# Patient Record
Sex: MALE | Race: BLACK OR AFRICAN AMERICAN | NOT HISPANIC OR LATINO | Employment: OTHER | URBAN - METROPOLITAN AREA
[De-identification: names, ages, dates, MRNs, and addresses within clinical notes are randomized per-mention and may not be internally consistent; named-entity substitution may affect disease eponyms.]

---

## 2017-05-15 ENCOUNTER — NURSE TRIAGE (OUTPATIENT)
Dept: ADMINISTRATIVE | Facility: CLINIC | Age: 77
End: 2017-05-15

## 2023-12-03 ENCOUNTER — HOSPITAL ENCOUNTER (INPATIENT)
Facility: HOSPITAL | Age: 83
LOS: 11 days | Discharge: NURSING, REHAB OR LONG-TERM CARE | DRG: 064 | End: 2023-12-15
Attending: EMERGENCY MEDICINE | Admitting: PSYCHIATRY & NEUROLOGY
Payer: MEDICARE

## 2023-12-03 DIAGNOSIS — I61.4 LEFT-SIDED NONTRAUMATIC INTRACEREBRAL HEMORRHAGE OF CEREBELLUM: ICD-10-CM

## 2023-12-03 DIAGNOSIS — M79.603 ARM PAIN: ICD-10-CM

## 2023-12-03 DIAGNOSIS — R41.0 DELIRIUM: Primary | ICD-10-CM

## 2023-12-03 DIAGNOSIS — I61.4 CEREBELLAR HEMORRHAGE, ACUTE: ICD-10-CM

## 2023-12-03 DIAGNOSIS — M25.539 WRIST PAIN: ICD-10-CM

## 2023-12-03 DIAGNOSIS — I61.9 ICH (INTRACEREBRAL HEMORRHAGE): ICD-10-CM

## 2023-12-03 DIAGNOSIS — F03.911 AGITATION DUE TO DEMENTIA: ICD-10-CM

## 2023-12-03 LAB
ALBUMIN SERPL BCP-MCNC: 3.4 G/DL (ref 3.5–5.2)
ALP SERPL-CCNC: 53 U/L (ref 55–135)
ALT SERPL W/O P-5'-P-CCNC: 13 U/L (ref 10–44)
AMMONIA PLAS-SCNC: 28 UMOL/L (ref 10–50)
ANION GAP SERPL CALC-SCNC: 14 MMOL/L (ref 8–16)
AST SERPL-CCNC: 39 U/L (ref 10–40)
BASOPHILS # BLD AUTO: 0.02 K/UL (ref 0–0.2)
BASOPHILS NFR BLD: 0.3 % (ref 0–1.9)
BILIRUB SERPL-MCNC: 0.9 MG/DL (ref 0.1–1)
BUN SERPL-MCNC: 16 MG/DL (ref 8–23)
CALCIUM SERPL-MCNC: 9.4 MG/DL (ref 8.7–10.5)
CHLORIDE SERPL-SCNC: 100 MMOL/L (ref 95–110)
CO2 SERPL-SCNC: 30 MMOL/L (ref 23–29)
CREAT SERPL-MCNC: 0.9 MG/DL (ref 0.5–1.4)
DIFFERENTIAL METHOD: ABNORMAL
EOSINOPHIL # BLD AUTO: 0 K/UL (ref 0–0.5)
EOSINOPHIL NFR BLD: 0.1 % (ref 0–8)
ERYTHROCYTE [DISTWIDTH] IN BLOOD BY AUTOMATED COUNT: 15.5 % (ref 11.5–14.5)
EST. GFR  (NO RACE VARIABLE): >60 ML/MIN/1.73 M^2
GLUCOSE SERPL-MCNC: 89 MG/DL (ref 70–110)
HCT VFR BLD AUTO: 35.8 % (ref 40–54)
HGB BLD-MCNC: 12 G/DL (ref 14–18)
IMM GRANULOCYTES # BLD AUTO: 0.02 K/UL (ref 0–0.04)
IMM GRANULOCYTES NFR BLD AUTO: 0.3 % (ref 0–0.5)
INFLUENZA A, MOLECULAR: NOT DETECTED
INFLUENZA B, MOLECULAR: NOT DETECTED
LACTATE SERPL-SCNC: 2 MMOL/L (ref 0.5–2.2)
LYMPHOCYTES # BLD AUTO: 1.2 K/UL (ref 1–4.8)
LYMPHOCYTES NFR BLD: 15.8 % (ref 18–48)
MCH RBC QN AUTO: 30.6 PG (ref 27–31)
MCHC RBC AUTO-ENTMCNC: 33.5 G/DL (ref 32–36)
MCV RBC AUTO: 91 FL (ref 82–98)
MONOCYTES # BLD AUTO: 0.4 K/UL (ref 0.3–1)
MONOCYTES NFR BLD: 5.9 % (ref 4–15)
NEUTROPHILS # BLD AUTO: 5.7 K/UL (ref 1.8–7.7)
NEUTROPHILS NFR BLD: 77.6 % (ref 38–73)
NRBC BLD-RTO: 0 /100 WBC
PLATELET # BLD AUTO: 199 K/UL (ref 150–450)
PMV BLD AUTO: 9.2 FL (ref 9.2–12.9)
POTASSIUM SERPL-SCNC: 3.1 MMOL/L (ref 3.5–5.1)
PROT SERPL-MCNC: 8.3 G/DL (ref 6–8.4)
RBC # BLD AUTO: 3.92 M/UL (ref 4.6–6.2)
RSV AG BY MOLECULAR METHOD: NOT DETECTED
SARS-COV-2 RNA RESP QL NAA+PROBE: NOT DETECTED
SODIUM SERPL-SCNC: 144 MMOL/L (ref 136–145)
WBC # BLD AUTO: 7.34 K/UL (ref 3.9–12.7)

## 2023-12-03 PROCEDURE — 96372 THER/PROPH/DIAG INJ SC/IM: CPT | Performed by: EMERGENCY MEDICINE

## 2023-12-03 PROCEDURE — 93010 ELECTROCARDIOGRAM REPORT: CPT | Mod: ,,, | Performed by: INTERNAL MEDICINE

## 2023-12-03 PROCEDURE — 63600175 PHARM REV CODE 636 W HCPCS: Performed by: EMERGENCY MEDICINE

## 2023-12-03 PROCEDURE — 87040 BLOOD CULTURE FOR BACTERIA: CPT | Performed by: EMERGENCY MEDICINE

## 2023-12-03 PROCEDURE — 83605 ASSAY OF LACTIC ACID: CPT | Performed by: EMERGENCY MEDICINE

## 2023-12-03 PROCEDURE — 87186 SC STD MICRODIL/AGAR DIL: CPT | Performed by: EMERGENCY MEDICINE

## 2023-12-03 PROCEDURE — 93005 ELECTROCARDIOGRAM TRACING: CPT

## 2023-12-03 PROCEDURE — 87077 CULTURE AEROBIC IDENTIFY: CPT | Performed by: EMERGENCY MEDICINE

## 2023-12-03 PROCEDURE — 87150 DNA/RNA AMPLIFIED PROBE: CPT | Performed by: EMERGENCY MEDICINE

## 2023-12-03 PROCEDURE — 99285 EMERGENCY DEPT VISIT HI MDM: CPT | Mod: 25

## 2023-12-03 PROCEDURE — 85025 COMPLETE CBC W/AUTO DIFF WBC: CPT | Performed by: EMERGENCY MEDICINE

## 2023-12-03 PROCEDURE — 82140 ASSAY OF AMMONIA: CPT | Performed by: EMERGENCY MEDICINE

## 2023-12-03 PROCEDURE — 80053 COMPREHEN METABOLIC PANEL: CPT | Performed by: EMERGENCY MEDICINE

## 2023-12-03 PROCEDURE — 0241U SARS-COV2 (COVID) WITH FLU/RSV BY PCR: CPT | Performed by: EMERGENCY MEDICINE

## 2023-12-03 PROCEDURE — 82962 GLUCOSE BLOOD TEST: CPT

## 2023-12-03 PROCEDURE — 93010 EKG 12-LEAD: ICD-10-PCS | Mod: ,,, | Performed by: INTERNAL MEDICINE

## 2023-12-03 RX ORDER — HALOPERIDOL 5 MG/ML
2.5 INJECTION INTRAMUSCULAR
Status: COMPLETED | OUTPATIENT
Start: 2023-12-03 | End: 2023-12-03

## 2023-12-03 RX ADMIN — HALOPERIDOL LACTATE 2.5 MG: 5 INJECTION, SOLUTION INTRAMUSCULAR at 09:12

## 2023-12-04 PROBLEM — B18.2 CHRONIC HEPATITIS C WITHOUT HEPATIC COMA: Status: ACTIVE | Noted: 2023-08-01

## 2023-12-04 PROBLEM — I10 ESSENTIAL (PRIMARY) HYPERTENSION: Status: ACTIVE | Noted: 2023-07-06

## 2023-12-04 PROBLEM — G93.5 BRAIN COMPRESSION: Status: ACTIVE | Noted: 2023-12-04

## 2023-12-04 PROBLEM — I61.4 LEFT-SIDED NONTRAUMATIC INTRACEREBRAL HEMORRHAGE OF CEREBELLUM: Status: ACTIVE | Noted: 2023-12-04

## 2023-12-04 PROBLEM — Z87.891 PERSONAL HISTORY OF NICOTINE DEPENDENCE: Status: ACTIVE | Noted: 2023-08-01

## 2023-12-04 PROBLEM — I61.5 IVH (INTRAVENTRICULAR HEMORRHAGE): Status: ACTIVE | Noted: 2023-12-04

## 2023-12-04 PROBLEM — F03.90 UNSPECIFIED DEMENTIA, UNSPECIFIED SEVERITY, WITHOUT BEHAVIORAL DISTURBANCE, PSYCHOTIC DISTURBANCE, MOOD DISTURBANCE, AND ANXIETY: Status: ACTIVE | Noted: 2023-07-06

## 2023-12-04 LAB
ABO + RH BLD: NORMAL
ABO + RH BLD: NORMAL
ALBUMIN SERPL BCP-MCNC: 3.2 G/DL (ref 3.5–5.2)
ALLENS TEST: ABNORMAL
ALP SERPL-CCNC: 49 U/L (ref 55–135)
ALT SERPL W/O P-5'-P-CCNC: 10 U/L (ref 10–44)
ANION GAP SERPL CALC-SCNC: 12 MMOL/L (ref 8–16)
APTT PPP: 25 SEC (ref 21–32)
APTT PPP: 25.1 SEC (ref 21–32)
AST SERPL-CCNC: 36 U/L (ref 10–40)
BACTERIA #/AREA URNS AUTO: NORMAL /HPF
BASOPHILS # BLD AUTO: 0.02 K/UL (ref 0–0.2)
BASOPHILS NFR BLD: 0.4 % (ref 0–1.9)
BILIRUB SERPL-MCNC: 0.8 MG/DL (ref 0.1–1)
BILIRUB UR QL STRIP: NEGATIVE
BLD GP AB SCN CELLS X3 SERPL QL: NORMAL
BLD GP AB SCN CELLS X3 SERPL QL: NORMAL
BUN SERPL-MCNC: 17 MG/DL (ref 8–23)
CALCIUM SERPL-MCNC: 9 MG/DL (ref 8.7–10.5)
CHLORIDE SERPL-SCNC: 101 MMOL/L (ref 95–110)
CHOLEST SERPL-MCNC: 175 MG/DL (ref 120–199)
CHOLEST/HDLC SERPL: 3 {RATIO} (ref 2–5)
CLARITY UR REFRACT.AUTO: CLEAR
CO2 SERPL-SCNC: 32 MMOL/L (ref 23–29)
COLOR UR AUTO: YELLOW
CREAT SERPL-MCNC: 0.8 MG/DL (ref 0.5–1.4)
DELSYS: ABNORMAL
DIFFERENTIAL METHOD: ABNORMAL
EOSINOPHIL # BLD AUTO: 0 K/UL (ref 0–0.5)
EOSINOPHIL NFR BLD: 0.4 % (ref 0–8)
ERYTHROCYTE [DISTWIDTH] IN BLOOD BY AUTOMATED COUNT: 15.9 % (ref 11.5–14.5)
EST. GFR  (NO RACE VARIABLE): >60 ML/MIN/1.73 M^2
ESTIMATED AVG GLUCOSE: 91 MG/DL (ref 68–131)
GLUCOSE SERPL-MCNC: 66 MG/DL (ref 70–110)
GLUCOSE SERPL-MCNC: 67 MG/DL (ref 70–110)
GLUCOSE UR QL STRIP: NEGATIVE
HBA1C MFR BLD: 4.8 % (ref 4–5.6)
HCO3 UR-SCNC: 34.2 MMOL/L (ref 24–28)
HCT VFR BLD AUTO: 31.4 % (ref 40–54)
HDLC SERPL-MCNC: 58 MG/DL (ref 40–75)
HDLC SERPL: 33.1 % (ref 20–50)
HGB BLD-MCNC: 10.9 G/DL (ref 14–18)
HGB UR QL STRIP: NEGATIVE
HYALINE CASTS UR QL AUTO: 0 /LPF
IMM GRANULOCYTES # BLD AUTO: 0.02 K/UL (ref 0–0.04)
IMM GRANULOCYTES NFR BLD AUTO: 0.4 % (ref 0–0.5)
INR PPP: 1.1 (ref 0.8–1.2)
INR PPP: 1.1 (ref 0.8–1.2)
KETONES UR QL STRIP: ABNORMAL
LDLC SERPL CALC-MCNC: 105.8 MG/DL (ref 63–159)
LEUKOCYTE ESTERASE UR QL STRIP: NEGATIVE
LYMPHOCYTES # BLD AUTO: 0.9 K/UL (ref 1–4.8)
LYMPHOCYTES NFR BLD: 16.5 % (ref 18–48)
MCH RBC QN AUTO: 30.4 PG (ref 27–31)
MCHC RBC AUTO-ENTMCNC: 34.7 G/DL (ref 32–36)
MCV RBC AUTO: 88 FL (ref 82–98)
MICROSCOPIC COMMENT: NORMAL
MODE: ABNORMAL
MONOCYTES # BLD AUTO: 0.3 K/UL (ref 0.3–1)
MONOCYTES NFR BLD: 5.1 % (ref 4–15)
NEUTROPHILS # BLD AUTO: 4.4 K/UL (ref 1.8–7.7)
NEUTROPHILS NFR BLD: 77.2 % (ref 38–73)
NITRITE UR QL STRIP: NEGATIVE
NONHDLC SERPL-MCNC: 117 MG/DL
NRBC BLD-RTO: 0 /100 WBC
PCO2 BLDA: 50 MMHG (ref 35–45)
PH SMN: 7.44 [PH] (ref 7.35–7.45)
PH UR STRIP: 7 [PH] (ref 5–8)
PLATELET # BLD AUTO: 149 K/UL (ref 150–450)
PMV BLD AUTO: 8.9 FL (ref 9.2–12.9)
PO2 BLDA: 72 MMHG (ref 80–100)
POC BE: 10 MMOL/L
POC SATURATED O2: 95 % (ref 95–100)
POC TCO2: 36 MMOL/L (ref 23–27)
POCT GLUCOSE: 113 MG/DL (ref 70–110)
POCT GLUCOSE: 66 MG/DL (ref 70–110)
POCT GLUCOSE: 89 MG/DL (ref 70–110)
POTASSIUM SERPL-SCNC: 2.4 MMOL/L (ref 3.5–5.1)
POTASSIUM SERPL-SCNC: 3.1 MMOL/L (ref 3.5–5.1)
PROT SERPL-MCNC: 7.4 G/DL (ref 6–8.4)
PROT UR QL STRIP: ABNORMAL
PROTHROMBIN TIME: 11.9 SEC (ref 9–12.5)
PROTHROMBIN TIME: 11.9 SEC (ref 9–12.5)
RBC # BLD AUTO: 3.58 M/UL (ref 4.6–6.2)
RBC #/AREA URNS AUTO: 1 /HPF (ref 0–4)
SAMPLE: ABNORMAL
SITE: ABNORMAL
SODIUM SERPL-SCNC: 145 MMOL/L (ref 136–145)
SP GR UR STRIP: 1.02 (ref 1–1.03)
SPECIMEN OUTDATE: NORMAL
SPECIMEN OUTDATE: NORMAL
SQUAMOUS #/AREA URNS AUTO: 0 /HPF
T4 FREE SERPL-MCNC: 0.95 NG/DL (ref 0.71–1.51)
TRIGL SERPL-MCNC: 56 MG/DL (ref 30–150)
TSH SERPL DL<=0.005 MIU/L-ACNC: 5.46 UIU/ML (ref 0.4–4)
URN SPEC COLLECT METH UR: ABNORMAL
WBC # BLD AUTO: 5.69 K/UL (ref 3.9–12.7)
WBC #/AREA URNS AUTO: 0 /HPF (ref 0–5)

## 2023-12-04 PROCEDURE — 97530 THERAPEUTIC ACTIVITIES: CPT

## 2023-12-04 PROCEDURE — 80061 LIPID PANEL: CPT | Performed by: NURSE PRACTITIONER

## 2023-12-04 PROCEDURE — 85730 THROMBOPLASTIN TIME PARTIAL: CPT | Mod: 91 | Performed by: NURSE PRACTITIONER

## 2023-12-04 PROCEDURE — 99499 NO LOS: ICD-10-PCS | Mod: ,,, | Performed by: NURSE PRACTITIONER

## 2023-12-04 PROCEDURE — 36600 WITHDRAWAL OF ARTERIAL BLOOD: CPT

## 2023-12-04 PROCEDURE — 99291 CRITICAL CARE FIRST HOUR: CPT | Mod: ,,, | Performed by: NURSE PRACTITIONER

## 2023-12-04 PROCEDURE — 99499 UNLISTED E&M SERVICE: CPT | Mod: GC,,, | Performed by: PSYCHIATRY & NEUROLOGY

## 2023-12-04 PROCEDURE — 99233 SBSQ HOSP IP/OBS HIGH 50: CPT | Mod: FS,,, | Performed by: PSYCHIATRY & NEUROLOGY

## 2023-12-04 PROCEDURE — 85730 THROMBOPLASTIN TIME PARTIAL: CPT | Performed by: EMERGENCY MEDICINE

## 2023-12-04 PROCEDURE — 25000003 PHARM REV CODE 250: Performed by: EMERGENCY MEDICINE

## 2023-12-04 PROCEDURE — 80053 COMPREHEN METABOLIC PANEL: CPT | Performed by: NURSE PRACTITIONER

## 2023-12-04 PROCEDURE — 63600175 PHARM REV CODE 636 W HCPCS: Performed by: NURSE PRACTITIONER

## 2023-12-04 PROCEDURE — 86850 RBC ANTIBODY SCREEN: CPT | Performed by: EMERGENCY MEDICINE

## 2023-12-04 PROCEDURE — 97112 NEUROMUSCULAR REEDUCATION: CPT

## 2023-12-04 PROCEDURE — 84439 ASSAY OF FREE THYROXINE: CPT | Performed by: NURSE PRACTITIONER

## 2023-12-04 PROCEDURE — 63600175 PHARM REV CODE 636 W HCPCS: Performed by: EMERGENCY MEDICINE

## 2023-12-04 PROCEDURE — 84443 ASSAY THYROID STIM HORMONE: CPT | Performed by: NURSE PRACTITIONER

## 2023-12-04 PROCEDURE — 99900035 HC TECH TIME PER 15 MIN (STAT)

## 2023-12-04 PROCEDURE — 99233 PR SUBSEQUENT HOSPITAL CARE,LEVL III: ICD-10-PCS | Mod: ,,, | Performed by: PSYCHIATRY & NEUROLOGY

## 2023-12-04 PROCEDURE — 85610 PROTHROMBIN TIME: CPT | Mod: 91 | Performed by: NURSE PRACTITIONER

## 2023-12-04 PROCEDURE — 63600175 PHARM REV CODE 636 W HCPCS: Performed by: PHYSICIAN ASSISTANT

## 2023-12-04 PROCEDURE — 20000000 HC ICU ROOM

## 2023-12-04 PROCEDURE — 86900 BLOOD TYPING SEROLOGIC ABO: CPT | Mod: 91 | Performed by: NURSE PRACTITIONER

## 2023-12-04 PROCEDURE — 99499 NO LOS: ICD-10-PCS | Mod: GC,,, | Performed by: PSYCHIATRY & NEUROLOGY

## 2023-12-04 PROCEDURE — 99233 SBSQ HOSP IP/OBS HIGH 50: CPT | Mod: ,,, | Performed by: PSYCHIATRY & NEUROLOGY

## 2023-12-04 PROCEDURE — 99291 PR CRITICAL CARE, E/M 30-74 MINUTES: ICD-10-PCS | Mod: ,,, | Performed by: NURSE PRACTITIONER

## 2023-12-04 PROCEDURE — 94761 N-INVAS EAR/PLS OXIMETRY MLT: CPT | Mod: XB

## 2023-12-04 PROCEDURE — 25000003 PHARM REV CODE 250: Performed by: NURSE PRACTITIONER

## 2023-12-04 PROCEDURE — 97162 PT EVAL MOD COMPLEX 30 MIN: CPT

## 2023-12-04 PROCEDURE — 99233 PR SUBSEQUENT HOSPITAL CARE,LEVL III: ICD-10-PCS | Mod: FS,,, | Performed by: PSYCHIATRY & NEUROLOGY

## 2023-12-04 PROCEDURE — 85610 PROTHROMBIN TIME: CPT | Performed by: EMERGENCY MEDICINE

## 2023-12-04 PROCEDURE — 85025 COMPLETE CBC W/AUTO DIFF WBC: CPT | Performed by: NURSE PRACTITIONER

## 2023-12-04 PROCEDURE — 82803 BLOOD GASES ANY COMBINATION: CPT

## 2023-12-04 PROCEDURE — 25000003 PHARM REV CODE 250: Performed by: PSYCHIATRY & NEUROLOGY

## 2023-12-04 PROCEDURE — 97166 OT EVAL MOD COMPLEX 45 MIN: CPT

## 2023-12-04 PROCEDURE — 36415 COLL VENOUS BLD VENIPUNCTURE: CPT | Performed by: PSYCHIATRY & NEUROLOGY

## 2023-12-04 PROCEDURE — 81001 URINALYSIS AUTO W/SCOPE: CPT | Performed by: EMERGENCY MEDICINE

## 2023-12-04 PROCEDURE — 84132 ASSAY OF SERUM POTASSIUM: CPT | Performed by: NURSE PRACTITIONER

## 2023-12-04 PROCEDURE — 25000003 PHARM REV CODE 250: Performed by: PHYSICIAN ASSISTANT

## 2023-12-04 PROCEDURE — 83036 HEMOGLOBIN GLYCOSYLATED A1C: CPT | Performed by: NURSE PRACTITIONER

## 2023-12-04 PROCEDURE — 99499 UNLISTED E&M SERVICE: CPT | Mod: ,,, | Performed by: NURSE PRACTITIONER

## 2023-12-04 RX ORDER — LORAZEPAM 2 MG/ML
1 INJECTION INTRAMUSCULAR
Status: COMPLETED | OUTPATIENT
Start: 2023-12-04 | End: 2023-12-04

## 2023-12-04 RX ORDER — POTASSIUM CHLORIDE 7.45 MG/ML
80 INJECTION INTRAVENOUS
Status: DISCONTINUED | OUTPATIENT
Start: 2023-12-04 | End: 2023-12-06

## 2023-12-04 RX ORDER — ONDANSETRON 2 MG/ML
4 INJECTION INTRAMUSCULAR; INTRAVENOUS EVERY 8 HOURS PRN
Status: DISCONTINUED | OUTPATIENT
Start: 2023-12-04 | End: 2023-12-15 | Stop reason: HOSPADM

## 2023-12-04 RX ORDER — MAGNESIUM SULFATE HEPTAHYDRATE 40 MG/ML
2 INJECTION, SOLUTION INTRAVENOUS
Status: DISCONTINUED | OUTPATIENT
Start: 2023-12-04 | End: 2023-12-06

## 2023-12-04 RX ORDER — SUCCINYLCHOLINE CHLORIDE 20 MG/ML
100 INJECTION INTRAMUSCULAR; INTRAVENOUS
Status: DISCONTINUED | OUTPATIENT
Start: 2023-12-04 | End: 2023-12-04

## 2023-12-04 RX ORDER — MUPIROCIN 20 MG/G
OINTMENT TOPICAL 2 TIMES DAILY
Status: COMPLETED | OUTPATIENT
Start: 2023-12-04 | End: 2023-12-08

## 2023-12-04 RX ORDER — SODIUM CHLORIDE 0.9 % (FLUSH) 0.9 %
10 SYRINGE (ML) INJECTION
Status: DISCONTINUED | OUTPATIENT
Start: 2023-12-04 | End: 2023-12-05

## 2023-12-04 RX ORDER — ETOMIDATE 2 MG/ML
20 INJECTION INTRAVENOUS
Status: DISCONTINUED | OUTPATIENT
Start: 2023-12-04 | End: 2023-12-04

## 2023-12-04 RX ORDER — POTASSIUM CHLORIDE 7.45 MG/ML
40 INJECTION INTRAVENOUS
Status: DISCONTINUED | OUTPATIENT
Start: 2023-12-04 | End: 2023-12-06

## 2023-12-04 RX ORDER — POTASSIUM CHLORIDE 7.45 MG/ML
10 INJECTION INTRAVENOUS
Status: COMPLETED | OUTPATIENT
Start: 2023-12-04 | End: 2023-12-04

## 2023-12-04 RX ORDER — MAGNESIUM SULFATE HEPTAHYDRATE 40 MG/ML
4 INJECTION, SOLUTION INTRAVENOUS
Status: DISCONTINUED | OUTPATIENT
Start: 2023-12-04 | End: 2023-12-06

## 2023-12-04 RX ORDER — HYDRALAZINE HYDROCHLORIDE 25 MG/1
25 TABLET, FILM COATED ORAL EVERY 8 HOURS
Status: DISCONTINUED | OUTPATIENT
Start: 2023-12-04 | End: 2023-12-07

## 2023-12-04 RX ORDER — LABETALOL HCL 20 MG/4 ML
10 SYRINGE (ML) INTRAVENOUS EVERY 4 HOURS PRN
Status: DISCONTINUED | OUTPATIENT
Start: 2023-12-04 | End: 2023-12-06

## 2023-12-04 RX ORDER — MEMANTINE HYDROCHLORIDE 5 MG/1
5 TABLET ORAL 2 TIMES DAILY
Status: DISCONTINUED | OUTPATIENT
Start: 2023-12-04 | End: 2023-12-15 | Stop reason: HOSPADM

## 2023-12-04 RX ORDER — NICARDIPINE HYDROCHLORIDE 0.2 MG/ML
0-15 INJECTION INTRAVENOUS CONTINUOUS
Status: DISCONTINUED | OUTPATIENT
Start: 2023-12-04 | End: 2023-12-06

## 2023-12-04 RX ORDER — POTASSIUM CHLORIDE 7.45 MG/ML
60 INJECTION INTRAVENOUS
Status: DISCONTINUED | OUTPATIENT
Start: 2023-12-04 | End: 2023-12-06

## 2023-12-04 RX ADMIN — HYDRALAZINE HYDROCHLORIDE 25 MG: 25 TABLET, FILM COATED ORAL at 09:12

## 2023-12-04 RX ADMIN — NICARDIPINE HYDROCHLORIDE 15 MG/HR: 0.2 INJECTION, SOLUTION INTRAVENOUS at 03:12

## 2023-12-04 RX ADMIN — DEXTROSE MONOHYDRATE 150 ML: 100 INJECTION, SOLUTION INTRAVENOUS at 04:12

## 2023-12-04 RX ADMIN — NICARDIPINE HYDROCHLORIDE 2.5 MG/HR: 0.2 INJECTION, SOLUTION INTRAVENOUS at 09:12

## 2023-12-04 RX ADMIN — LABETALOL HYDROCHLORIDE 10 MG: 5 INJECTION, SOLUTION INTRAVENOUS at 08:12

## 2023-12-04 RX ADMIN — POTASSIUM CHLORIDE 10 MEQ: 7.46 INJECTION, SOLUTION INTRAVENOUS at 09:12

## 2023-12-04 RX ADMIN — POTASSIUM CHLORIDE 10 MEQ: 7.46 INJECTION, SOLUTION INTRAVENOUS at 07:12

## 2023-12-04 RX ADMIN — POTASSIUM CHLORIDE 10 MEQ: 7.46 INJECTION, SOLUTION INTRAVENOUS at 05:12

## 2023-12-04 RX ADMIN — MUPIROCIN: 20 OINTMENT TOPICAL at 09:12

## 2023-12-04 RX ADMIN — SODIUM CHLORIDE 500 ML: 9 INJECTION, SOLUTION INTRAVENOUS at 12:12

## 2023-12-04 RX ADMIN — POTASSIUM CHLORIDE 10 MEQ: 7.46 INJECTION, SOLUTION INTRAVENOUS at 10:12

## 2023-12-04 RX ADMIN — MEMANTINE HYDROCHLORIDE 5 MG: 5 TABLET ORAL at 12:12

## 2023-12-04 RX ADMIN — POTASSIUM CHLORIDE 60 MEQ: 7.46 INJECTION, SOLUTION INTRAVENOUS at 03:12

## 2023-12-04 RX ADMIN — MEMANTINE HYDROCHLORIDE 5 MG: 5 TABLET ORAL at 09:12

## 2023-12-04 RX ADMIN — LORAZEPAM 1 MG: 2 INJECTION INTRAMUSCULAR; INTRAVENOUS at 01:12

## 2023-12-04 NOTE — PLAN OF CARE
[facility] - Neuro Critical Care  Initial Discharge Assessment       Primary Care Provider: Rupal Garcia MD    Admission Diagnosis: Arm pain [M79.603]  Delirium [R41.0]  Wrist pain [M25.539]  Cerebellar hemorrhage, acute [I61.4]  Agitation due to dementia [F03.911]    Admission Date: 12/3/2023  Expected Discharge Date:     Transition of Care Barriers: None    Payor: HU[employer] MANAGED MEDICARE / Plan: HU[employer]NA MEDICARE HMO / Product Type: Capitation /     Extended Emergency Contact Information  Primary Emergency Contact: Param Patricio   [place]  Mobile Phone: [phone]  Relation: Son    Discharge Plan A: Skilled Nursing Facility         [facility] - [place] - [address]  [address] [place] [address]  Phone: [phone] Fax: [phone]    [facility] [address]  [address] [facility] [address]  Phone: [phone] Fax: [phone]    [facility] [place], [place] - [address] [facility]  [address] [facility] [address]  Phone: [phone] Fax: [phone]      Initial Assessment (most recent)       Adult Discharge Assessment - 12/04/23 1549          Discharge Assessment    Assessment Type Discharge Planning Assessment     Confirmed/corrected address, phone number and insurance Yes     Confirmed Demographics Correct on Facesheet     Source of Information family     Reason For Admission confused     People in Home child(carey), adult   sons' girlfriend    Facility Arrived From: Home     Do you expect to return to your current living situation? Yes     Do you have help at home or someone to help you manage your care at home? Yes     Prior to hospitilization cognitive status: Alert/Oriented     Current cognitive status: Unable to Assess     Do you have any problems with: Needs other help     Specify other help Sons     Home Accessibility wheelchair accessible     Home Layout Able to  live on 1st floor     Equipment Currently Used at Home rollator     Readmission within 30 days? No     Patient currently being followed by outpatient case management? No     Do you currently have service(s) that help you manage your care at home? No     Do you take prescription medications? No     Do you have prescription coverage? Yes     Do you have any problems affording any of your prescribed medications? No     Is the patient taking medications as prescribed? no     Are you on dialysis? No     Do you take coumadin? No     DME Needed Upon Discharge  other (see comments)   TBD    Discharge Plan discussed with: Adult children     Transition of Care Barriers None     Discharge Plan A Skilled Nursing Facility        Physical Activity    On average, how many days per week do you engage in moderate to strenuous exercise (like a brisk walk)? 0 days     On average, how many minutes do you engage in exercise at this level? 0 min        Financial Resource Strain    How hard is it for you to pay for the very basics like food, housing, medical care, and heating? Not hard at all        Housing Stability    In the last 12 months, was there a time when you were not able to pay the mortgage or rent on time? No     In the last 12 months, how many places have you lived? 1     In the last 12 months, was there a time when you did not have a steady place to sleep or slept in a shelter (including now)? No        Transportation Needs    In the past 12 months, has lack of transportation kept you from medical appointments or from getting medications? No     In the past 12 months, has lack of transportation kept you from meetings, work, or from getting things needed for daily living? No        Food Insecurity    Within the past 12 months, you worried that your food would run out before you got the money to buy more. Never true     Within the past 12 months, the food you bought just didn't last and you didn't have money to get more. Never  true        Social Connections    In a typical week, how many times do you talk on the phone with family, friends, or neighbors? Never     How often do you get together with friends or relatives? Never     How often do you attend Presybeterian or Mandaen services? Never     Do you belong to any clubs or organizations such as Presyb[employer]ian groups, unions, fraternal or athletic groups, or school groups? No     How often do you attend meetings of the clubs or organizations you belong to? Never     Are you , , , , never , or living with a partner?         Alcohol Use    Q1: How often do you have a drink containing alcohol? Never     Q2: How many drinks containing alcohol do you have on a typical day when you are drinking? Patient does not drink     Q3: How often do you have six or more drinks on one occasion? Never        OTHER    Name(s) of People in Home (Brother) Venkatesh Patricio, (son's girlfriend) Donya Reyes                      Discharge Plan A and Plan B have been determined by review of patient's clinical status, future medical and therapeutic needs, and coverage/benefits for post-acute care in coordination with multidisciplinary team members.

## 2023-12-04 NOTE — ASSESSMENT & PLAN NOTE
- NSGY and Vn following   - SBP <160  - CTH: Left cerebellar intraparenchymal hemorrhage with intraventricular extension into the 4th ventricle   - repeat in 6 hrs   - Anticoagulation status: none  - Q 1 vitals   - Q 1 neuro checks   - TSH, A1c, lipid, echo and EKG  -  Pt/OT/SLP eval and treat  - NPO

## 2023-12-04 NOTE — ED NOTES
Pt still pulling at restraints, agitated and unable to be brought to CT at this time. Provider aware.

## 2023-12-04 NOTE — H&P
[facility] - Emergency Dept  Neurocritical Care  History & Physical    Admit Date: 12/3/2023  Service Date: 12/04/2023  Length of Stay: 0    Subjective:     Chief Complaint: 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    History of Present Illness: Mr. Virgen is an 83-year-old male with history of dementia who presents to [facility] for Cerebellar ICH with IVH. He presented to ED with family for increased confusion and decreased mental status. Per family  He has become quite confused and out of it over the last 2 days.  They reported he fell out of his chair yesterday and  He has been confused.   C Th in ED revealed Cerebellar ich with ivh into 4th ventricle. He is being admitted to [facility] for a higher level of care. History from chart due to LOC       Past Medical History:   Diagnosis Date    AR (allergic rhinitis)     Hypertension     hereditary    Memory loss     Prostate cancer      History reviewed. No pertinent surgical history.   No current facility-administered medications on file prior to encounter.     Current Outpatient Medications on File Prior to Encounter   Medication Sig Dispense Refill    donepezil (ARICEPT) 5 MG tablet Take 1 tablet (5 mg total) by mouth once daily. 30 tablet 11    memantine (NAMENDA) 10 MG Tab Take 5 mg by mouth 2 (two) times daily.      sildenafil (VIAGRA) 100 MG tablet Take 1 tablet (100 mg total) by mouth daily as needed for Erectile Dysfunction. 4 tablet 6    tadalafil (CIALIS) 20 MG Tab Take 1 tablet (20 mg total) by mouth daily as needed. Take 1 hour before intercourse 10 tablet 11    vardenafil (LEVITRA) 20 MG tablet Take 1 tablet (20 mg total) by mouth daily as needed for Erectile Dysfunction. Take 1 hour before intercourse. 10 tablet 11      Allergies: Strawberries [strawberry]    Social History     Tobacco Use    Smoking status: Every Day     Current packs/day: 0.25     Types: Cigarettes    Smokeless tobacco: Never   Substance Use Topics    Alcohol use: No    Drug  use: No     Review of Systems    Unable to perform due to LOC     Objective:     Vitals:    Temp: 96.4 °F (35.8 °C)  Pulse: 90  BP: (!) 159/102  MAP (mmHg): 126  Resp: 12  SpO2: 100 %    Temp  Min: 96.4 °F (35.8 °C)  Max: 97.4 °F (36.3 °C)  Pulse  Min: 80  Max: 90  BP  Min: 159/102  Max: 244/163  MAP (mmHg)  Min: 119  Max: 194  Resp  Min: 12  Max: 16  SpO2  Min: 99 %  Max: 100 %    No intake/output data recorded.            Physical Exam      Physical Exam:  GA: Lethargic , no acute distress.   HEENT: No scleral icterus or JVD.   Pulmonary: Clear to auscultation A//L.   Cardiac: RRR S1 & S2 w/o rubs/murmurs/gallops.   Abdominal: Bowel sounds present x 4.   Skin: No jaundice, rashes, or visible lesions.  Neuro:  --GCS: E2 V3 M5  --Mental Status:  lethargic does not follow or attend   --CN II-XII grossly unable to assess fully due to LOC   --Pupils 3mm, PERRL.   --Corneal reflex, gag, cough intact.  -- Localizes upper withdrawal lowers     Unable to test orientation, language, memory, judgment, insight, fund of knowledge, gait due to level of consciousness.       Today I personally reviewed pertinent medications, lines/drains/airways, imaging, laboratory results, notably: CTH    Assessment/Plan:     Neuro  * 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  - NSGY and Vn following   - SBP <160  - CTH: Left cerebellar intraparenchymal hemorrhage with intraventricular extension into the 4th ventricle   - repeat in 6 hrs   - Anticoagulation status: none  - Q 1 vitals   - Q 1 neuro checks   - TSH, A1c, lipid, echo and EKG  -  Pt/OT/SLP eval and treat  - NPO        Brain compression  - see ich     IVH (intraventricular hemorrhage)  - see ich       Cardiac/Vascular  Hypertension  - SBP <160   - Echo and EKG   - NIcardipine gtt  - Labetalol and hydralazine prn           The patient is being Prophylaxed for:  Venous Thromboembolism with: Mechanical  Stress Ulcer with: Not Applicable   Ventilator Pneumonia with:  not applicable    Activity Orders            Turn patient starting at 12/04 0400    Elevate HOB starting at 12/04 0311    Diet NPO: NPO starting at 12/04 0311          Full Code      Critical condition in that Patient has a condition that poses threat to life and bodily function: Cerebellar ICH, brain compression,  IVH, HTN,      54minutes of Critical care time was spent personally by me on the following activities: development of treatment plan with patient or surrogate and bedside caregivers, discussions with consultants, evaluation of patient's response to treatment, examination of patient, ordering and performing treatments and interventions, ordering and review of laboratory studies, ordering and review of radiographic studies, pulse oximetry, antibiotic titration if applicable, vasopressor titration if applicable, re-evaluation of patient's condition. This critical care time did not overlap with that of any other provider or involve time for any procedures. There is high probability for acute neurological change leading to clinical and possibly life-threatening deterioration requiring highest level of physician preparedness for urgent intervention.      Job Odonnell NP  Neurocritical Care  [facility]

## 2023-12-04 NOTE — PLAN OF CARE
"[facility] Care Plan    POC reviewed with Sheng Virgen and family at 0300. Pt verbalized understanding. Questions and concerns addressed. No acute events overnight. Pt progressing toward goals. Will continue to monitor. See below and flowsheets for full assessment and VS info.     - transfer from [facility] ED at 0435  - Potassium replacement initiated for potassium level iof 2.4  - Cardene titrated to off.        Is this a stroke patient? yes- Stroke booklet reviewed with patient and family, risk factors identified for patient and stroke booklet remains at bedside for ongoing education.     Neuro:  Be[place]umont Coma Scale  Best Eye Response: 4-->(E4) spontaneous  Best Motor Response: 6-->(M6) obeys commands  Best Verbal Response: 4-->(V4) confused  Hiral Coma Scale Score: 14  Assessment Qualifiers: patient not sedated/intubated, no eye obstruction present  Pupil PERRLA: yes     24hr Temp:  [96.4 °F (35.8 °C)-97.4 °F (36.3 °C)]     CV:   Rhythm: normal sinus rhythm  BP goals:   SBP < 160  MAP > 65    Resp:           Plan: N/A    GI/:     Diet/Nutrition Received: NPO          Intake/Output Summary (Last 24 hours) at 12/4/2023 0634  Last data filed at 12/4/2023 0601  Gross per 24 hour   Intake 365.12 ml   Output --   Net 365.12 ml          Labs/Accuchecks:  Recent Labs   Lab 12/04/23  0359   WBC 5.69   RBC 3.58*   HGB 10.9*   HCT 31.4*   *      Recent Labs   Lab 12/04/23  0359      K 2.4*   CO2 32*      BUN 17   CREATININE 0.8   ALKPHOS 49*   ALT 10   AST 36   BILITOT 0.8      Recent Labs   Lab 12/04/23  0347 12/04/23  0359   INR 1.1 1.1   APTT 25.0  --     No results for input(s): "CPK", "CPKMB", "TROPONINI", "MB" in the last 168 hours.    Electrolytes: Electrolytes replaced  Accuchecks: none    Gtts:   nicardipine Stopped (12/04/23 0531)       LDA/Wounds:  Lines/Drains/Airways       Peripheral Intravenous Line  Duration                  Peripheral IV - Single Lumen 12/03/23 2206 20 G Right Antecubital <1 day     "     Peripheral IV - Single Lumen 12/04/23 0322 18 G Anterior;Left Upper Arm <1 day                  Wounds: No  Wound care consulted: No

## 2023-12-04 NOTE — ASSESSMENT & PLAN NOTE
-Due to stroke and noted on imaging.  4th ventricle compression specifically noted.    -The patient is admitted to [facility] for close monitoring, hourly neuro checks.  -Neurosurgery consulted by primary team  -Likely etiology is hypertension

## 2023-12-04 NOTE — NURSING
Pt has order for hydralazine 25 mg q8. Pt blood pressure 104/69 at time med is due; spoke to JAMEY Torres PA-C, and was instructed to hold medication. WAN.

## 2023-12-04 NOTE — PT/OT/SLP EVAL
Occupational Therapy   Evaluation    Name: Sheng Virgen  MRN: [id number]  Admitting Diagnosis: 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  Recent Surgery: * No surgery found *      Recommendations:     Discharge Recommendations: Moderate Intensity Therapy  Discharge Equipment Recommendations:  hospital bed, bedside commode, lift device, wheelchair  Barriers to discharge:  Decreased caregiver support, Other (Comment) (Increased A needed)    Assessment:     Sheng Virgen is a 83 y.o. 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  He presents with decrease functional status secondary to medical diagnosis. Performance deficits affecting function: weakness, impaired endurance, impaired self care skills, impaired functional mobility, gait instability, impaired balance, impaired cognition, visual deficits, impaired coordination, decreased upper extremity function, decreased lower extremity function, decreased safety awareness. Patient with poor tolerance to therapy this date unable to complete OOB mobility due to current physical status.  Patient noted with a decline in cognitive status, visual perception, and ability to follow commands. Patient physical status unable to be formally assessed due to poor command following. Patient is therefore appropriate for acute OT services to increase patient self care performance and functional mobility. Following DC from [facility] patient should continue with a moderate intensity therapy to ensure patient safety and promote return to independence.       Rehab Prognosis: Fair; patient would benefit from acute skilled OT services to address these deficits and reach maximum level of function.       Plan:     Patient to be seen 3 x/week to address the above listed problems via self-care/home management, therapeutic activities, therapeutic exercises, neuromuscular re-education  Plan of Care Expires: 01/04/24  Plan of Care Reviewed  with: patient    Subjective     Chief Complaint: Unable to verbalize   Patient/Family Comments/goals: DC     Occupational Profile:  Living Environment: Patient lives with son in [facility] with 0 COURTNEY. Patient has access to walk in shower and tub shower combo.   Previous level of function: Patient requiring increased assist with all ADLs. Patient ambulated with RW.   Roles and Routines: Unknown   Equipment Used at Home:  RW   Assistance upon Discharge: Patient son stating he is unable to continue to care for patient    Pain/Comfort:  Pain Rating 1: 0/10    Patients cultural, spiritual, Mosque conflicts given the current situation: no    Objective:     Communicated with: RN prior to session.  Patient found supine with bed alarm, blood pressure cuff, pulse ox (continuous), Condom Catheter, SCD upon OT entry to room.    General Precautions: Standard, fall  Orthopedic Precautions: N/A  Braces: N/A  Respiratory Status: Room air    Occupational Performance:    Bed Mobility:    Patient completed Rolling/Turning to Left with  total assistance and 2 persons  Patient completed Scooting/Bridging with total assistance and 2 persons  Patient completed Supine to Sit with total assistance and 2 persons  Patient completed Sit to Supine with total assistance and 2 persons  Static/dynamic sitting EOB with min-max assist due to posterior lean    Functional Mobility/Transfers:  Deferred due to patient current physical status     Activities of Daily Living:  Grooming: dependence    Upper Body Dressing: dependence    Lower Body Dressing: dependence      Cognitive/Visual Perceptual:  Cognitive/Psychosocial Skills: Patient unable to communicate via verbalizations or gestures this date. Patient following 25% of commands at this time.   Visual/Perceptual: Patient with absent tracking and low stimuli response at this time. Patient able to identify 1 color during testing and zero numbers.     Physical Exam:  Movement noted in all extremities;  unable to be formally assessed due to cognitive deficits at this time. Based on observation patient with likely functional UE ROM and generalized weakness.     AMPAC 6 Click ADL:  AMPAC Total Score: 6    Treatment & Education:  Co-evaluation completed due to patient medical instability and to ensure patient safety. Provided education regarding role of OT, POC, & discharge recommendations.  Pt with no further questions/concerns at this time. OT provided education on home recommendations and fall prevention in preparation for D/C.     Patient left supine with all lines intact, call button in reach, and bed alarm on    GOALS:   Multidisciplinary Problems       Occupational Therapy Goals          Problem: Occupational Therapy    Goal Priority Disciplines Outcome Interventions   Occupational Therapy Goal     OT, PT/OT Ongoing, Progressing    Description: Goals to be met by: 1/4/23     Patient will increase functional independence with ADLs by performing:    UE Dressing with Moderate Assistance.  LE Dressing with Moderate Assistance.  Grooming while seated with Moderate Assistance.  Toileting from bedside commode with Moderate Assistance for hygiene and clothing management.   Stand pivot transfers with Moderate Assistance.  Toilet transfer to bedside commode with Moderate Assistance.                         History:     Past Medical History:   Diagnosis Date    AR (allergic rhinitis)     Chronic hepatitis C without hepatic coma 8/1/2023    Hypertension     hereditary    Memory loss     Prostate cancer        History reviewed. No pertinent surgical history.    Time Tracking:     OT Date of Treatment: 12/04/23  OT Start Time: 1143  OT Stop Time: 1204  OT Total Time (min): 21 min    Billable Minutes:Evaluation 10 minutes   Therapeutic Activity 11 minutes     12/4/2023

## 2023-12-04 NOTE — NURSING
Patient arrived to [facility] from ED Bed 2 by stretcher    Type of stroke/diagnosis:  Cerebellar ICH with IVH    Current symptoms: Pt arouses to voice, Pt oriented to self otherwise incomprehensible speech intermittently moaning and groaning, spontaneous movement x4 not following commands    Skin Assessment done: Y  Wounds noted: L forearm skin tear   *If wounds noted, was Wound Care consulted? Y   *If wounds noted, LDA placed? Y  Skin Assessment Verified by:  Erika VALADEZ & Antoni Escoto Completed? Pending     Patient Belongings on Admit: (be specific) Black pants, Tan collared shirt, blue shirt, black socks, brown belt    NCC notified: MARIELY Odonnell

## 2023-12-04 NOTE — ED PROVIDER NOTES
Encounter Date: 12/3/2023       History     Chief Complaint   Patient presents with    Fatigue     Generalized weakness getting progressively worse, strong urine odor, incontinent of urine     Mr. Virgen is an 83-year-old male with history of dementia.  He has become quite confused and out of it over the last 2 days.  He fell out of his chair yesterday.  He has been confused.  There has been no fever or vomiting.  Family has never seen him like this.      Review of patient's allergies indicates:   Allergen Reactions    Strawberries [strawberry] Hives, Itching and Rash     Strawberries make patient itch and brake  out in hives with a rash and makes lips swell up.      Past Medical History:   Diagnosis Date    AR (allergic rhinitis)     Hypertension     hereditary    Memory loss     Prostate cancer      History reviewed. No pertinent surgical history.  Family History   Problem Relation Age of Onset    Hypertension Mother     Colon cancer Father     Colon cancer Sister      Social History     Tobacco Use    Smoking status: Every Day     Current packs/day: 0.25     Types: Cigarettes    Smokeless tobacco: Never   Substance Use Topics    Alcohol use: No    Drug use: No     Review of Systems    Physical Exam     Initial Vitals [12/03/23 1844]   BP Pulse Resp Temp SpO2   (!) 167/95 87 16 97.4 °F (36.3 °C) 99 %      MAP       --         Physical Exam    Constitutional:   Patient appears agitated and delirious   HENT:   Head: Normocephalic.   Eyes: Pupils are equal, round, and reactive to light.   Neck: Neck supple. No JVD present.   Normal range of motion.  Cardiovascular:  Normal rate, regular rhythm and normal heart sounds.           Pulmonary/Chest: Breath sounds normal. No respiratory distress. He has no wheezes. He has no rales.   Abdominal: Abdomen is soft. Bowel sounds are normal. He exhibits no distension. There is no abdominal tenderness. There is no rebound.   Musculoskeletal:         General: No edema.       Cervical back: Normal range of motion and neck supple.      Comments: Skin tear to his left forearm.  Moves all extremities with no pain on range of motion.  Pointing to his left wrist.  No obvious deformities     Neurological: He is alert. He has normal strength.   Patient is agitated and delirious         ED Course   Procedures  Labs Reviewed   CBC W/ AUTO DIFFERENTIAL - Abnormal; Notable for the following components:       Result Value    RBC 3.92 (*)     Hemoglobin 12.0 (*)     Hematocrit 35.8 (*)     RDW 15.5 (*)     Gran % 77.6 (*)     Lymph % 15.8 (*)     All other components within normal limits   COMPREHENSIVE METABOLIC PANEL - Abnormal; Notable for the following components:    Potassium 3.1 (*)     CO2 30 (*)     Albumin 3.4 (*)     Alkaline Phosphatase 53 (*)     All other components within normal limits   CULTURE, BLOOD   CULTURE, BLOOD   LACTIC ACID, PLASMA   AMMONIA   URINALYSIS, REFLEX TO URINE CULTURE   SARS-COV2 (COVID) WITH FLU/RSV BY PCR   POCT GLUCOSE MONITORING CONTINUOUS     EKG Readings: (Independently Interpreted)   Sinus tachycardia at 106 without ischemic changes       Imaging Results              X-Ray Chest AP Portable (Final result)  Result time 12/03/23 22:12:46      Final result by John Valenzuela DO (12/03/23 22:12:46)                   Impression:      No acute abnormality.      Electronically signed by: John Valenzuela  Date:    12/03/2023  Time:    22:12               Narrative:    EXAMINATION:  XR CHEST AP PORTABLE    CLINICAL HISTORY:  altered;    TECHNIQUE:  Single frontal view of the chest was performed.    COMPARISON:  11/06/2003.    FINDINGS:  The lungs are well expanded and clear. No focal opacities are seen. The pleural spaces are clear. The cardiac silhouette is unremarkable.  There are calcifications of the aortic arch.  The visualized osseous structures are unremarkable.                                       X-Ray Wrist Complete Left (Final result)  Result time 12/03/23  22:18:49      Final result by Sim Feliz MD (12/03/23 22:18:49)                   Impression:      No acute radiographic abnormality.      Electronically signed by: Sim Feliz  Date:    12/03/2023  Time:    22:18               Narrative:    EXAMINATION:  XR WRIST COMPLETE 3 VIEWS LEFT    CLINICAL HISTORY:  Pain in unspecified wrist    TECHNIQUE:  PA, lateral, and oblique views of the left wrist were performed.    COMPARISON:  None    FINDINGS:  Alignment is satisfactory.  No acute fracture, subluxation or dislocation.  Mild joint space narrowing of the radiocarpal joint.                                       X-Ray Forearm Left (Final result)  Result time 12/03/23 22:28:22      Final result by Sim Feliz MD (12/03/23 22:28:22)                   Impression:      No acute radiographic abnormality.      Electronically signed by: Sim Feliz  Date:    12/03/2023  Time:    22:28               Narrative:    EXAMINATION:  XR FOREARM LEFT    CLINICAL HISTORY:  Pain in arm, unspecified    TECHNIQUE:  AP and lateral views of the left forearm were performed.  Three images.    COMPARISON:  None    FINDINGS:  No acute fracture, subluxation or dislocation.  No mass or foreign body.  No localizing symptoms.                                       Medications   haloperidol lactate injection 2.5 mg (2.5 mg Intramuscular Given 12/3/23 2102)   haloperidol lactate injection 2.5 mg (2.5 mg Intramuscular Given 12/3/23 2146)     Medical Decision Making  Patient with dementia presents with delirium.  We attempted to do evaluation but required sedation.  Initially gave him Haldol 2.5 mg IM.  Became very violent and thrashing about.  He was spitting at nurses and trying to punch people.  He required soft wrist restraints.  We tried alternative measures of calming him down and medications without success.  We did this for his safety.  Re-dosed his Haldol and he seemed to be more calm but still somewhat agitated.    Patient is  delirious.  Will consider many etiologies including subdural hematoma or other neurologic emergencies, electrolyte abnormalities such as hyponatremia, urinary tract infection.   Will obtain head CT, chest x-ray and infectious labs.  Will also x-ray his left forearm and wrist since that seems to be a site of trauma.  Will also do a CT of his cervical spine.    I evaluated many times.  Case signed out to Dr. Mg at 10:30 p.m. with evaluation pending.  Anticipate admission to Hospital Medicine unless testing finds different etiology.    Amount and/or Complexity of Data Reviewed  Labs: ordered.  Radiology: ordered.    Risk  Prescription drug management.              Attending Attestation:         Attending Critical Care:   Critical Care Times:   Direct Patient Care (initial evaluation, reassessments, and time considering the case)................................................................24 minutes.   Additional History from reviewing old medical records or taking additional history from the family, EMS, PCP, etc.......................4 minutes.   Ordering, Reviewing, and Interpreting Diagnostic Studies...............................................................................................................4 minutes.   Documentation..................................................................................................................................................................................4 minutes.   Consultation with other Physicians. .................................................................................................................................................4 minutes.   ==============================================================  Total Critical Care Time - exclusive of procedural time: 40 minutes.  ==============================================================                                 Clinical Impression:  Final diagnoses:  [F03.911] Agitation due to  dementia  [M25.539] Wrist pain  [M79.603] Arm pain  [R41.0] Delirium (Primary)                 Malcom Francisco MD  12/03/23 2231       Malcom Francisco MD  12/03/23 0784

## 2023-12-04 NOTE — ASSESSMENT & PLAN NOTE
83M h/o dementia presents with increased confusion and decreased mental status. Per family  He has become quite confused and out of it over the last 2 days.  They reported he fell out of his chair yesterday and  He has been confused.   C Th in ED revealed Cerebellar ich with ivh into 4th ventricle. He is being admitted to [facility] for a higher level of care. No AC/AP.    ICHS 2    --Patient admitted to ICU on telemetry      -q1h neurochecks in ICU, q2h neurochecks in stepdown, q4h neurochecks on floor  --All labs and diagnostics reviewed  --Follow-up CTH and 6h scan for stability      -CTA tomorrow AM  --Hold anti-plt/coag medications  --SBP <140 (cardene ggt; hydralazine & labetalol PRN; transition to home meds when appropriate)  --Na >135  --HOB >30  --Follow-up full pre-op labs (CBC/CMP/PT-INR/PTT/T&S)  --NPO at this time for possible operative intervention  --Continue to monitor clinically, notify NSGY immediately with any changes in neuro status    Dispo: ICU    Plan d/w Dr. Olson.

## 2023-12-04 NOTE — ED NOTES
Received report from RAUL Bradley. Pt currently in soft wrist restraints due to pt pulling at lines. Family/caregiver at bedside. Bed locked in lowest position. SR up x 2. Call light within reach.

## 2023-12-04 NOTE — PLAN OF CARE
Recommendations    1. ADAT as tolerated, texture per SLP.   2. Recommend ONS Boost Plus/VHS BID, if PO intake inadequate.   3. Consider adding MVI + B-complex w/ vitamin C.   4. RD to monitor to follow-up.    Goals: Meet % EEN/EPN by follow-up date.  Nutrition Goal Status: new  Communication of RD Recs: other (comment) (POC)

## 2023-12-04 NOTE — SUBJECTIVE & OBJECTIVE
Medications Prior to Admission   Medication Sig Dispense Refill Last Dose    donepezil (ARICEPT) 5 MG tablet Take 1 tablet (5 mg total) by mouth once daily. 30 tablet 11     memantine (NAMENDA) 10 MG Tab Take 5 mg by mouth 2 (two) times daily.       sildenafil (VIAGRA) 100 MG tablet Take 1 tablet (100 mg total) by mouth daily as needed for Erectile Dysfunction. 4 tablet 6     tadalafil (CIALIS) 20 MG Tab Take 1 tablet (20 mg total) by mouth daily as needed. Take 1 hour before intercourse 10 tablet 11     vardenafil (LEVITRA) 20 MG tablet Take 1 tablet (20 mg total) by mouth daily as needed for Erectile Dysfunction. Take 1 hour before intercourse. 10 tablet 11        Review of patient's allergies indicates:   Allergen Reactions    Strawberries [strawberry] Hives, Itching and Rash     Strawberries make patient itch and brake  out in hives with a rash and makes lips swell up.        Past Medical History:   Diagnosis Date    AR (allergic rhinitis)     Chronic hepatitis C without hepatic coma 8/1/2023    Hypertension     hereditary    Memory loss     Prostate cancer      History reviewed. No pertinent surgical history.  Family History       Problem Relation (Age of Onset)    Colon cancer Father, Sister    Hypertension Mother          Tobacco Use    Smoking status: Every Day     Current packs/day: 0.25     Types: Cigarettes    Smokeless tobacco: Never   Substance and Sexual Activity    Alcohol use: No    Drug use: No    Sexual activity: Yes     Partners: Female     Birth control/protection: None     Review of Systems   Unable to perform ROS: Dementia     Objective:     Weight: 85.3 kg (188 lb 0.8 oz)  Body mass index is 25.5 kg/m².  Vital Signs (Most Recent):  Temp: 97.3 °F (36.3 °C) (12/04/23 0435)  Pulse: 97 (12/04/23 0633)  Resp: (!) 24 (12/04/23 0601)  BP: (!) 146/104 (12/04/23 0601)  SpO2: 95 % (12/04/23 0601) Vital Signs (24h Range):  Temp:  [96.4 °F (35.8 °C)-97.4 °F (36.3 °C)] 97.3 °F (36.3 °C)  Pulse:  []  97  Resp:  [11-24] 24  SpO2:  [95 %-100 %] 95 %  BP: (106-244)/() 146/104                                 Physical Exam         Neurosurgery Physical Exam    [id number]  AO x 1  Bsi  Fc x 4 ag  SILT    Significant Labs:  Recent Labs   Lab 12/03/23 2141 12/04/23 0359   GLU 89 67*    145   K 3.1* 2.4*    101   CO2 30* 32*   BUN 16 17   CREATININE 0.9 0.8   CALCIUM 9.4 9.0     Recent Labs   Lab 12/03/23 2141 12/04/23 0359   WBC 7.34 5.69   HGB 12.0* 10.9*   HCT 35.8* 31.4*    149*     Recent Labs   Lab 12/04/23 0347 12/04/23 0359   INR 1.1 1.1   APTT 25.0  --      Microbiology Results (last 7 days)       Procedure Component Value Units Date/Time    Blood Culture #1 **CANNOT BE ORDERED STAT** [[id number]] Collected: 12/03/23 2140    Order Status: Completed Specimen: Blood from Peripheral, Forearm, Right Updated: 12/04/23 0515     Blood Culture, Routine No Growth to date    Blood Culture #2 **CANNOT BE ORDERED STAT** [[id number]] Collected: 12/03/23 2140    Order Status: Completed Specimen: Blood from Peripheral, Hand, Right Updated: 12/04/23 0515     Blood Culture, Routine No Growth to date          All pertinent labs from the last 24 hours have been reviewed.    Significant Diagnostics:  I have reviewed all pertinent imaging results/findings within the past 24 hours.  CT Head Without Contrast    Result Date: 12/4/2023  Redemonstration of left cerebellar hemorrhage, unchanged in size and configuration from prior exam with continued interventricular extension.  No significant interval detrimental change compared to prior head CT.  Clinical correlation and short-term repeat follow-up advised. Electronically signed by resident: Kayleigh Ritchie Date:    12/04/2023 Time:    04:49 Electronically signed by: Estefany Schreiber MD Date:    12/04/2023 Time:    05:54    CT Cervical Spine Without Contrast    Result Date: 12/4/2023  See above. Electronically signed by resident: Kayleigh Ritchie Date:    12/04/2023  Time:    03:33 Electronically signed by: Rafael Thomas MD Date:    12/04/2023 Time:    03:39    CT Head Without Contrast    Result Date: 12/4/2023  1. Left cerebellar intraparenchymal hemorrhage with intraventricular extension into the 4th ventricle. 2. No acute fracture or traumatic dislocation of the cervical spine. 3. Paranasal sinus disease. 4. Degenerative changes of the cervical spine most pronounced at C5-C6 with moderate spinal canal stenosis and severe bilateral neural foraminal narrowing. 5. Additional findings, as above. This report was flagged in Epic as abnormal. This critical information above was relayed by Kayleigh Ritchie MD  by telephone to Philomena Osuna MD on 12/4/2023 at 02:48. Electronically signed by resident: Kayleigh Ritchie Date:    12/04/2023 Time:    02:38 Electronically signed by: Rafael Thomas MD Date:    12/04/2023 Time:    03:16    X-Ray Forearm Left    Result Date: 12/3/2023  No acute radiographic abnormality. Electronically signed by: Sim Wilde Date:    12/03/2023 Time:    22:28    X-Ray Wrist Complete Left    Result Date: 12/3/2023  No acute radiographic abnormality. Electronically signed by: Sim Wilde Date:    12/03/2023 Time:    22:18    X-Ray Chest AP Portable    Result Date: 12/3/2023  No acute abnormality. Electronically signed by: John Valenzuela Date:    12/03/2023 Time:    22:12

## 2023-12-04 NOTE — PROVIDER PROGRESS NOTES - EMERGENCY DEPT.
Encounter Date: 12/3/2023    ED Physician Progress Notes        ED Physician Hand-off Note:    ED Course: I assumed care of patient from off-going ED physician team. Briefly, Patient is an 84 yo M with PMH of dementia who presents with acute mental status change    At the time of signout plan was pending - labs, UA, CT head.    Medications given in the ED:    Medications   ondansetron injection 4 mg (has no administration in time range)   mupirocin 2 % ointment ( Nasal Given 12/6/23 2011)   hydrALAZINE tablet 25 mg (25 mg Oral Given 12/6/23 2100)   memantine tablet 5 mg (5 mg Oral Given 12/6/23 2011)   senna-docusate 8.6-50 mg per tablet 1 tablet (1 tablet Oral Given 12/6/23 2011)   heparin (porcine) injection 5,000 Units (5,000 Units Subcutaneous Given 12/6/23 2100)   amLODIPine tablet 5 mg (5 mg Oral Given 12/6/23 2011)   labetalol 20 mg/4 mL (5 mg/mL) IV syring (10 mg Intravenous Given 12/6/23 2159)   haloperidol lactate injection 2.5 mg (2.5 mg Intramuscular Given 12/3/23 2102)   haloperidol lactate injection 2.5 mg (2.5 mg Intramuscular Given 12/3/23 2146)   LORazepam injection 1 mg (1 mg Intravenous Given 12/4/23 0159)   dextrose 10% bolus 150 mL 150 mL (150 mLs Intravenous New Bag 12/4/23 0431)   potassium chloride 10 mEq in 100 mL IVPB (0 mEq Intravenous Stopped 12/4/23 1139)   sodium chloride 0.9% bolus 500 mL 500 mL (0 mLs Intravenous Stopped 12/4/23 1324)   iohexoL (OMNIPAQUE 350) injection 75 mL (75 mLs Intravenous Given 12/5/23 8854)     Pt was given haldol x 2 prior to my arrival but will require more sedating medication to get to CT. I ordered ativan IV  Will also need straight cath urine  Labs so far unremarkable    Radiologist called to tell me patient has a L cerebellar hemorrhage with ventricular extension  I reviewed patient's chart. He does not appear to be on anticoagulation of antiplatelet    3:07 AM  Discussed with neurosurgery, vascular neurology, and neuro ICU    Pt given ativan and more  somnolent than before  Considered intubation, pt moved to room 2, work up more is not as somnolent  Neuro icu presented agrees we can hold on intubation     3:12 AM  I attempted to call pt's son but no answer, I did leave voicemail    Discussed with son- intubate if necessary    Pt going to neuro ICU    Critical Care  Date: 12/06/2023  Performed by: Philomena Osuna MD    Authorized by: Philomena Osuna MD   Total critical care time (exclusive of procedural time) : 35 minutes  Critical care was necessary to treat or prevent imminent or life-threatening deterioration of the following conditions:  ICH      Disposition: admit      Impression: Final diagnoses:  [F03.911] Agitation due to dementia  [M25.539] Wrist pain  [M79.603] Arm pain  [R41.0] Delirium (Primary)  [I61.4] Cerebellar hemorrhage, acute

## 2023-12-04 NOTE — PT/OT/SLP EVAL
Physical Therapy Co-Evaluation and Co-Treatment    Patient Name:  Sheng Virgen   MRN:  [id number]    Co-evaluation and co-treatment performed for this visit due to suspected patient need for two skilled therapists to ensure patient and staff safety and to accommodate for patient activity tolerance/pain management   Recommendations:     Discharge Recommendations: Moderate Intensity Therapy   Discharge Equipment Recommendations: hospital bed, bedside commode, lift device, wheelchair   Barriers to discharge: Increased level of assist    Assessment:     Sheng Virgen is a 83 y.o. 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 He presents with the following impairments/functional limitations: weakness, impaired endurance, impaired self care skills, impaired functional mobility, gait instability, impaired balance, impaired cognition, visual deficits, impaired coordination, decreased upper extremity function, decreased lower extremity function, decreased safety awareness. Pt with poor tolerance to therapy evaluation on this date. Pt with impaired cognitive status throughout with roughly 25% command following noted. Pt with limited visual attending and absent visual tracking while seated at EOB. Pt requiring increased assistance for functional mobility 2/2 generalized weakness, poor activity tolerance, AMS, poor trunk control, decreased responses to motor commands, and limited insight to current functional deficits. Pt's son stating that pt is operating below functional and neurological baseline at this time. Pt would require 24/7 assistance and supervision with current functional status. Recommend moderate intensity therapy following discharge once medically stable in order to reduce fall risk, reduce caregiver burden, improve quality of life, and maximize functional independence. Pt would continue to benefit from skilled acute PT in order to address current deficits and  "progress functional mobility.     Rehab Prognosis: Fair; patient would benefit from acute skilled PT services 3 x/week to address these deficits and reach maximum level of function.  Recent Surgery: * No surgery found *      Plan:     During this hospitalization, patient to be seen 3 x/week to address the identified rehab impairments via gait training, therapeutic activities, therapeutic exercises, neuromuscular re-education and progress toward the following goals:    Plan of Care Expires:  01/04/24    Subjective     Chief Complaint: None verbalized  Patient/Family Comments/Goals: "Is this a physical?"  Pain/Comfort:  Pain Rating 1: 0/10    Patients cultural, spiritual, Confucianist conflicts given the current situation: no    Living Environment:  Living Environment and PLOF gathered via phone from pt's son as pt is poor historian.   Pt stating, "I walk all the time."    Pt lives in [facility] with son with 0 COURTNEY. Pt's son reports having WIS and TSC. Pt requires increased assist with all ADLs PTA and was ambulating using RW. Pt's son stating, "I can't take care of him like I was anymore. Can we see about getting someone to come help with that?"  Objective:     Communicated with nursing prior to session. Patient found HOB elevated with bed alarm, blood pressure cuff, telemetry, pulse ox (continuous), Condom Catheter, SCD upon PT entry to room.    General Precautions: Standard, fall  Orthopedic Precautions:N/A    Braces: N/A    Exams:  Cognitive Exam:  Patient is oriented to Person, Not oriented to place, Not oriented to time, Not oriented to situation, follows commands 25% of the time  RLE ROM: WFL  LLE ROM: WFL  Strength: Unable to formally assess 2/2 pt with limited command following; AROM noted in BLE  Sensation: Unable to assess 2/2 pt with limited command following    Functional Mobility:  Bed Mobility: Verbal cues provided for technique and sequencing  Supine to Sit: total assistance of 2 persons  Sit to Supine: total " assistance of 2 persons  Transfers:  Deferred 2/2 poor static sitting balance and limited command following  Balance:   Static Sitting: Poor, able to maintain for 8 minute(s) with min-max (2/2 posterior lean)  Verbal cues for upright posture, reduction of posterior lean, improved use of BUE for support, increased anterior pelvic tilt, command following, visual attending, and visual tracking  Dynamic Sitting: Fair: Patient accepts minimal challenge, min-max assist  Pt completed facial grooming while seated at EOB with OT    Therapeutic Activities and Exercises:  Patient educated on role of acute care PT and PT POC, safety while in hospital including calling nurse for mobility, and call light usage  Answered all questions within PT scope of practice and addressed functional mobility concerns.  Pt educated on importance of maximal participation in therapy in order to reduce negative effects of prolonged sedentary positioning.    AM-PAC 6 CLICK MOBILITY  Total Score:7     Patient left HOB elevated with all lines intact, call button in reach, RN notified, and bed alarm on.    GOALS:   Multidisciplinary Problems       Physical Therapy Goals          Problem: Physical Therapy    Goal Priority Disciplines Outcome Goal Variances Interventions   Physical Therapy Goal     PT, PT/OT Ongoing, Progressing     Description: Goals to be met by: 2024     Patient will increase functional independence with mobility by performin. Supine to sit with Moderate Assistance  2. Sit to supine with Moderate Assistance  3. Sit to stand transfer with Moderate Assistance  4. Bed to chair transfer with Moderate Assistance using LRAD  5. Gait  x 20 feet with Moderate Assistance using LRAD.   6. Sitting at edge of bed x10 minutes with Contact Guard Assistance  7. Lower extremity exercise program x15 reps per handout, with assistance as needed                         History:     Past Medical History:   Diagnosis Date    AR (allergic  rhinitis)     Chronic hepatitis C without hepatic coma 8/1/2023    Hypertension     hereditary    Memory loss     Prostate cancer        History reviewed. No pertinent surgical history.    Time Tracking:     PT Received On: 12/04/23  PT Start Time: 1143     PT Stop Time: 1205  PT Total Time (min): 22 min     Billable Minutes: Evaluation 10 Neuromuscular Re-education 12      12/04/2023

## 2023-12-04 NOTE — NURSING
Pt transported to CT by RN via hospital bed at 1315 along with monitor, ambu bag. Pt tolerated scan well, returned to room at 1345. Reoriented to room, all alarms activated and audible. Pt resting comfortably with bed in low locked position.

## 2023-12-04 NOTE — ASSESSMENT & PLAN NOTE
-Stroke risk factor.  SBP<160.  -Cardene gtt started while the patient was in the ED.  -Review of his current list of home meds do not show the patient being prescribed anti-HTN meds.  Will review Care Everywhere.  Also needs to be discussed w/patient's family.

## 2023-12-04 NOTE — HPI
83M h/o dementia presents with increased confusion and decreased mental status. Per family  He has become quite confused and out of it over the last 2 days.  They reported he fell out of his chair yesterday and  He has been confused.   C Th in ED revealed Cerebellar ich with ivh into 4th ventricle. He is being admitted to [facility] for a higher level of care. No AC/AP.

## 2023-12-04 NOTE — ASSESSMENT & PLAN NOTE
IVH (intraventricular hemorrhage)   Sheng Virgen is a 83 y.o. male with a significant medical history of dementia, HTN, prostate CA who presents to the hospital for evaluation of AMS. 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    Antithrombotics for secondary stroke prevention: Antiplatelets: None: Intracerebral Hemorrhage    Statins for secondary stroke prevention and hyperlipidemia, if present:   Statins: None: Reason: Not indicated in acute ICH, however if LDL is >70 consider starting atorvastatin 40 mg for secondary stroke prevention    Aggressive risk factor modification: HTN, Smoking     Rehab efforts: The patient has been evaluated by a stroke team provider and the therapy needs have been fully considered based off the presenting complaints and exam findings. The following therapy evaluations are needed: PT evaluate and treat, OT evaluate and treat, SLP evaluate and treat    Diagnostics ordered/pending: CT scan of head without contrast to asses brain parenchyma, HgbA1C to assess blood glucose levels, Lipid Profile to assess cholesterol levels, TTE to assess cardiac function/status , TSH to assess thyroid function    VTE prophylaxis: Mechanical prophylaxis: Place SCDs  None: Reason for No Pharmacological VTE Prophylaxis: History of systemic or intracranial bleeding    BP parameters: ICH: SBP <160

## 2023-12-04 NOTE — ED NOTES
Pt still pulling at restraints, kicking and shouting. Pt unable to be brought to CT due to agitation and inability to sit still. Pt's BP also 230/155 currently. MD notified. Awaiting further orders at this time.

## 2023-12-04 NOTE — CONSULTS
[facility] - Emergency Dept  Vascular Neurology  Comprehensive Stroke Center  Consult Note    Inpatient consult to Vascular (Stroke) Neurology  Consult performed by: Roseanne Tipton DNP, NP  Consult ordered by: Philomena Osuna MD  Reason for consult: ICH        Assessment/Plan:     Brain compression  -Due to stroke and noted on imaging.  4th ventricle compression specifically noted.    -The patient is admitted to [facility] for close monitoring, hourly neuro checks.  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 male with a significant medical history of dementia, HTN, prostate CA who presents to the hospital for evaluation of AMS. 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    Antithrombotics for secondary stroke prevention: Antiplatelets: None: Intracerebral Hemorrhage    Statins for secondary stroke prevention and hyperlipidemia, if present:   Statins: None: Reason: Not indicated in acute ICH, however if LDL is >70 consider starting atorvastatin 40 mg for secondary stroke prevention    Aggressive risk factor modification: HTN, Smoking     Rehab efforts: The patient has been evaluated by a stroke team provider and the therapy needs have been fully considered based off the presenting complaints and exam findings. The following therapy evaluations are needed: PT evaluate and treat, OT evaluate and treat, SLP evaluate and treat    Diagnostics ordered/pending: CT scan of head without contrast to asses brain parenchyma, HgbA1C to assess blood glucose levels, Lipid Profile to assess cholesterol levels, TTE to assess cardiac function/status , TSH to assess thyroid function    VTE prophylaxis: Mechanical prophylaxis: Place SCDs  None: Reason for No Pharmacological VTE Prophylaxis: History of systemic or  intracranial bleeding    BP parameters: ICH: SBP <160        Hypertension  -Stroke risk factor.  SBP<160.  -Cardene gtt started while the patient was in the ED.  -Review of his current list of home meds do not show the patient being prescribed anti-HTN meds.  Will review Care Everywhere.  Also needs to be discussed w/patient's family.        STROKE DOCUMENTATION          NIH Scale:  Interval: baseline  1a. Level of Consciousness: 2-->Not alert, requires repeated stimulation to attend, or is obtunded and requires strong or painful stimulation to make movements (not stereotyped)  1b. LOC Questions: 2-->Answers neither question correctly  1c. LOC Commands: 2-->Performs neither task correctly  2. Best Gaze: 0-->Normal  3. Visual: 0-->No visual loss  4. Facial Palsy: 0-->Normal symmetrical movements  5a. Motor Arm, Left: 2-->Some effort against gravity, limb cannot get to or maintain (if cued) 90 (or 45) degrees, drifts down to bed, but has some effort against gravity  5b. Motor Arm, Right: 2-->Some effort against gravity, limb cannot get to or maintain (if cued) 90 (or 45) degrees, drifts down to bed, but has some effort against gravity  6a. Motor Leg, Left: 2-->Some effort against gravity, leg falls to bed by 5 secs, but has some effort against gravity  6b. Motor Leg, Right: 2-->Some effort against gravity, leg falls to bed by 5 secs, but has some effort against gravity  7. Limb Ataxia: 0-->Absent  8. Sensory: 2-->Severe to total sensory loss, patient is not aware of being touched in the face, arm, and leg  9. Best Language: 3-->Mute, global aphasia, no usable speech or auditory comprehension  10. Dysarthria: 2-->Severe dysarthria, patients speech is so slurred as to be unintelligible in the absence of or out of proportion to any dysphasia, or is mute/anarthric  11. Extinction and Inattention (formerly Neglect): 2-->Profound mary-inattention/extinction more than 1 modality  Total (NIH Stroke Scale): 23    Modified  "Odem Score: 3  Jay Coma Scale:5   ABCD2 Score:    [id number] Score:   HAS -BLED Score:   ICH Score:4  Hunt & Prasad Classification:       Thrombolysis Candidate? No, CT findings (ICH, SAH, Large core infarct)     Delays to Thrombolysis?  Not Applicable    Interventional Revascularization Candidate?   Is the patient eligible for mechanical endovascular reperfusion (MEGHAN)?   No, ICH    Delays to Thrombectomy? Not Applicable    Hemorrhagic change of an Ischemic Stroke: Does this patient have an ischemic stroke with hemorrhagic changes? No     Subjective:     History of Present Illness:  Sheng Virgen is a 83 y.o. male with a significant medical history of dementia, HTN, prostate CA who presents to the hospital for evaluation of AMS.  HPI information gathered from review of the patient's medical record due to the patient currently being unresponsive, history of dementia.  Per the ED provider note:  "   Fatigue       Generalized weakness getting progressively worse, strong urine odor, incontinent of urine      Mr. Virgen is an 83-year-old male with history of dementia.  He has become quite confused and out of it over the last 2 days.  He fell out of his chair yesterday.  He has been confused.  There has been no fever or vomiting.  Family has never seen him like this."    The patient had been confused and agitated in the ED.  He was given a total of 5 mg of Haldol and 1 mg of Ativan.  He was also extremely hypertensive, BPs 240s/160s initially.          Past Medical History:   Diagnosis Date    AR (allergic rhinitis)     Hypertension     hereditary    Memory loss     Prostate cancer      History reviewed. No pertinent surgical history.    Family History   Problem Relation Age of Onset    Hypertension Mother     Colon cancer Father     Colon cancer Sister      Social History     Tobacco Use    Smoking status: Every Day     Current packs/day: 0.25     Types: Cigarettes    Smokeless tobacco: Never   Substance Use Topics    " Alcohol use: No    Drug use: No     Review of patient's allergies indicates:   Allergen Reactions    Strawberries [strawberry] Hives, Itching and Rash     Strawberries make patient itch and brake  out in hives with a rash and makes lips swell up.        Medications: I have reviewed the current medication administration record.    Current Outpatient Medications   Medication Instructions    donepeziL (ARICEPT) 5 mg, Oral, Daily    memantine (NAMENDA) 5 mg, Oral, 2 times daily    sildenafiL (VIAGRA) 100 mg, Oral, Daily PRN    tadalafiL (CIALIS) 20 mg, Oral, Daily PRN, Take 1 hour before intercourse    vardenafiL (LEVITRA) 20 mg, Oral, Daily PRN, Take 1 hour before intercourse.         Review of Systems   Unable to perform ROS: Mental status change   Gastrointestinal:  Positive for vomiting.   Neurological:  Positive for speech difficulty.   Psychiatric/Behavioral:  Positive for agitation and confusion.      Objective:     Vital Signs (Most Recent):  Temp: 96.4 °F (35.8 °C) (12/04/23 0348)  Pulse: 83 (12/04/23 0417)  Resp: 11 (12/04/23 0417)  BP: 131/83 (12/04/23 0416)  SpO2: 99 % (12/04/23 0417)    Vital Signs Range (Last 24H):  Temp:  [96.4 °F (35.8 °C)-97.4 °F (36.3 °C)]   Pulse:  [80-90]   Resp:  [11-16]   BP: (131-244)/()   SpO2:  [99 %-100 %]        Physical Exam  Vitals and nursing note reviewed.   Constitutional:       Appearance: He is underweight. He is ill-appearing.      Interventions: He is sedated and restrained.      Comments: Snoring respirations w/sternal rub   Eyes:      General: No scleral icterus.        Right eye: No discharge.         Left eye: No discharge.      Conjunctiva/sclera: Conjunctivae normal.      Pupils: Pupils are equal, round, and reactive to light.   Cardiovascular:      Rate and Rhythm: Normal rate.   Pulmonary:      Effort: Bradypnea present.   Abdominal:      General: There is no distension.   Musculoskeletal:      Right lower leg: No edema.      Left lower leg: No edema.  "  Skin:     General: Skin is warm and dry.   Neurological:      Mental Status: He is unresponsive.      GCS: GCS eye subscore is 1. GCS verbal subscore is 1. GCS motor subscore is 3.              Neurological Exam:   LOC: obtunded  Attention Span: poor  Language: Global aphasia  Pupils (CN II, III): PERRL      Laboratory:  CMP:   Recent Labs   Lab 12/03/23  2141   CALCIUM 9.4   ALBUMIN 3.4*   PROT 8.3      K 3.1*   CO2 30*      BUN 16   CREATININE 0.9   ALKPHOS 53*   ALT 13   AST 39   BILITOT 0.9     CBC:   Recent Labs   Lab 12/04/23  0359   WBC 5.69   RBC 3.58*   HGB 10.9*   HCT 31.4*   *   MCV 88   MCH 30.4   MCHC 34.7     Lipid Panel: No results for input(s): "CHOL", "LDLCALC", "HDL", "TRIG" in the last 168 hours.  Coagulation:   Recent Labs   Lab 12/04/23  0347 12/04/23  0359   INR 1.1 1.1   APTT 25.0  --      Hgb A1C:   Recent Labs   Lab 12/04/23  0359   HGBA1C 4.8     TSH: No results for input(s): "TSH" in the last 168 hours.    Diagnostic Results:      Brain imaging:  [facility] 12/4/2023 @0401    [facility] 12/4/2023 @0219 Impression: 1. Left cerebellar intraparenchymal hemorrhage with intraventricular extension into the 4th ventricle.    Vessel Imaging:  None    Cardiac Evaluation:   EKG 12/3/2023 Sinus tachycardia      Roseanne Tipton, MELL, NP  [facility] St[facility]  Department of Vascular Neurology   [facility] - Emergency Dept   "

## 2023-12-04 NOTE — PT/OT/SLP PROGRESS
Speech Language Pathology    Sheng Virgen  MRN: [id number]    SLP Evaluation orders received and reviewed with RN and MD team. Upon am attempt, Patient working with therapy. Upon second attempt, Patient off the floor for testing. Upon third attempt, Patient found upright in bed with RNs at the bedside upon SLP entrance to room. RN explained Patient passed [place] and tolerated medications in puree. Patient in deep sleep state and did not wake per max verbal and tactile cues from RN. PO trials deferred due to lethargy. Patient not seen today secondary to unavailable, Testing/(CT), lethargy upon SLP attempts.  Will follow-up to re-attempt 12/5/23 pending medical status and appropriateness for PO trials.    12/4/2023

## 2023-12-04 NOTE — PLAN OF CARE
Problem: Occupational Therapy  Goal: Occupational Therapy Goal  Description: Goals to be met by: 1/4/23     Patient will increase functional independence with ADLs by performing:    UE Dressing with Moderate Assistance.  LE Dressing with Moderate Assistance.  Grooming while seated with Moderate Assistance.  Toileting from bedside commode with Moderate Assistance for hygiene and clothing management.   Stand pivot transfers with Moderate Assistance.  Toilet transfer to bedside commode with Moderate Assistance.    Outcome: Ongoing, Progressing

## 2023-12-04 NOTE — ED NOTES
Pt found snoring in room. Unable to follow commands or awaken to sternal rub. Pupils constricted. MARIELY Tipton at bedside.

## 2023-12-04 NOTE — ED TRIAGE NOTES
Sehng Virgen, a 83 y.o. male presents to the ED w/ complaint of loss of appetite, fall 2x days ago. Strong smelling urine. Hx dementia. Low O2 with EMS. Arrives on 4L O2    Triage note:  Chief Complaint   Patient presents with    Fatigue     Generalized weakness getting progressively worse, strong urine odor, incontinent of urine     Review of patient's allergies indicates:   Allergen Reactions    Strawberries [strawberry] Hives, Itching and Rash     Strawberries make patient itch and brake  out in hives with a rash and makes lips swell up.      Past Medical History:   Diagnosis Date    AR (allergic rhinitis)     Hypertension     hereditary    Memory loss     Prostate cancer

## 2023-12-04 NOTE — ED NOTES
Pt uncooperative during blood collection and IV placement. MD aware. Pt placed in soft wrist restraints and mesh face covering dt trying to spit on staff. Family at bedside

## 2023-12-04 NOTE — CONSULTS
[facility]  Neurosurgery  Consult Note    Inpatient consult to Neurosurgery  Consult performed by: Sung Brock MD  Consult ordered by: Philomena Osuna MD        Subjective:     Chief Complaint/Reason for Admission: ICH    History of Present Illness: 83M h/o dementia presents with increased confusion and decreased mental status. Per family  He has become quite confused and out of it over the last 2 days.  They reported he fell out of his chair yesterday and  He has been confused.   C Th in ED revealed Cerebellar ich with ivh into 4th ventricle. He is being admitted to [facility] for a higher level of care. No AC/AP.    Medications Prior to Admission   Medication Sig Dispense Refill Last Dose    donepezil (ARICEPT) 5 MG tablet Take 1 tablet (5 mg total) by mouth once daily. 30 tablet 11     memantine (NAMENDA) 10 MG Tab Take 5 mg by mouth 2 (two) times daily.       sildenafil (VIAGRA) 100 MG tablet Take 1 tablet (100 mg total) by mouth daily as needed for Erectile Dysfunction. 4 tablet 6     tadalafil (CIALIS) 20 MG Tab Take 1 tablet (20 mg total) by mouth daily as needed. Take 1 hour before intercourse 10 tablet 11     vardenafil (LEVITRA) 20 MG tablet Take 1 tablet (20 mg total) by mouth daily as needed for Erectile Dysfunction. Take 1 hour before intercourse. 10 tablet 11        Review of patient's allergies indicates:   Allergen Reactions    Strawberries [strawberry] Hives, Itching and Rash     Strawberries make patient itch and brake  out in hives with a rash and makes lips swell up.        Past Medical History:   Diagnosis Date    AR (allergic rhinitis)     Chronic hepatitis C without hepatic coma 8/1/2023    Hypertension     hereditary    Memory loss     Prostate cancer      History reviewed. No pertinent surgical history.  Family History       Problem Relation (Age of Onset)    Colon cancer Father, Sister    Hypertension Mother          Tobacco Use    Smoking status: Every Day      Current packs/day: 0.25     Types: Cigarettes    Smokeless tobacco: Never   Substance and Sexual Activity    Alcohol use: No    Drug use: No    Sexual activity: Yes     Partners: Female     Birth control/protection: None     Review of Systems   Unable to perform ROS: Dementia     Objective:     Weight: 85.3 kg (188 lb 0.8 oz)  Body mass index is 25.5 kg/m².  Vital Signs (Most Recent):  Temp: 97.3 °F (36.3 °C) (12/04/23 0435)  Pulse: 97 (12/04/23 0633)  Resp: (!) 24 (12/04/23 0601)  BP: (!) 146/104 (12/04/23 0601)  SpO2: 95 % (12/04/23 0601) Vital Signs (24h Range):  Temp:  [96.4 °F (35.8 °C)-97.4 °F (36.3 °C)] 97.3 °F (36.3 °C)  Pulse:  [] 97  Resp:  [11-24] 24  SpO2:  [95 %-100 %] 95 %  BP: (106-244)/() 146/104                                 Physical Exam         Neurosurgery Physical Exam    [id number]  AO x 1  Bsi  Fc x 4 ag  SILT    Significant Labs:  Recent Labs   Lab 12/03/23 2141 12/04/23  0359   GLU 89 67*    145   K 3.1* 2.4*    101   CO2 30* 32*   BUN 16 17   CREATININE 0.9 0.8   CALCIUM 9.4 9.0     Recent Labs   Lab 12/03/23 2141 12/04/23  0359   WBC 7.34 5.69   HGB 12.0* 10.9*   HCT 35.8* 31.4*    149*     Recent Labs   Lab 12/04/23  0347 12/04/23  0359   INR 1.1 1.1   APTT 25.0  --      Microbiology Results (last 7 days)       Procedure Component Value Units Date/Time    Blood Culture #1 **CANNOT BE ORDERED STAT** [[id number]] Collected: 12/03/23 2140    Order Status: Completed Specimen: Blood from Peripheral, Forearm, Right Updated: 12/04/23 0515     Blood Culture, Routine No Growth to date    Blood Culture #2 **CANNOT BE ORDERED STAT** [[id number]] Collected: 12/03/23 2140    Order Status: Completed Specimen: Blood from Peripheral, Hand, Right Updated: 12/04/23 0515     Blood Culture, Routine No Growth to date          All pertinent labs from the last 24 hours have been reviewed.    Significant Diagnostics:  I have reviewed all pertinent imaging results/findings within  the past 24 hours.  CT Head Without Contrast    Result Date: 12/4/2023  Redemonstration of left cerebellar hemorrhage, unchanged in size and configuration from prior exam with continued interventricular extension.  No significant interval detrimental change compared to prior head CT.  Clinical correlation and short-term repeat follow-up advised. Electronically signed by resident: Kayleigh Ritchie Date:    12/04/2023 Time:    04:49 Electronically signed by: Estefany Schreiber MD Date:    12/04/2023 Time:    05:54    CT Cervical Spine Without Contrast    Result Date: 12/4/2023  See above. Electronically signed by resident: Kayleigh Ritchie Date:    12/04/2023 Time:    03:33 Electronically signed by: Rafael Thomas MD Date:    12/04/2023 Time:    03:39    CT Head Without Contrast    Result Date: 12/4/2023  1. Left cerebellar intraparenchymal hemorrhage with intraventricular extension into the 4th ventricle. 2. No acute fracture or traumatic dislocation of the cervical spine. 3. Paranasal sinus disease. 4. Degenerative changes of the cervical spine most pronounced at C5-C6 with moderate spinal canal stenosis and severe bilateral neural foraminal narrowing. 5. Additional findings, as above. This report was flagged in Epic as abnormal. This critical information above was relayed by Kayleigh Ritchie MD  by telephone to Philomena Osuna MD on 12/4/2023 at 02:48. Electronically signed by resident: Kayleigh Ritchie Date:    12/04/2023 Time:    02:38 Electronically signed by: Rafael Thomas MD Date:    12/04/2023 Time:    03:16    X-Ray Forearm Left    Result Date: 12/3/2023  No acute radiographic abnormality. Electronically signed by: Sim Wilde Date:    12/03/2023 Time:    22:28    X-Ray Wrist Complete Left    Result Date: 12/3/2023  No acute radiographic abnormality. Electronically signed by: Sim Wilde Date:    12/03/2023 Time:    22:18    X-Ray Chest AP Portable    Result Date: 12/3/2023  No acute abnormality. Electronically signed  by: John Valenzuela Date:    12/03/2023 Time:    22:12     Assessment/Plan:     * 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  83M h/o dementia presents with increased confusion and decreased mental status. Per family  He has become quite confused and out of it over the last 2 days.  They reported he fell out of his chair yesterday and  He has been confused.   C Th in ED revealed Cerebellar ich with ivh into 4th ventricle. He is being admitted to [facility] for a higher level of care. No AC/AP.    ICHS 2    --Patient admitted to ICU on telemetry      -q1h neurochecks in ICU, q2h neurochecks in stepdown, q4h neurochecks on floor  --All labs and diagnostics reviewed  --Follow-up CTH and 6h scan for stability      -CTA tomorrow AM  --Hold anti-plt/coag medications  --SBP <140 (cardene ggt; hydralazine & labetalol PRN; transition to home meds when appropriate)  --Na >135  --HOB >30  --Follow-up full pre-op labs (CBC/CMP/PT-INR/PTT/T&S)  --NPO at this time for possible operative intervention  --Continue to monitor clinically, notify NSGY immediately with any changes in neuro status    Dispo: ICU    Plan d/w Dr. Olson.        Thank you for your consult. I will follow-up with patient. Please contact us if you have any additional questions.    Sung Brock MD  Neurosurgery  [facility] - [facility]

## 2023-12-04 NOTE — CONSULTS
[facility]  Adult Nutrition  Consult Note    SUMMARY     Recommendations    1. ADAT as tolerated, texture per SLP.   2. Recommend ONS Boost Plus/VHS BID, if PO intake inadequate.   3. Consider adding MVI + B-complex w/ vitamin C.   4. RD to monitor to follow-up.    Goals: Meet % EEN/EPN by follow-up date.  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 Patient is confused, unsure of baseline no family at bedside patient has a h/o dementia. SLP has not evaluated as of yet. RN states patient requires consistent prompting/encouragement. Wt stable per chart.  Nutrition Discharge Planning: Pending Clinical Course    Nutrition Risk Screen         Nutrition/Diet History    Patient Reported Diet/Restrictions/Preferences: other (see comments) (Intubated/sedated)  Food Allergies: other (see comments) (Strawberries)  Factors Affecting Nutritional Intake: NPO, on mechanical ventilation    Anthropometrics    Temp: 97.5 °F (36.4 °C)  Height Method: Estimated  Height: 6' (182.9 cm)  Height (inches): 72 in  Weight Method: Bed Scale  Weight: 64.5 kg (142 lb 3.2 oz)  Weight (lb): 142.2 lb  Ideal Body Weight (IBW), Male: 178 lb  % Ideal Body Weight, Male (lb): 79.89 %  BMI (Calculated):  19.3       Lab/Procedures/Meds    Pertinent Labs Reviewed: reviewed  Pertinent Labs Comments: K 2.4, CO2 32, Glu 67, ALP 49  Pertinent Medications Reviewed: reviewed  Pertinent Medications Comments: nicardipine, hydralazine, NaCl    Estimated/Assessed Needs    Weight Used For Calorie Calculations: 85.3 kg (188 lb)  Energy Calorie Requirements (kcal): 1982 kcal/d (MSJ x 1.25)  Energy Need Method: Holloway-St Jeor  Protein Requirements: 77 g/d (0.9 g/kg)  Weight Used For Protein Calculations: 85.3 kg (188 lb)        RDA Method (mL): 1982         Nutrition Prescription Ordered    Current Diet Order: NPO    Evaluation of Received Nutrient/Fluid Intake    I/O: +365.1 mL  Comments: LBM: 12/2  % Intake of Estimated Energy Needs: 75 - 100 %  % Meal Intake: NPO    Nutrition Risk    Level of Risk/Frequency of Follow-up: low (1x/ week)       Monitor and Evaluation    Food and Nutrient Intake: energy intake, food and beverage intake  Food and Nutrient Adminstration: diet order  Physical Activity and Function: nutrition-related ADLs and IADLs, factors affecting access to physical activity  Anthropometric Measurements: weight, weight change, body mass index  Biochemical Data, Medical Tests and Procedures: lipid profile, inflammatory profile, glucose/endocrine profile, gastrointestinal profile, electrolyte and renal panel  Nutrition-Focused Physical Findings: skin, head and eyes, extremities, muscles and bones, overall appearance       Nutrition Follow-Up    RD Follow-up?: Yes    [facility] Registration Eligible, Provisional LDN

## 2023-12-04 NOTE — HPI
"Sheng Virgen is a 83 y.o. male with a significant medical history of dementia, HTN, prostate CA who presents to the hospital for evaluation of AMS.  HPI information gathered from review of the patient's medical record due to the patient currently being unresponsive, history of dementia.  Per the ED provider note:  "   Fatigue       Generalized weakness getting progressively worse, strong urine odor, incontinent of urine      Mr. Virgen is an 83-year-old male with history of dementia.  He has become quite confused and out of it over the last 2 days.  He fell out of his chair yesterday.  He has been confused.  There has been no fever or vomiting.  Family has never seen him like this."    The patient had been confused and agitated in the ED.  He was given a total of 5 mg of Haldol and 1 mg of Ativan.  He was also extremely hypertensive, BPs 240s/160s initially.  "

## 2023-12-04 NOTE — SUBJECTIVE & OBJECTIVE
Past Medical History:   Diagnosis Date    AR (allergic rhinitis)     Hypertension     hereditary    Memory loss     Prostate cancer      History reviewed. No pertinent surgical history.    Family History   Problem Relation Age of Onset    Hypertension Mother     Colon cancer Father     Colon cancer Sister      Social History     Tobacco Use    Smoking status: Every Day     Current packs/day: 0.25     Types: Cigarettes    Smokeless tobacco: Never   Substance Use Topics    Alcohol use: No    Drug use: No     Review of patient's allergies indicates:   Allergen Reactions    Strawberries [strawberry] Hives, Itching and Rash     Strawberries make patient itch and brake  out in hives with a rash and makes lips swell up.        Medications: I have reviewed the current medication administration record.    Current Outpatient Medications   Medication Instructions    donepeziL (ARICEPT) 5 mg, Oral, Daily    memantine (NAMENDA) 5 mg, Oral, 2 times daily    sildenafiL (VIAGRA) 100 mg, Oral, Daily PRN    tadalafiL (CIALIS) 20 mg, Oral, Daily PRN, Take 1 hour before intercourse    vardenafiL (LEVITRA) 20 mg, Oral, Daily PRN, Take 1 hour before intercourse.         Review of Systems   Unable to perform ROS: Mental status change   Gastrointestinal:  Positive for vomiting.   Neurological:  Positive for speech difficulty.   Psychiatric/Behavioral:  Positive for agitation and confusion.      Objective:     Vital Signs (Most Recent):  Temp: 96.4 °F (35.8 °C) (12/04/23 0348)  Pulse: 83 (12/04/23 0417)  Resp: 11 (12/04/23 0417)  BP: 131/83 (12/04/23 0416)  SpO2: 99 % (12/04/23 0417)    Vital Signs Range (Last 24H):  Temp:  [96.4 °F (35.8 °C)-97.4 °F (36.3 °C)]   Pulse:  [80-90]   Resp:  [11-16]   BP: (131-244)/()   SpO2:  [99 %-100 %]        Physical Exam  Vitals and nursing note reviewed.   Constitutional:       Appearance: He is underweight. He is ill-appearing.      Interventions: He is sedated and restrained.      Comments:  "Snoring respirations w/sternal rub   Eyes:      General: No scleral icterus.        Right eye: No discharge.         Left eye: No discharge.      Conjunctiva/sclera: Conjunctivae normal.      Pupils: Pupils are equal, round, and reactive to light.   Cardiovascular:      Rate and Rhythm: Normal rate.   Pulmonary:      Effort: Bradypnea present.   Abdominal:      General: There is no distension.   Musculoskeletal:      Right lower leg: No edema.      Left lower leg: No edema.   Skin:     General: Skin is warm and dry.   Neurological:      Mental Status: He is unresponsive.      GCS: GCS eye subscore is 1. GCS verbal subscore is 1. GCS motor subscore is 3.              Neurological Exam:   LOC: obtunded  Attention Span: poor  Language: Global aphasia  Pupils (CN II, III): PERRL      Laboratory:  CMP:   Recent Labs   Lab 12/03/23  2141   CALCIUM 9.4   ALBUMIN 3.4*   PROT 8.3      K 3.1*   CO2 30*      BUN 16   CREATININE 0.9   ALKPHOS 53*   ALT 13   AST 39   BILITOT 0.9     CBC:   Recent Labs   Lab 12/04/23  0359   WBC 5.69   RBC 3.58*   HGB 10.9*   HCT 31.4*   *   MCV 88   MCH 30.4   MCHC 34.7     Lipid Panel: No results for input(s): "CHOL", "LDLCALC", "HDL", "TRIG" in the last 168 hours.  Coagulation:   Recent Labs   Lab 12/04/23  0347 12/04/23  0359   INR 1.1 1.1   APTT 25.0  --      Hgb A1C:   Recent Labs   Lab 12/04/23  0359   HGBA1C 4.8     TSH: No results for input(s): "TSH" in the last 168 hours.    Diagnostic Results:      Brain imaging:  [facility] 12/4/2023 @0401    CT 12/4/2023 @0219 Impression: 1. Left cerebellar intraparenchymal hemorrhage with intraventricular extension into the 4th ventricle.    Vessel Imaging:  None    Cardiac Evaluation:   EKG 12/3/2023 Sinus tachycardia    "

## 2023-12-05 LAB
ALBUMIN SERPL BCP-MCNC: 3.3 G/DL (ref 3.5–5.2)
ALP SERPL-CCNC: 53 U/L (ref 55–135)
ALT SERPL W/O P-5'-P-CCNC: 13 U/L (ref 10–44)
ANION GAP SERPL CALC-SCNC: 10 MMOL/L (ref 8–16)
ASCENDING AORTA: 3.17 CM
AST SERPL-CCNC: 37 U/L (ref 10–40)
AV INDEX (PROSTH): 0.84
AV MEAN GRADIENT: 8 MMHG
AV PEAK GRADIENT: 12 MMHG
AV VALVE AREA BY VELOCITY RATIO: 2.49 CM²
AV VALVE AREA: 2.92 CM²
AV VELOCITY RATIO: 0.72
BASOPHILS # BLD AUTO: 0.03 K/UL (ref 0–0.2)
BASOPHILS NFR BLD: 0.5 % (ref 0–1.9)
BILIRUB SERPL-MCNC: 0.7 MG/DL (ref 0.1–1)
BSA FOR ECHO PROCEDURE: 1.81 M2
BUN SERPL-MCNC: 14 MG/DL (ref 8–23)
CALCIUM SERPL-MCNC: 9.2 MG/DL (ref 8.7–10.5)
CHLORIDE SERPL-SCNC: 102 MMOL/L (ref 95–110)
CO2 SERPL-SCNC: 30 MMOL/L (ref 23–29)
CREAT SERPL-MCNC: 0.9 MG/DL (ref 0.5–1.4)
CV ECHO LV RWT: 0.77 CM
DIFFERENTIAL METHOD: ABNORMAL
DOP CALC AO PEAK VEL: 1.71 M/S
DOP CALC AO VTI: 26.66 CM
DOP CALC LVOT AREA: 3.5 CM2
DOP CALC LVOT DIAMETER: 2.1 CM
DOP CALC LVOT PEAK VEL: 1.23 M/S
DOP CALC LVOT STROKE VOLUME: 77.89 CM3
DOP CALCLVOT PEAK VEL VTI: 22.5 CM
E WAVE DECELERATION TIME: 149.68 MSEC
E/A RATIO: 0.57
E/E' RATIO: 15.25 M/S
ECHO LV POSTERIOR WALL: 1.13 CM (ref 0.6–1.1)
EOSINOPHIL # BLD AUTO: 0.1 K/UL (ref 0–0.5)
EOSINOPHIL NFR BLD: 1.1 % (ref 0–8)
ERYTHROCYTE [DISTWIDTH] IN BLOOD BY AUTOMATED COUNT: 16.2 % (ref 11.5–14.5)
EST. GFR  (NO RACE VARIABLE): >60 ML/MIN/1.73 M^2
FRACTIONAL SHORTENING: 41 % (ref 28–44)
GLUCOSE SERPL-MCNC: 68 MG/DL (ref 70–110)
HCT VFR BLD AUTO: 34.2 % (ref 40–54)
HGB BLD-MCNC: 11.4 G/DL (ref 14–18)
HR MV ECHO: 96 BPM
IMM GRANULOCYTES # BLD AUTO: 0.02 K/UL (ref 0–0.04)
IMM GRANULOCYTES NFR BLD AUTO: 0.4 % (ref 0–0.5)
INTERVENTRICULAR SEPTUM: 1.22 CM (ref 0.6–1.1)
IVRT: 95.15 MSEC
LA MAJOR: 5.13 CM
LA MINOR: 5.91 CM
LA WIDTH: 3.53 CM
LEFT ATRIUM SIZE: 3.37 CM
LEFT ATRIUM VOLUME INDEX MOD: 34.2 ML/M2
LEFT ATRIUM VOLUME INDEX: 30.2 ML/M2
LEFT ATRIUM VOLUME MOD: 62.84 CM3
LEFT ATRIUM VOLUME: 55.54 CM3
LEFT INTERNAL DIMENSION IN SYSTOLE: 1.73 CM (ref 2.1–4)
LEFT VENTRICLE DIASTOLIC VOLUME INDEX: 17.83 ML/M2
LEFT VENTRICLE DIASTOLIC VOLUME: 32.81 ML
LEFT VENTRICLE MASS INDEX: 55 G/M2
LEFT VENTRICLE SYSTOLIC VOLUME INDEX: 4.7 ML/M2
LEFT VENTRICLE SYSTOLIC VOLUME: 8.73 ML
LEFT VENTRICULAR INTERNAL DIMENSION IN DIASTOLE: 2.92 CM (ref 3.5–6)
LEFT VENTRICULAR MASS: 101.66 G
LV LATERAL E/E' RATIO: 20.33 M/S
LV SEPTAL E/E' RATIO: 12.2 M/S
LYMPHOCYTES # BLD AUTO: 1.5 K/UL (ref 1–4.8)
LYMPHOCYTES NFR BLD: 27.3 % (ref 18–48)
MAGNESIUM SERPL-MCNC: 2.1 MG/DL (ref 1.6–2.6)
MCH RBC QN AUTO: 30.6 PG (ref 27–31)
MCHC RBC AUTO-ENTMCNC: 33.3 G/DL (ref 32–36)
MCV RBC AUTO: 92 FL (ref 82–98)
MONOCYTES # BLD AUTO: 0.3 K/UL (ref 0.3–1)
MONOCYTES NFR BLD: 5.6 % (ref 4–15)
MRSA ID BY PCR: NEGATIVE
MV MEAN GRADIENT: 8 MMHG
MV PEAK A VEL: 2.14 M/S
MV PEAK E VEL: 1.22 M/S
MV PEAK GRADIENT: 17 MMHG
MV STENOSIS PRESSURE HALF TIME: 43.41 MS
MV VALVE AREA P 1/2 METHOD: 5.07 CM2
NEUTROPHILS # BLD AUTO: 3.6 K/UL (ref 1.8–7.7)
NEUTROPHILS NFR BLD: 65.1 % (ref 38–73)
NRBC BLD-RTO: 0 /100 WBC
PHOSPHATE SERPL-MCNC: 2.8 MG/DL (ref 2.7–4.5)
PISA TR MAX VEL: 2.88 M/S
PLATELET # BLD AUTO: 190 K/UL (ref 150–450)
PMV BLD AUTO: 9 FL (ref 9.2–12.9)
POTASSIUM SERPL-SCNC: 3.7 MMOL/L (ref 3.5–5.1)
PROT SERPL-MCNC: 7.8 G/DL (ref 6–8.4)
RA MAJOR: 4.73 CM
RA PRESSURE ESTIMATED: 3 MMHG
RA WIDTH: 3.51 CM
RBC # BLD AUTO: 3.72 M/UL (ref 4.6–6.2)
RIGHT VENTRICULAR END-DIASTOLIC DIMENSION: 3 CM
RV TB RVSP: 6 MMHG
SINUS: 3.8 CM
SODIUM SERPL-SCNC: 142 MMOL/L (ref 136–145)
STAPH AUREUS ID BY PCR: NEGATIVE
STJ: 3.28 CM
TDI LATERAL: 0.06 M/S
TDI SEPTAL: 0.1 M/S
TDI: 0.08 M/S
TR MAX PG: 33 MMHG
TRICUSPID ANNULAR PLANE SYSTOLIC EXCURSION: 2.06 CM
TV REST PULMONARY ARTERY PRESSURE: 36 MMHG
WBC # BLD AUTO: 5.53 K/UL (ref 3.9–12.7)
Z-SCORE OF LEFT VENTRICULAR DIMENSION IN END DIASTOLE: -5.59
Z-SCORE OF LEFT VENTRICULAR DIMENSION IN END SYSTOLE: -4.74

## 2023-12-05 PROCEDURE — 80053 COMPREHEN METABOLIC PANEL: CPT | Performed by: NURSE PRACTITIONER

## 2023-12-05 PROCEDURE — 84100 ASSAY OF PHOSPHORUS: CPT | Performed by: NURSE PRACTITIONER

## 2023-12-05 PROCEDURE — 25000003 PHARM REV CODE 250: Performed by: PHYSICIAN ASSISTANT

## 2023-12-05 PROCEDURE — 25000003 PHARM REV CODE 250: Performed by: EMERGENCY MEDICINE

## 2023-12-05 PROCEDURE — 99232 PR SUBSEQUENT HOSPITAL CARE,LEVL II: ICD-10-PCS | Mod: ,,, | Performed by: STUDENT IN AN ORGANIZED HEALTH CARE EDUCATION/TRAINING PROGRAM

## 2023-12-05 PROCEDURE — 25000003 PHARM REV CODE 250: Performed by: REGISTERED NURSE

## 2023-12-05 PROCEDURE — 63600175 PHARM REV CODE 636 W HCPCS: Performed by: NURSE PRACTITIONER

## 2023-12-05 PROCEDURE — 83735 ASSAY OF MAGNESIUM: CPT | Performed by: NURSE PRACTITIONER

## 2023-12-05 PROCEDURE — 25500020 PHARM REV CODE 255: Performed by: PSYCHIATRY & NEUROLOGY

## 2023-12-05 PROCEDURE — 85025 COMPLETE CBC W/AUTO DIFF WBC: CPT | Performed by: NURSE PRACTITIONER

## 2023-12-05 PROCEDURE — 99233 SBSQ HOSP IP/OBS HIGH 50: CPT | Mod: GC,,, | Performed by: PSYCHIATRY & NEUROLOGY

## 2023-12-05 PROCEDURE — 20000000 HC ICU ROOM

## 2023-12-05 PROCEDURE — 99233 SBSQ HOSP IP/OBS HIGH 50: CPT | Mod: ,,, | Performed by: PSYCHIATRY & NEUROLOGY

## 2023-12-05 PROCEDURE — 99900035 HC TECH TIME PER 15 MIN (STAT)

## 2023-12-05 PROCEDURE — 63600175 PHARM REV CODE 636 W HCPCS: Performed by: REGISTERED NURSE

## 2023-12-05 PROCEDURE — 99232 SBSQ HOSP IP/OBS MODERATE 35: CPT | Mod: ,,, | Performed by: STUDENT IN AN ORGANIZED HEALTH CARE EDUCATION/TRAINING PROGRAM

## 2023-12-05 PROCEDURE — 92610 EVALUATE SWALLOWING FUNCTION: CPT

## 2023-12-05 PROCEDURE — 99233 PR SUBSEQUENT HOSPITAL CARE,LEVL III: ICD-10-PCS | Mod: ,,, | Performed by: PSYCHIATRY & NEUROLOGY

## 2023-12-05 PROCEDURE — 99233 PR SUBSEQUENT HOSPITAL CARE,LEVL III: ICD-10-PCS | Mod: GC,,, | Performed by: PSYCHIATRY & NEUROLOGY

## 2023-12-05 PROCEDURE — 94761 N-INVAS EAR/PLS OXIMETRY MLT: CPT

## 2023-12-05 RX ORDER — HEPARIN SODIUM 5000 [USP'U]/ML
5000 INJECTION, SOLUTION INTRAVENOUS; SUBCUTANEOUS EVERY 8 HOURS
Status: DISCONTINUED | OUTPATIENT
Start: 2023-12-05 | End: 2023-12-05

## 2023-12-05 RX ORDER — AMOXICILLIN 250 MG
1 CAPSULE ORAL 2 TIMES DAILY
Status: DISCONTINUED | OUTPATIENT
Start: 2023-12-05 | End: 2023-12-15 | Stop reason: HOSPADM

## 2023-12-05 RX ORDER — AMOXICILLIN 250 MG
1 CAPSULE ORAL DAILY
Status: DISCONTINUED | OUTPATIENT
Start: 2023-12-05 | End: 2023-12-05

## 2023-12-05 RX ORDER — HEPARIN SODIUM 5000 [USP'U]/ML
5000 INJECTION, SOLUTION INTRAVENOUS; SUBCUTANEOUS EVERY 8 HOURS
Status: DISCONTINUED | OUTPATIENT
Start: 2023-12-05 | End: 2023-12-15 | Stop reason: HOSPADM

## 2023-12-05 RX ADMIN — MEMANTINE HYDROCHLORIDE 5 MG: 5 TABLET ORAL at 08:12

## 2023-12-05 RX ADMIN — SENNOSIDES AND DOCUSATE SODIUM 1 TABLET: 8.6; 5 TABLET ORAL at 08:12

## 2023-12-05 RX ADMIN — HYDRALAZINE HYDROCHLORIDE 25 MG: 25 TABLET, FILM COATED ORAL at 10:12

## 2023-12-05 RX ADMIN — LABETALOL HYDROCHLORIDE 10 MG: 5 INJECTION, SOLUTION INTRAVENOUS at 02:12

## 2023-12-05 RX ADMIN — HYDRALAZINE HYDROCHLORIDE 25 MG: 25 TABLET, FILM COATED ORAL at 06:12

## 2023-12-05 RX ADMIN — MEMANTINE HYDROCHLORIDE 5 MG: 5 TABLET ORAL at 09:12

## 2023-12-05 RX ADMIN — NICARDIPINE HYDROCHLORIDE 2.5 MG/HR: 0.2 INJECTION, SOLUTION INTRAVENOUS at 02:12

## 2023-12-05 RX ADMIN — IOHEXOL 75 ML: 350 INJECTION, SOLUTION INTRAVENOUS at 04:12

## 2023-12-05 RX ADMIN — SENNOSIDES AND DOCUSATE SODIUM 1 TABLET: 8.6; 5 TABLET ORAL at 11:12

## 2023-12-05 RX ADMIN — HEPARIN SODIUM 5000 UNITS: 5000 INJECTION INTRAVENOUS; SUBCUTANEOUS at 09:12

## 2023-12-05 RX ADMIN — MUPIROCIN: 20 OINTMENT TOPICAL at 09:12

## 2023-12-05 RX ADMIN — HYDRALAZINE HYDROCHLORIDE 25 MG: 25 TABLET, FILM COATED ORAL at 02:12

## 2023-12-05 RX ADMIN — MUPIROCIN: 20 OINTMENT TOPICAL at 08:12
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
## 2023-12-05 NOTE — PLAN OF CARE
"[facility] Care Plan    POC reviewed with Sheng Virgen and family at 0300. Pt verbalized understanding. Reinforcement required. Questions and concerns addressed. No acute events overnight. Pt progressing toward goals. Will continue to monitor. See below and flowsheets for full assessment and VS info.     -neuro exam unchanged   -CT completed  -Cardene intermittently on and off overnight. Labetalol effective decreases BP. PRNs given overnight. See MAR for details   -20 G PIV placed in Rt UA  -Bath given and linen changed      Is this a stroke patient? yes- Stroke booklet reviewed with patient, risk factors identified for patient and stroke booklet remains at bedside for ongoing education.     Neuro:  [place] Coma Scale  Best Eye Response: 4-->(E4) spontaneous  Best Motor Response: 6-->(M6) obeys commands  Best Verbal Response: 4-->(V4) confused  Hiral Coma Scale Score: 14  Assessment Qualifiers: patient not sedated/intubated, no eye obstruction present  Pupil PERRLA: yes     24hr Temp:  [97.4 °F (36.3 °C)-97.9 °F (36.6 °C)]     CV:   Rhythm: normal sinus rhythm  BP goals:   SBP < 160  MAP > 65    Resp:           Plan: N/A    GI/:     Diet/Nutrition Received: NPO  Last Bowel Movement: 12/02/23       Intake/Output Summary (Last 24 hours) at 12/5/2023 0629  Last data filed at 12/5/2023 0605  Gross per 24 hour   Intake 637.97 ml   Output 910 ml   Net -272.03 ml     Unmeasured Output  Stool Occurrence: 0    Labs/Accuchecks:  Recent Labs   Lab 12/05/23  0538   WBC 5.53   RBC 3.72*   HGB 11.4*   HCT 34.2*         Recent Labs   Lab 12/05/23  0538      K 3.7   CO2 30*      BUN 14   CREATININE 0.9   ALKPHOS 53*   ALT 13   AST 37   BILITOT 0.7      Recent Labs   Lab 12/04/23  0359 12/04/23  0654   INR 1.1  --    APTT  --  25.1    No results for input(s): "CPK", "CPKMB", "TROPONINI", "MB" in the last 168 hours.    Electrolytes: N/A - electrolytes WDL  Accuchecks: none    Gtts:   nicardipine Stopped (12/05/23 " 0530)       LDA/Wounds:  Lines/Drains/Airways       Drain  Duration             Male External Urinary Catheter 12/04/23 0700 Small <1 day              Peripheral Intravenous Line  Duration                  Peripheral IV - Single Lumen 12/03/23 2206 20 G Right Antecubital 1 day         Peripheral IV - Single Lumen 12/04/23 1611 20 G Anterior;Distal;Left Forearm <1 day                  Wounds: Yes  Wound care consulted: Yes

## 2023-12-05 NOTE — PROGRESS NOTES
Subjective:  Adm early this am for L cerebellar ich/ivh  Repeat imaging and exam unchanged    Objective:  Vital signs, lab studies, and imaging reviewed by me  Alert, oriented x2-3, cranial II-XII intact, sensation full, moves all extremities ag, no obvious dysmetria (unable to cooperate)  Warm and well perfused, regular rate and rhythm, no murmurs  No dependent edema  Breathing comfortably, breath sounds clear  Belly soft, nontender, no hepatosplenomegaly    Assessment/Plan:  84 yo adm w L cerebellar ich w small volume ivh  Unchanged exam and imaging on repeat  Continued hypertension on cardene    -sbp<160, adj po meds and wean nicardipine  -delirium precautions  -pt/ot, diet, oob    Critical condition in that Patient has a condition that poses threat to life and bodily function: as above     31 additional minutes of Critical care time was spent personally by me on the following activities: development of treatment plan with patient or surrogate and bedside caregivers, discussions with consultants, evaluation of patient's response to treatment, examination of patient, ordering and performing treatments and interventions, ordering and review of laboratory studies, ordering and review of radiographic studies, pulse oximetry, antibiotic titration if applicable, vasopressor titration if applicable, re-evaluation of patient's condition. This critical care time did not overlap with that of any other provider or involve time for any procedures. There is high probability for acute neurological change leading to clinical and possibly life-threatening deterioration requiring highest level of physician preparedness for urgent intervention.

## 2023-12-05 NOTE — PLAN OF CARE
"[facility] Care Plan    POC reviewed with Sheng Virgen and family at 1400. Pt has dementia at baseline and did not demonstrate understanding. Pt's family verbalized understanding. Questions and concerns addressed. No acute events today. Pt progressing toward goals. Will continue to monitor. See below and flowsheets for full assessment and VS info.     - CT scan completed   - Pt passed [place]  - Seen by PT/OT  - SLP attempted eval, pt drowsy  - Bilateral mitts in place  - Cardene gtt titrated to maintain SBP <160    Is this a stroke patient? yes- Stroke booklet reviewed with patient and family, risk factors identified for patient and stroke booklet remains at bedside for ongoing education.     Neuro:  [place] Coma Scale  Best Eye Response: 3-->(E3) to speech  Best Motor Response: 6-->(M6) obeys commands  Best Verbal Response: 4-->(V4) confused  [place] Coma Scale Score: 13  Assessment Qualifiers: patient not sedated/intubated, no eye obstruction present  Pupil PERRLA: yes     24 hr Temp:  [96.4 °F (35.8 °C)-97.7 °F (36.5 °C)]     CV:   Rhythm: normal sinus rhythm  BP goals:   SBP < 160  MAP > 65    Resp:           Plan: N/A    GI/:     Diet/Nutrition Received: NPO  Last Bowel Movement: 12/02/23       Intake/Output Summary (Last 24 hours) at 12/4/2023 1943  Last data filed at 12/4/2023 1701  Gross per 24 hour   Intake 934.67 ml   Output 610 ml   Net 324.67 ml          Labs/Accuchecks:  Recent Labs   Lab 12/04/23 0359   WBC 5.69   RBC 3.58*   HGB 10.9*   HCT 31.4*   *      Recent Labs   Lab 12/04/23  0359 12/04/23  1220     --    K 2.4* 3.1*   CO2 32*  --      --    BUN 17  --    CREATININE 0.8  --    ALKPHOS 49*  --    ALT 10  --    AST 36  --    BILITOT 0.8  --       Recent Labs   Lab 12/04/23  0359 12/04/23  0654   INR 1.1  --    APTT  --  25.1    No results for input(s): "CPK", "CPKMB", "TROPONINI", "MB" in the last 168 hours.    Electrolytes: Electrolytes replaced  Accuchecks: none    Gtts:   " nicardipine 2.5 mg/hr (12/04/23 1701)       LDA/Wounds:  Lines/Drains/Airways       Drain  Duration             Male External Urinary Catheter 12/04/23 0700 Small <1 day              Peripheral Intravenous Line  Duration                  Peripheral IV - Single Lumen 12/03/23 2206 20 G Right Antecubital <1 day         Peripheral IV - Single Lumen 12/04/23 1611 20 G Anterior;Distal;Left Forearm <1 day                  Wounds: Yes  Wound care consulted: No

## 2023-12-05 NOTE — SUBJECTIVE & OBJECTIVE
Neurologic Chief Complaint: ICH    Subjective:     Interval History: Patient is seen for follow-up neurological assessment and treatment recommendations: exam much improved today; patient able to move all four extremities, and occasionally communicate clearly, though is still confused; not very cooperative with exam; off Cardene as of 5:30AM, received prn labetalol around 2AM this morning; on oral hydralazine; CTA done this morning and is without evidence of underlying vascular lesion; echo with normal size of left and right atrium, EF 60-65%; follow-up MRI is ordered.    HPI, Past Medical, Family, and Social History remains the same as documented in the initial encounter.     Review of Systems   Unable to perform ROS: Dementia     Scheduled Meds:   heparin (porcine)  5,000 Units Subcutaneous Q8H    hydrALAZINE  25 mg Oral Q8H    memantine  5 mg Oral BID    mupirocin   Nasal BID    senna-docusate 8.6-50 mg  1 tablet Oral BID     Continuous Infusions:   nicardipine Stopped (12/05/23 0530)     PRN Meds:calcium gluconate IVPB, calcium gluconate IVPB, calcium gluconate IVPB, labetalol, magnesium sulfate IVPB, magnesium sulfate IVPB, ondansetron, potassium chloride **AND** potassium chloride **AND** potassium chloride, sodium chloride 0.9%    Objective:     Vital Signs (Most Recent):  Temp: 97.8 °F (36.6 °C) (12/05/23 1101)  Pulse: 74 (12/05/23 1301)  Resp: 10 (12/05/23 1301)  BP: 134/85 (12/05/23 1418)  SpO2: 100 % (12/05/23 1301)  BP Location: Right arm    Vital Signs Range (Last 24H):  Temp:  [97.4 °F (36.3 °C)-97.9 °F (36.6 °C)]   Pulse:  []   Resp:  [8-56]   BP: (124-224)/()   SpO2:  [92 %-100 %]   BP Location: Right arm       Physical Exam  Vitals and nursing note reviewed.   Constitutional:       Appearance: He is ill-appearing.   HENT:      Head: Normocephalic and atraumatic.      Mouth/Throat:      Mouth: Mucous membranes are dry.   Cardiovascular:      Rate and Rhythm: Normal rate and regular  rhythm.   Pulmonary:      Effort: Pulmonary effort is normal.   Abdominal:      Palpations: Abdomen is soft.   Musculoskeletal:      Right lower leg: No edema.      Left lower leg: No edema.   Skin:     General: Skin is dry.   Neurological:      Mental Status: He is alert.              Neurological Exam:   LOC: alert  Attention Span: poor  Language: Naming impaired  Articulation: No dysarthria  Orientation: Not oriented to place, Not oriented to time  Visual Fields: limited by patient being uncooperative with exam; does not appear to blink to threat when tested  EOM (CN III, IV, VI): Full/intact  Pupils (CN II, III): PERRL  Facial Sensation (CN V): Normal  Facial Movement (CN VII): Symmetric facial expression    Motor: Arm left  Paresis: 4/5  Leg left  Paresis: 4/5  Arm right  Paresis: 4/5  Leg right Paresis: 4/5  Cerebellum: difficult to test due to patient's lack of cooperativeness with exam  Sensation: Intact to light touch, temperature and vibration  Withdraws to pain throughout all four extremities    Laboratory:  BMP:   Recent Labs   Lab 12/05/23  0538      K 3.7      CO2 30*   BUN 14   CREATININE 0.9   CALCIUM 9.2     CBC:   Recent Labs   Lab 12/05/23  0538   WBC 5.53   RBC 3.72*   HGB 11.4*   HCT 34.2*      MCV 92   MCH 30.6   MCHC 33.3     Lipid Panel:   Recent Labs   Lab 12/04/23  0359   CHOL 175   LDLCALC 105.8   HDL 58   TRIG 56     Hgb A1C:   Recent Labs   Lab 12/04/23  0359   HGBA1C 4.8     TSH:   Recent Labs   Lab 12/04/23  0359   TSH 5.463*       Diagnostic Results     Brain Imaging   CT Head 12/3/23  1. Left cerebellar intraparenchymal hemorrhage with intraventricular extension into the 4th ventricle.  2. No acute fracture or traumatic dislocation of the cervical spine.  3. Paranasal sinus disease.  4. Degenerative changes of the cervical spine most pronounced at C5-C6 with moderate spinal canal stenosis and severe bilateral neural foraminal narrowing.    CT Head 12/4/23:  4:01  Redemonstration of left cerebellar hemorrhage, unchanged in size and configuration from prior exam with continued interventricular extension.  No significant interval detrimental change compared to prior head CT.     CT Head 12/4/23: 13:47  Similar left cerebellar intraparenchymal hemorrhage with extension into the 4th ventricle.       Vessel Imaging   CTA Head 12/5/23  Stable left cerebellar hemorrhage, with mild extension into the 4th ventricle. Scattered mild intracranial atherosclerosis with no evidence of aneurysm, significant stenosis, or occlusion. Atherosclerosis about the visualized extracranial vasculature, noting potential focal ulceration about the distal left cervical internal carotid artery.    Cardiac Imaging   Echo 12/5/23    Left Ventricle: The left ventricle is normal in size. Normal wall thickness. There is concentric remodeling. Normal wall motion. There is normal systolic function with a visually estimated ejection fraction of 60 - 65%. Diastolic function cannot be reliably determined in the presence of mitral valve disease.    Right Ventricle: Normal right ventricular cavity size. Wall thickness is normal. Right ventricle wall motion  is normal. Systolic function is normal.    Aortic Valve: The aortic valve is a trileaflet valve. There is mild aortic valve sclerosis. There is annular calcification present. There is mild aortic regurgitation.    Mitral Valve: There is moderate mitral annular calcification present. There is normal leaflet mobility. There is systolic anterior motion of the mitral valve. There is moderate stenosis. The mean pressure gradient across the mitral valve is 8 mmHg at a heart rate of 96 bpm. There is no significant regurgitation.    Tricuspid Valve: There is mild regurgitation.    Pulmonary Artery: The estimated pulmonary artery systolic pressure is 36 mmHg.    IVC/SVC: Normal venous pressure at 3 mmHg.    Pericardium: Left pleural effusion.
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
## 2023-12-05 NOTE — ASSESSMENT & PLAN NOTE
Carries history of dementia. Oriented to self only at baseline per daughter. Prescribed Aricept and Namenda previously, though is unclear what medications, if any, he was taking at home. Per daughters, is close to cognitive baseline now.

## 2023-12-05 NOTE — ASSESSMENT & PLAN NOTE
-Stroke risk factor.  SBP<160.  -Off Cardene  -On oral hydralazine w/PRN labetalol  -Per discussion with family, was not taking any antihypertensives at home

## 2023-12-05 NOTE — ASSESSMENT & PLAN NOTE
-Due to stroke and noted on imaging.  4th ventricle compression specifically noted.    -The patient is admitted to [facility] for close monitoring, hourly neuro checks.  -Neurosurgery consulted by primary team  -Likely etiology is hypertension  -Repeat CTH stable

## 2023-12-05 NOTE — PLAN OF CARE
Moist and Minced diet (level 5) recommended with thin liquids  Problem: SLP  Goal: SLP Goal  Description: Goals due 12/12  1.  Tolerate [facility] soft diet - level 5 with no s/s of aspiration  2.  Participate in speech langauge cognitive eval  Outcome: Ongoing, Progressing

## 2023-12-05 NOTE — NURSING
Pt arrived back to room following CT        Pt was escorted by RN on cardiac monitoring, ambu bag, and IV pole.        Patient placed back on bedside monitor with alarms audible, bed in low position with bed alarm on, call light within reach. Monitoring continued.

## 2023-12-05 NOTE — PLAN OF CARE
LOCET was completed.        Herman Adan [facility]  Case Management  [phone]

## 2023-12-05 NOTE — HOSPITAL COURSE
12/05/2023 No acute events overnight, examination stable on delirium precautions, CTH stable this AM and CTA without vascular anomalies. MRI pending for CAA evaluation, weaning nicardipine.  12/6/2023: Slightly more alert today, unable to obtain MRI overnight no other major changes, dispo planning ongoing see case management notes.

## 2023-12-05 NOTE — PROGRESS NOTES
[facility] - Neuro Critical Care  Neurosurgery  Progress Note    Subjective:     History of Present Illness: 83M h/o dementia presents with increased confusion and decreased mental status. Per family  He has become quite confused and out of it over the last 2 days.  They reported he fell out of his chair yesterday and  He has been confused.   C Th in ED revealed Cerebellar ich with ivh into 4th ventricle. He is being admitted to [facility] for a higher level of care. No AC/AP.    Post-Op Info:  * No surgery found *       Interval History: 12/05/2023 NAEON. Afebrile. Neuro exam stable.     Medications:  Continuous Infusions:   nicardipine Stopped (12/05/23 0530)     Scheduled Meds:   hydrALAZINE  25 mg Oral Q8H    memantine  5 mg Oral BID    mupirocin   Nasal BID    senna-docusate 8.6-50 mg  1 tablet Oral BID     PRN Meds:calcium gluconate IVPB, calcium gluconate IVPB, calcium gluconate IVPB, labetalol, magnesium sulfate IVPB, magnesium sulfate IVPB, ondansetron, potassium chloride **AND** potassium chloride **AND** potassium chloride, sodium chloride 0.9%     Review of Systems  Objective:     Weight: 64.4 kg (142 lb)  Body mass index is 19.26 kg/m².  Vital Signs (Most Recent):  Temp: 97.8 °F (36.6 °C) (12/05/23 1101)  Pulse: 74 (12/05/23 1301)  Resp: 10 (12/05/23 1301)  BP: 124/72 (12/05/23 1301)  SpO2: 100 % (12/05/23 1301) Vital Signs (24h Range):  Temp:  [97.4 °F (36.3 °C)-97.9 °F (36.6 °C)] 97.8 °F (36.6 °C)  Pulse:  [] 74  Resp:  [8-68] 10  SpO2:  [92 %-100 %] 100 %  BP: (104-224)/() 124/72                         Male External Urinary Catheter 12/04/23 0700 Small (Active)   Collection Container Urimeter 12/05/23 1101   Securement Method secured to top of thigh w/ adhesive device 12/05/23 1101   Skin no redness;no breakdown 12/05/23 1101   Tolerance no signs/symptoms of discomfort 12/05/23 1101   Output (mL) 100 mL 12/05/23 0305   Catheter Change Date 12/05/23 12/05/23 1101   Catheter Change Time 0500  12/05/23 0505          Physical Exam   [id number]  AO x 1  Bsi  Fc x 4 ag  SILT     Neurosurgery Physical Exam    Significant Labs:  Recent Labs   Lab 12/03/23 2141 12/04/23  0359 12/04/23  1220 12/05/23  0538   GLU 89 67*  --  68*    145  --  142   K 3.1* 2.4* 3.1* 3.7    101  --  102   CO2 30* 32*  --  30*   BUN 16 17  --  14   CREATININE 0.9 0.8  --  0.9   CALCIUM 9.4 9.0  --  9.2   MG  --   --   --  2.1     Recent Labs   Lab 12/03/23 2141 12/04/23  0359 12/05/23  0538   WBC 7.34 5.69 5.53   HGB 12.0* 10.9* 11.4*   HCT 35.8* 31.4* 34.2*    149* 190     Recent Labs   Lab 12/04/23 0347 12/04/23  0359 12/04/23  0654   INR 1.1 1.1  --    APTT 25.0  --  25.1     Microbiology Results (last 7 days)       Procedure Component Value Units Date/Time    MRSA/SA Rapid ID by PCR from Blood culture [[id number]] Collected: 12/03/23 2140    Order Status: Completed Updated: 12/05/23 1039     Staph aureus ID by PCR Negative     Methicillin Resistant ID by PCR Negative    Blood Culture #2 **CANNOT BE ORDERED STAT** [[id number]] Collected: 12/03/23 2140    Order Status: Completed Specimen: Blood from Peripheral, Hand, Right Updated: 12/05/23 0846     Blood Culture, Routine Gram stain aer bottle: Gram positive cocci in clusters resembling Staph      Results called to and read back by: Sunshine Saeed RN 12/05/2023      08:44    Blood Culture #1 **CANNOT BE ORDERED STAT** [[id number]] Collected: 12/03/23 2140    Order Status: Completed Specimen: Blood from Peripheral, Forearm, Right Updated: 12/04/23 2312     Blood Culture, Routine No Growth to date      No Growth to date          Recent Lab Results         12/05/23  0538   12/04/23  1815        Albumin 3.3         ALP 53         ALT 13         Anion Gap 10         Ascending aorta   3.17       Ao peak alysa   1.71       Ao VTI   26.66       AST 37         AV valve area   2.92       KELSEY by Velocity Ratio   2.49       AV mean gradient   8       AV index (prosthetic)    0.84       AV peak gradient   12       AV Velocity Ratio   0.72       Baso # 0.03         Basophil % 0.5         BILIRUBIN TOTAL 0.7  Comment: For infants and newborns, interpretation of results should be based  on gestational age, weight and in agreement with clinical  observations.    Premature Infant recommended reference ranges:  Up to 24 hours.............<8.0 mg/dL  Up to 48 hours............<12.0 mg/dL  3-5 days..................<15.0 mg/dL  6-29 days.................<15.0 mg/dL           BSA   1.81       BUN 14         Calcium 9.2         Chloride 102         CO2 30         Creatinine 0.9         Left Ventricle Relative Wall Thickness   0.77       Differential Method Automated         E/A ratio   0.57       E/E' ratio   15.25       eGFR >60.0         Eos # 0.1         Eosinophil % 1.1         E wave deceleration time   149.68       FS   41       Glucose 68         Gran # (ANC) 3.6         Gran % 65.1         Hematocrit 34.2         Hemoglobin 11.4         Mitral Valve Heart Rate   96       Immature Grans (Abs) 0.02  Comment: Mild elevation in immature granulocytes is non specific and   can be seen in a variety of conditions including stress response,   acute inflammation, trauma and pregnancy. Correlation with other   laboratory and clinical findings is essential.           Immature Granulocytes 0.4         IVRT   95.15       IVSd   1.22       LA WIDTH   3.53       Left Atrium Major Axis   5.13       Left Atrium Minor Axis   5.91       LA size   3.37       LA volume   55.54          62.84       LA vol index   30.2       LA Volume Index (Mod)   34.2       LVOT area   3.5       LV LATERAL E/E' RATIO   20.33       LV SEPTAL E/E' RATIO   12.20       LV EDV BP   32.81       LV Diastolic Volume Index   17.83       LVIDd   2.92       LVIDs   1.73       LV mass   101.66       LV Mass Index   55       LVOT diameter   2.10       LVOT peak alysa   1.23       LVOT stroke volume   77.89       LVOT peak VTI   22.50       LV  ESV BP   8.73       LV Systolic Volume Index   4.7       Lymph # 1.5         Lymph % 27.3         Magnesium  2.1         MCH 30.6         MCHC 33.3         MCV 92         Mean e'   0.08       Mono # 0.3         Mono % 5.6         MPV 9.0         MV valve area p 1/2 method   5.07       MV mean gradient   8       MV peak gradient   17       MV Peak A Scott   2.14       MV Peak E Scott   1.22       MV stenosis pressure 1/2 time   43.41       nRBC 0         Phosphorus Level 2.8         Platelet Count 190         Potassium 3.7         PROTEIN TOTAL 7.8         Posterior Wall   1.13       RA Major Axis   4.73       Est. RA pres   3       RA Width   3.51       RBC 3.72         RDW 16.2         RV TB RVSP   6       RVDD   3.00       Sinus   3.8       Sodium 142         STJ   3.28       TAPSE   2.06       TDI SEPTAL   0.10       TDI LATERAL   0.06       Triscuspid Valve Regurgitation Peak Gradient   33       TR Max Scott   2.88       TV resting pulmonary artery pressure   36       WBC 5.53         ZLVIDD   -5.59       ZLVIDS   -4.74               Significant Diagnostics:  I have reviewed and interpreted all pertinent imaging results/findings within the past 24 hours.  Assessment/Plan:     * 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  83M h/o dementia presents with increased confusion and decreased mental status. Per family  He has become quite confused and out of it over the last 2 days.  They reported he fell out of his chair yesterday and  He has been confused.   C Th in ED revealed Cerebellar ich with ivh into 4th ventricle. He is being admitted to [facility] for a higher level of care. No AC/AP.    ICHS 2    --Patient admitted to ICU on telemetry      -q1h neurochecks in ICU, q2h neurochecks in stepdown, q4h neurochecks on floor  --All labs and diagnostics reviewed  --Follow-up CTH and 6h scan for stability      -repeat imaging stable  --Hold anti-plt/coag medications  --SBP <140 (cardene ggt; hydralazine &  labetalol PRN; transition to home meds when appropriate)  --Na >135  --HOB >30  --No acute neurosurgical intervention warranted. Neurosurgery will sign off. Please contact neurosurgery on call with any questions.    Dispo: per primary    Plan d/w Dr. Olson.        Hussain Simms MD  Neurosurgery  [facility] - Neuro Critical Care

## 2023-12-05 NOTE — ASSESSMENT & PLAN NOTE
83M h/o dementia presents with increased confusion and decreased mental status. Per family  He has become quite confused and out of it over the last 2 days.  They reported he fell out of his chair yesterday and  He has been confused.   C Th in ED revealed Cerebellar ich with ivh into 4th ventricle. He is being admitted to [facility] for a higher level of care. No AC/AP.    ICHS 2    --Patient admitted to ICU on telemetry      -q1h neurochecks in ICU, q2h neurochecks in stepdown, q4h neurochecks on floor  --All labs and diagnostics reviewed  --Follow-up CTH and 6h scan for stability      -repeat imaging stable  --Hold anti-plt/coag medications  --SBP <140 (cardene ggt; hydralazine & labetalol PRN; transition to home meds when appropriate)  --Na >135  --HOB >30  --No acute neurosurgical intervention warranted. Neurosurgery will sign off. Please contact neurosurgery on call with any questions.    Dispo: per primary    Plan d/w Dr. Olson.

## 2023-12-05 NOTE — PLAN OF CARE
12/05/23 1513   Post-Acute Status   Post-Acute Authorization Placement   Post-Acute Placement Status Referrals Sent   Patient choice form signed by patient/caregiver List from [facility]  ([employer] List)   Discharge Plan   Discharge Plan A Skilled Nursing Facility   Discharge Plan B Skilled Nursing Facility     CM informed by Dr. Alonso that the patient's daughters are at beside and want to discuss patient's care.      Per daughters, daughters feel that the patient's son is not caring for the patient properly and stated that they have filed several EPS reports.  Per daughters, they do not want the patient's son involved in his care and daughters stated that Donya Reyes [phone] , listed as a daughter in law is just a girlfriend of patient's son.  Per daughters, there is one more daughter that is coming to [facility] from out of town.  Daughters stated that no one has POA for the patient at this time.  Daughters stated that they want the best care for their father.     PASSR completed, Locet Conpleted.  Awaiting 142    Met with daughters, Gerard Virgen [phone] and Alisson Virgen [phone] at bedside to review discharge recommendation of SNF and is agreeable to plan    Patient/family provided list of facilities in-network with patient's payor plan. Providers that are owned, operated, or affiliated with [facility] are included on the list.     Notified that referral sent to below listed facilities from in-network list based on proximity to home/family support:   [facility]  2.    [facility]  3.  4.  5. (can send more than 5)    Patient/family instructed to identify preference.    Preferred Facility: (if more than 1, listed in order of descending preference)  [facility]     If an additional preferred facility not listed above is identified, additional referral to be sent. If above facilities unable to accept, will send additional referrals to in-network providers. .    Discharge Plan A and  Plan B have been determined by review of patient's clinical status, future medical and therapeutic needs, and coverage/benefits for post-acute care in coordination with multidisciplinary team members.    Lou Reveles RN, CCRN-K, [facility]  Neuro-Critical Care   [phone]

## 2023-12-05 NOTE — PT/OT/SLP EVAL
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 male with an SLP diagnosis of Dysphagia.    History:     Past Medical History:   Diagnosis Date    AR (allergic rhinitis)     Chronic hepatitis C without hepatic coma 8/1/2023    Hypertension     hereditary    Memory loss     Prostate cancer        History reviewed. No pertinent surgical history.    Social History: Patient lives with [facility].    Prior Intubation HX:  na    Modified Barium Swallow: na        Prior diet: malia    Occupation/hobbies/homemaking: [employer].    Subjective     No family present  Patient goals: did not state     Pain/Comfort:  Pain Rating 1: 0/10  Pain Rating Post-Intervention 1: 0/10    Respiratory Status: Room air    Objective:     Oral Musculature Evaluation  Oral Musculature: WFL  Dentition: scattered dentition  Secretion Management: adequate  Mucosal Quality: good  Mandibular Strength and Mobility: WFL  Oral Labial Strength and Mobility: WFL  Lingual Strength and Mobility: WFL  Velar Elevation: WFL  Buccal Strength and Mobility: WFL  Volitional Cough: not elicited  Volitional Swallow: not elicited  Voice Prior to PO Intake: wfl    Bedside Swallow Eval:   Consistencies Assessed:  Thin liquids 2 teaspoons then self fed 4 ounces of water via cup with a straw  Puree 2 teaspoons  Solids 1/3 cracker      Oral Phase:   WFL    Pharyngeal Phase:    no overt clinical signs/symptoms of aspiration  no overt clinical signs/symptoms of pharyngeal dysphagia    Compensatory Strategies  None    Treatment: Vicki presented with decreased level of alertness.  He followed simple commands and verbalized in response to questions however eyes were closed for most of session.      Goals:   Multidisciplinary Problems       SLP Goals          Problem: SLP    Goal Priority Disciplines Outcome   SLP Goal     SLP Ongoing, Progressing   Description: Goals due 12/12  1.  Tolerate [facility] soft diet - level 5 with no s/s of aspiration  2.  Participate in speech langauge cognitive eval                       Plan:     Patient to be seen:  5 x/week   Plan of Care expires:  01/02/24  Plan of Care reviewed with:  patient   SLP Follow-Up:  Yes       Discharge recommendations:  Moderate Intensity Therapy   Barriers to Discharge:  Decreased Care Giver Support    Time Tracking:     SLP Treatment Date:   12/05/23  Speech Start Time:  0850  Speech Stop Time:  0900     Speech Total Time (min):  10 min    Billable Minutes: Eval Swallow and Oral Function 10    12/05/2023

## 2023-12-05 NOTE — SUBJECTIVE & OBJECTIVE
Interval History: 12/05/2023 NAEON. Afebrile. Neuro exam stable.     Medications:  Continuous Infusions:   nicardipine Stopped (12/05/23 0530)     Scheduled Meds:   hydrALAZINE  25 mg Oral Q8H    memantine  5 mg Oral BID    mupirocin   Nasal BID    senna-docusate 8.6-50 mg  1 tablet Oral BID     PRN Meds:calcium gluconate IVPB, calcium gluconate IVPB, calcium gluconate IVPB, labetalol, magnesium sulfate IVPB, magnesium sulfate IVPB, ondansetron, potassium chloride **AND** potassium chloride **AND** potassium chloride, sodium chloride 0.9%     Review of Systems  Objective:     Weight: 64.4 kg (142 lb)  Body mass index is 19.26 kg/m².  Vital Signs (Most Recent):  Temp: 97.8 °F (36.6 °C) (12/05/23 1101)  Pulse: 74 (12/05/23 1301)  Resp: 10 (12/05/23 1301)  BP: 124/72 (12/05/23 1301)  SpO2: 100 % (12/05/23 1301) Vital Signs (24h Range):  Temp:  [97.4 °F (36.3 °C)-97.9 °F (36.6 °C)] 97.8 °F (36.6 °C)  Pulse:  [] 74  Resp:  [8-68] 10  SpO2:  [92 %-100 %] 100 %  BP: (104-224)/() 124/72                         Male External Urinary Catheter 12/04/23 0700 Small (Active)   Collection Container Urimeter 12/05/23 1101   Securement Method secured to top of thigh w/ adhesive device 12/05/23 1101   Skin no redness;no breakdown 12/05/23 1101   Tolerance no signs/symptoms of discomfort 12/05/23 1101   Output (mL) 100 mL 12/05/23 0305   Catheter Change Date 12/05/23 12/05/23 1101   Catheter Change Time 0500 12/05/23 0505          Physical Exam   [id number]  AO x 1  Bsi  Fc x 4 ag  SILT     Neurosurgery Physical Exam    Significant Labs:  Recent Labs   Lab 12/03/23  2141 12/04/23  0359 12/04/23  1220 12/05/23  0538   GLU 89 67*  --  68*    145  --  142   K 3.1* 2.4* 3.1* 3.7    101  --  102   CO2 30* 32*  --  30*   BUN 16 17  --  14   CREATININE 0.9 0.8  --  0.9   CALCIUM 9.4 9.0  --  9.2   MG  --   --   --  2.1     Recent Labs   Lab 12/03/23  2141 12/04/23  0359 12/05/23  0538   WBC 7.34 5.69 5.53   HGB 12.0*  10.9* 11.4*   HCT 35.8* 31.4* 34.2*    149* 190     Recent Labs   Lab 12/04/23  0347 12/04/23  0359 12/04/23  0654   INR 1.1 1.1  --    APTT 25.0  --  25.1     Microbiology Results (last 7 days)       Procedure Component Value Units Date/Time    MRSA/SA Rapid ID by PCR from Blood culture [[id number]] Collected: 12/03/23 2140    Order Status: Completed Updated: 12/05/23 1039     Staph aureus ID by PCR Negative     Methicillin Resistant ID by PCR Negative    Blood Culture #2 **CANNOT BE ORDERED STAT** [[id number]] Collected: 12/03/23 2140    Order Status: Completed Specimen: Blood from Peripheral, Hand, Right Updated: 12/05/23 0846     Blood Culture, Routine Gram stain aer bottle: Gram positive cocci in clusters resembling Staph      Results called to and read back by: Sunshine Saeed RN 12/05/2023      08:44    Blood Culture #1 **CANNOT BE ORDERED STAT** [[id number]] Collected: 12/03/23 2140    Order Status: Completed Specimen: Blood from Peripheral, Forearm, Right Updated: 12/04/23 2312     Blood Culture, Routine No Growth to date      No Growth to date          Recent Lab Results         12/05/23  0538   12/04/23  1815        Albumin 3.3         ALP 53         ALT 13         Anion Gap 10         Ascending aorta   3.17       Ao peak alysa   1.71       Ao VTI   26.66       AST 37         AV valve area   2.92       KELSEY by Velocity Ratio   2.49       AV mean gradient   8       AV index (prosthetic)   0.84       AV peak gradient   12       AV Velocity Ratio   0.72       Baso # 0.03         Basophil % 0.5         BILIRUBIN TOTAL 0.7  Comment: For infants and newborns, interpretation of results should be based  on gestational age, weight and in agreement with clinical  observations.    Premature Infant recommended reference ranges:  Up to 24 hours.............<8.0 mg/dL  Up to 48 hours............<12.0 mg/dL  3-5 days..................<15.0 mg/dL  6-29 days.................<15.0 mg/dL           BSA   1.81       BUN  14         Calcium 9.2         Chloride 102         CO2 30         Creatinine 0.9         Left Ventricle Relative Wall Thickness   0.77       Differential Method Automated         E/A ratio   0.57       E/E' ratio   15.25       eGFR >60.0         Eos # 0.1         Eosinophil % 1.1         E wave deceleration time   149.68       FS   41       Glucose 68         Gran # (ANC) 3.6         Gran % 65.1         Hematocrit 34.2         Hemoglobin 11.4         Mitral Valve Heart Rate   96       Immature Grans (Abs) 0.02  Comment: Mild elevation in immature granulocytes is non specific and   can be seen in a variety of conditions including stress response,   acute inflammation, trauma and pregnancy. Correlation with other   laboratory and clinical findings is essential.           Immature Granulocytes 0.4         IVRT   95.15       IVSd   1.22       LA WIDTH   3.53       Left Atrium Major Axis   5.13       Left Atrium Minor Axis   5.91       LA size   3.37       LA volume   55.54          62.84       LA vol index   30.2       LA Volume Index (Mod)   34.2       LVOT area   3.5       LV LATERAL E/E' RATIO   20.33       LV SEPTAL E/E' RATIO   12.20       LV EDV BP   32.81       LV Diastolic Volume Index   17.83       LVIDd   2.92       LVIDs   1.73       LV mass   101.66       LV Mass Index   55       LVOT diameter   2.10       LVOT peak scott   1.23       LVOT stroke volume   77.89       LVOT peak VTI   22.50       LV ESV BP   8.73       LV Systolic Volume Index   4.7       Lymph # 1.5         Lymph % 27.3         Magnesium  2.1         MCH 30.6         MCHC 33.3         MCV 92         Mean e'   0.08       Mono # 0.3         Mono % 5.6         MPV 9.0         MV valve area p 1/2 method   5.07       MV mean gradient   8       MV peak gradient   17       MV Peak A Scott   2.14       MV Peak E Scott   1.22       MV stenosis pressure 1/2 time   43.41       nRBC 0         Phosphorus Level 2.8         Platelet Count 190         Potassium  3.7         PROTEIN TOTAL 7.8         Posterior Wall   1.13       RA Major Axis   4.73       Est. RA pres   3       RA Width   3.51       RBC 3.72         RDW 16.2         RV TB RVSP   6       RVDD   3.00       Sinus   3.8       Sodium 142         STJ   3.28       TAPSE   2.06       TDI SEPTAL   0.10       TDI LATERAL   0.06       Triscuspid Valve Regurgitation Peak Gradient   33       TR Max Scott   2.88       TV resting pulmonary artery pressure   36       WBC 5.53         ZLVIDD   -5.59       ZLVIDS   -4.74               Significant Diagnostics:  I have reviewed and interpreted all pertinent imaging results/findings within the past 24 hours.

## 2023-12-06 PROBLEM — E87.6 HYPOKALEMIA: Status: ACTIVE | Noted: 2023-12-06

## 2023-12-06 LAB
ALBUMIN SERPL BCP-MCNC: 2.8 G/DL (ref 3.5–5.2)
ALP SERPL-CCNC: 85 U/L (ref 55–135)
ALT SERPL W/O P-5'-P-CCNC: 13 U/L (ref 10–44)
ANION GAP SERPL CALC-SCNC: 12 MMOL/L (ref 8–16)
AST SERPL-CCNC: 33 U/L (ref 10–40)
BASOPHILS # BLD AUTO: 0.01 K/UL (ref 0–0.2)
BASOPHILS NFR BLD: 0.2 % (ref 0–1.9)
BILIRUB SERPL-MCNC: 0.3 MG/DL (ref 0.1–1)
BUN SERPL-MCNC: 27 MG/DL (ref 8–23)
CALCIUM SERPL-MCNC: 8.9 MG/DL (ref 8.7–10.5)
CHLORIDE SERPL-SCNC: 100 MMOL/L (ref 95–110)
CO2 SERPL-SCNC: 30 MMOL/L (ref 23–29)
CREAT SERPL-MCNC: 1.2 MG/DL (ref 0.5–1.4)
DIFFERENTIAL METHOD: ABNORMAL
EOSINOPHIL # BLD AUTO: 0 K/UL (ref 0–0.5)
EOSINOPHIL NFR BLD: 0.4 % (ref 0–8)
ERYTHROCYTE [DISTWIDTH] IN BLOOD BY AUTOMATED COUNT: 16 % (ref 11.5–14.5)
EST. GFR  (NO RACE VARIABLE): >60 ML/MIN/1.73 M^2
GLUCOSE SERPL-MCNC: 91 MG/DL (ref 70–110)
HCT VFR BLD AUTO: 30.9 % (ref 40–54)
HGB BLD-MCNC: 10.2 G/DL (ref 14–18)
IMM GRANULOCYTES # BLD AUTO: 0.01 K/UL (ref 0–0.04)
IMM GRANULOCYTES NFR BLD AUTO: 0.2 % (ref 0–0.5)
LYMPHOCYTES # BLD AUTO: 1.7 K/UL (ref 1–4.8)
LYMPHOCYTES NFR BLD: 29.9 % (ref 18–48)
MAGNESIUM SERPL-MCNC: 2 MG/DL (ref 1.6–2.6)
MCH RBC QN AUTO: 30.5 PG (ref 27–31)
MCHC RBC AUTO-ENTMCNC: 33 G/DL (ref 32–36)
MCV RBC AUTO: 93 FL (ref 82–98)
MONOCYTES # BLD AUTO: 0.4 K/UL (ref 0.3–1)
MONOCYTES NFR BLD: 7.4 % (ref 4–15)
NEUTROPHILS # BLD AUTO: 3.4 K/UL (ref 1.8–7.7)
NEUTROPHILS NFR BLD: 61.9 % (ref 38–73)
NRBC BLD-RTO: 0 /100 WBC
PHOSPHATE SERPL-MCNC: 2.8 MG/DL (ref 2.7–4.5)
PLATELET # BLD AUTO: 165 K/UL (ref 150–450)
PMV BLD AUTO: 9.9 FL (ref 9.2–12.9)
POTASSIUM SERPL-SCNC: 3.4 MMOL/L (ref 3.5–5.1)
PROT SERPL-MCNC: 6.9 G/DL (ref 6–8.4)
RBC # BLD AUTO: 3.34 M/UL (ref 4.6–6.2)
SODIUM SERPL-SCNC: 142 MMOL/L (ref 136–145)
WBC # BLD AUTO: 5.52 K/UL (ref 3.9–12.7)

## 2023-12-06 PROCEDURE — 83735 ASSAY OF MAGNESIUM: CPT | Performed by: NURSE PRACTITIONER

## 2023-12-06 PROCEDURE — 99233 SBSQ HOSP IP/OBS HIGH 50: CPT | Mod: GC,,, | Performed by: PSYCHIATRY & NEUROLOGY

## 2023-12-06 PROCEDURE — 11000001 HC ACUTE MED/SURG PRIVATE ROOM

## 2023-12-06 PROCEDURE — 99233 PR SUBSEQUENT HOSPITAL CARE,LEVL III: ICD-10-PCS | Mod: GC,,, | Performed by: PSYCHIATRY & NEUROLOGY

## 2023-12-06 PROCEDURE — 25000003 PHARM REV CODE 250

## 2023-12-06 PROCEDURE — 25000003 PHARM REV CODE 250: Performed by: PHYSICIAN ASSISTANT

## 2023-12-06 PROCEDURE — 92523 SPEECH SOUND LANG COMPREHEN: CPT

## 2023-12-06 PROCEDURE — 97530 THERAPEUTIC ACTIVITIES: CPT

## 2023-12-06 PROCEDURE — 84100 ASSAY OF PHOSPHORUS: CPT | Performed by: NURSE PRACTITIONER

## 2023-12-06 PROCEDURE — 85025 COMPLETE CBC W/AUTO DIFF WBC: CPT | Performed by: NURSE PRACTITIONER

## 2023-12-06 PROCEDURE — 25000003 PHARM REV CODE 250: Performed by: REGISTERED NURSE

## 2023-12-06 PROCEDURE — 63600175 PHARM REV CODE 636 W HCPCS: Performed by: REGISTERED NURSE

## 2023-12-06 PROCEDURE — 80053 COMPREHEN METABOLIC PANEL: CPT | Performed by: NURSE PRACTITIONER

## 2023-12-06 PROCEDURE — 97535 SELF CARE MNGMENT TRAINING: CPT | Mod: CO

## 2023-12-06 PROCEDURE — 97112 NEUROMUSCULAR REEDUCATION: CPT

## 2023-12-06 PROCEDURE — 63600175 PHARM REV CODE 636 W HCPCS

## 2023-12-06 PROCEDURE — 97112 NEUROMUSCULAR REEDUCATION: CPT | Mod: CO

## 2023-12-06 PROCEDURE — 94761 N-INVAS EAR/PLS OXIMETRY MLT: CPT

## 2023-12-06 RX ORDER — LABETALOL HCL 20 MG/4 ML
10 SYRINGE (ML) INTRAVENOUS EVERY 4 HOURS PRN
Status: DISCONTINUED | OUTPATIENT
Start: 2023-12-06 | End: 2023-12-15 | Stop reason: HOSPADM

## 2023-12-06 RX ORDER — AMLODIPINE BESYLATE 5 MG/1
5 TABLET ORAL DAILY
Status: DISCONTINUED | OUTPATIENT
Start: 2023-12-06 | End: 2023-12-07

## 2023-12-06 RX ADMIN — HYDRALAZINE HYDROCHLORIDE 25 MG: 25 TABLET, FILM COATED ORAL at 01:12

## 2023-12-06 RX ADMIN — MUPIROCIN: 20 OINTMENT TOPICAL at 08:12

## 2023-12-06 RX ADMIN — AMLODIPINE BESYLATE 5 MG: 5 TABLET ORAL at 08:12

## 2023-12-06 RX ADMIN — HEPARIN SODIUM 5000 UNITS: 5000 INJECTION INTRAVENOUS; SUBCUTANEOUS at 01:12

## 2023-12-06 RX ADMIN — SENNOSIDES AND DOCUSATE SODIUM 1 TABLET: 8.6; 5 TABLET ORAL at 08:12

## 2023-12-06 RX ADMIN — LABETALOL HYDROCHLORIDE 10 MG: 5 INJECTION, SOLUTION INTRAVENOUS at 06:12

## 2023-12-06 RX ADMIN — LABETALOL HYDROCHLORIDE 10 MG: 5 INJECTION, SOLUTION INTRAVENOUS at 09:12

## 2023-12-06 RX ADMIN — HEPARIN SODIUM 5000 UNITS: 5000 INJECTION INTRAVENOUS; SUBCUTANEOUS at 09:12

## 2023-12-06 RX ADMIN — MEMANTINE HYDROCHLORIDE 5 MG: 5 TABLET ORAL at 08:12

## 2023-12-06 RX ADMIN — HEPARIN SODIUM 5000 UNITS: 5000 INJECTION INTRAVENOUS; SUBCUTANEOUS at 05:12

## 2023-12-06 RX ADMIN — HYDRALAZINE HYDROCHLORIDE 25 MG: 25 TABLET, FILM COATED ORAL at 05:12

## 2023-12-06 RX ADMIN — HYDRALAZINE HYDROCHLORIDE 25 MG: 25 TABLET, FILM COATED ORAL at 09:12

## 2023-12-06 NOTE — ASSESSMENT & PLAN NOTE
Carries history of dementia. Oriented to self only at baseline per daughter. Prescribed Aricept and Namenda previously, though is unclear what medications, if any, he was taking at home. Per daughters, is close to cognitive baseline now. On memantine while admitted.

## 2023-12-06 NOTE — NURSING TRANSFER
Nursing Transfer Note      12/6/2023   5:00 PM    Nurse giving handoff::Sunshine  Nurse receiving handoff:Ludivina     Reason patient is being transferred: stable for NPU    Transfer To: 943    Transfer via bed    Transfer with cardiac monitoring    Transported by RN    Transfer Vital Signs:  Blood Pressure:153/98  Heart Rate:78  O2:98  Temperature:98  Respirations:18    Telemetry: 0733  Order for Tele Monitor? Yes    Additional Lines: n/a    4eyes on Skin: yes    Medicines sent: n/a      Any special needs or follow-up needed: n/a    Patient belongings transferred with patient: Yes    Chart send with patient: Yes    Notified: daughter    Patient reassessed at: 12/6/23    Upon arrival to floor: cardiac monitor applied, patient oriented to room, call bell in reach, and bed in lowest position

## 2023-12-06 NOTE — PT/OT/SLP PROGRESS
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  He presents with the following performance deficits affecting function are weakness, impaired endurance, impaired self care skills, impaired functional mobility, decreased coordination, impaired cognition, impaired balance, gait instability, decreased upper extremity function, decreased lower extremity function, decreased ROM, visual deficits, decreased safety awareness, pain, impaired fine motor, abnormal tone. Patient primarily limited by difficulty maintaining alertness during therapy. He was able to open eyes very briefly once EOB. Patient would benefit from continued OT services to address deficits and progress towards goals. Continue OT POC.    Rehab Prognosis:  Good; patient would benefit from acute skilled OT services to address these deficits and reach maximum level of function.       Plan:     Patient to be seen 3 x/week to address the above listed problems via self-care/home management, therapeutic activities, therapeutic exercises, neuromuscular re-education  Plan of Care Expires: 01/04/24  Plan of Care Reviewed with: patient    Subjective     Chief Complaint: patient somnolent today  Patient/Family Comments/goals: no patient family present  Pain/Comfort:  Pain Rating 1: 0/10  Pain Rating Post-Intervention 1: 0/10    Objective:     Communicated with: nurse and OTR prior to session.  Patient found HOB elevated  with blood pressure cuff, Condom Catheter, restraints, telemetry, pulse ox (continuous) upon OT entry to room.  A client care conference was completed by the OTR and the ESTEVEZ prior to treatment by the ESTEVEZ to discuss the patient's POC and current status.    General Precautions: Standard, fall, aspiration    Orthopedic Precautions:N/A  Braces: N/A  Respiratory Status: Room air     Occupational Performance:     Bed Mobility:    Patient completed Rolling/Turning to Left with  total assistance  Patient completed Rolling/Turning to Right with total assistance  Patient completed Scooting/Bridging with total assistance  Patient completed Supine to Sit with total assistance and 2 persons  Patient completed Sit to Supine with total assistance and 2 persons     Functional Mobility/Transfers:  Patient completed Sit <> Stand Transfer with dependence and of 2 persons  with  no assistive device   1st trial: complete, B knees blocked  2nd trial: incomplete- cleared hips  Functional Mobility: Static sitting balance: Total(A), significant extensor tone, max cues for weight shifting, hand placement, and attention to task     Activities of Daily Living:  Grooming: total assistance facial hygiene seated EOB  Toileting: total assistance perirectal hygiene standing (patient received incontinent of BM)      [facility] 6 Click ADL: 6    Treatment & Education:  Patient educated on OT goals and current progress with poor retention ability. Patient unable to follow one step commands today. Addressed all patient needs within ESTEVEZ scope of practice. Co-treatment performed with PT due to patient's complexity and benefit of 2 skilled therapists to facilitate functional and safe occupational performance, accommodate patient's activity tolerance, and maximize patient's participation in therapy.     Patient left HOB elevated with all lines intact, call button in reach, bed alarm on, and nurse present    GOALS:   Multidisciplinary Problems        Occupational Therapy Goals          Problem: Occupational Therapy    Goal Priority Disciplines Outcome Interventions   Occupational Therapy Goal     OT, PT/OT Ongoing, Progressing    Description: Goals to be met by: 1/4/23     Patient will increase functional independence with ADLs by performing:    UE Dressing with Moderate Assistance.  LE Dressing with Moderate Assistance.  Grooming while seated with Moderate Assistance.  Toileting from bedside commode with Moderate Assistance for hygiene and clothing management.   Stand pivot transfers with Moderate Assistance.  Toilet transfer to bedside commode with Moderate Assistance.                         Time Tracking:     OT Date of Treatment: 12/06/23  OT Start Time: 1034  OT Stop Time: 1057  OT Total Time (min): 23 min    Billable Minutes:Self Care/Home Management 9  Neuromuscular Re-education 14    OT/IDA: IDA     Number of IDA visits since last OT visit: 1 12/6/2023
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
## 2023-12-06 NOTE — SUBJECTIVE & OBJECTIVE
Objective:     Vitals:  Temp: 97.8 °F (36.6 °C)  Pulse: 87  Rhythm: normal sinus rhythm  BP: 125/80  MAP (mmHg): 98  Resp: (!) 24  SpO2: 98 %    Temp  Min: 97.8 °F (36.6 °C)  Max: 98 °F (36.7 °C)  Pulse  Min: 66  Max: 99  BP  Min: 105/71  Max: 224/128  MAP (mmHg)  Min: 83  Max: 165  Resp  Min: 8  Max: 39  SpO2  Min: 92 %  Max: 100 %    12/04 0701 - 12/05 0700  In: 638 [I.V.:171.1]  Out: 910 [Urine:910]   Unmeasured Output  Urine Occurrence: 1  Stool Occurrence: 0        Physical Exam  GA: Lethargic , no acute distress.   HEENT: No scleral icterus or JVD.   Pulmonary: Clear to auscultation A//L.   Cardiac: RRR S1 & S2 w/o rubs/murmurs/gallops.   Abdominal: Bowel sounds present x 4.   Skin: No jaundice, rashes, or visible lesions.  Neuro:  --GCS: E2 V3 M5  --Mental Status:  lethargic does not follow or attend, although moving volitionally and more interactive with daughter at bedside  --CN II-XII PERRLA, EOMi, face symmetric  -- Localizes upper withdrawal lowers and moving spontaneously     Unable to test orientation, language, memory, judgment, insight, fund of knowledge, gait due to level of consciousness.         Medications:  Continuousnicardipine, Last Rate: Stopped (12/05/23 0530)    Scheduledheparin (porcine), 5,000 Units, Q8H  hydrALAZINE, 25 mg, Q8H  memantine, 5 mg, BID  mupirocin, , BID  senna-docusate 8.6-50 mg, 1 tablet, BID    PRNcalcium gluconate IVPB, 1 g, PRN  calcium gluconate IVPB, 1 g, PRN  calcium gluconate IVPB, 1 g, PRN  labetalol, 10 mg, Q4H PRN  magnesium sulfate IVPB, 2 g, PRN  magnesium sulfate IVPB, 4 g, PRN  ondansetron, 4 mg, Q8H PRN  potassium chloride, 40 mEq, PRN   And  potassium chloride, 60 mEq, PRN   And  potassium chloride, 80 mEq, PRN      Today I personally reviewed pertinent medications, lines/drains/airways, imaging, cardiology results, laboratory results, microbiology results,     Diet  Diet Minced & Moist (IDDSI Level 5)  Diet Minced & Moist (IDDSI Level 5)

## 2023-12-06 NOTE — ASSESSMENT & PLAN NOTE
83M with HTN and dementia presenting for AMS from home found to have deep Lt cerebellar IPH with minor IVH    - NSGY and Vn following   - SBP <160  - CTH: Left cerebellar intraparenchymal hemorrhage with intraventricular extension into the 4th ventricle   - Stability & angiographic imaging stable and no vascular anomalies  - Anticoagulation status: none  - Q 1 vitals   -- q4 neuro checks for delirium  - TSH, A1c, lipid, echo and EKG performed and reviewed  -  Pt/OT/SLP eval and treat  - NPO unless passes SLP

## 2023-12-06 NOTE — PLAN OF CARE
"[facility] Care Plan    POC reviewed with Sheng Virgen and family at 1400. Will continue to monitor. See below and flowsheets for full assessment and VS info.     -Routine MRI ordered. Not able to be obtained. MD aware and okay.   -Patient to be observed over night and if stable to be stepdown to the floor tomorrow.         Is this a stroke patient? yes- Stroke booklet reviewed with patient and family, risk factors identified for patient and stroke booklet remains at bedside for ongoing education.     Neuro:  [place] Coma Scale  Best Eye Response: 4-->(E4) spontaneous  Best Motor Response: 5-->(M5) localizes pain  Best Verbal Response: 4-->(V4) confused  [place] Coma Scale Score: 13  Assessment Qualifiers: patient not sedated/intubated  Pupil PERRLA: yes     24 hr Temp:  [97.4 °F (36.3 °C)-98 °F (36.7 °C)]     CV:   Rhythm: normal sinus rhythm  BP goals:   SBP < 160  MAP > 65    Resp:           Plan: N/A    GI/:     Diet/Nutrition Received: NPO  Last Bowel Movement: 12/02/23  Voiding Characteristics: external catheter    Intake/Output Summary (Last 24 hours) at 12/5/2023 1829  Last data filed at 12/5/2023 0605  Gross per 24 hour   Intake 68.42 ml   Output 300 ml   Net -231.58 ml     Unmeasured Output  Urine Occurrence: 1  Stool Occurrence: 0    Labs/Accuchecks:  Recent Labs   Lab 12/05/23  0538   WBC 5.53   RBC 3.72*   HGB 11.4*   HCT 34.2*         Recent Labs   Lab 12/05/23  0538      K 3.7   CO2 30*      BUN 14   CREATININE 0.9   ALKPHOS 53*   ALT 13   AST 37   BILITOT 0.7      Recent Labs   Lab 12/04/23  0359 12/04/23  0654   INR 1.1  --    APTT  --  25.1    No results for input(s): "CPK", "CPKMB", "TROPONINI", "MB" in the last 168 hours.    Electrolytes: N/A - electrolytes WDL  Accuchecks: none    Gtts:   nicardipine Stopped (12/05/23 0530)       LDA/Wounds:  Lines/Drains/Airways       Drain  Duration             Female External Urinary Catheter 12/05/23 1440 <1 day              Peripheral " Intravenous Line  Duration                  Peripheral IV - Single Lumen 12/03/23 2206 20 G Right Antecubital 1 day         Peripheral IV - Single Lumen 12/04/23 1611 20 G Anterior;Distal;Left Forearm 1 day         Peripheral IV - Single Lumen 12/05/23 0632 20 G Anterior;Right Upper Arm <1 day                  Wounds: Yes  Wound care consulted: No

## 2023-12-06 NOTE — PT/OT/SLP EVAL
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 male with an SLP diagnosis of Dysphagia.  He presents with decreased alertness with h/o dementia    History:     Past Medical History:   Diagnosis Date    AR (allergic rhinitis)     Chronic hepatitis C without hepatic coma 8/1/2023    Hypertension     hereditary    Memory loss     Prostate cancer        History reviewed. No pertinent surgical history.    Social History: Patient lives with son.      Prior diet: malia.    Occupation/hobbies/homemaking: retired.      Subjective     Pt.'s daughter present during session.  She reported that she had not seen her father since 2/2023 but he appeared to have "declined"  Patient goals: malia     Pain/Comfort:  Pain Rating 1: 0/10  Pain Rating Post-Intervention 1: 0/10    Respiratory Status: Room air    Objective:     Cognitive Status:    Pt. Lethargic requiring frequent cues to maintain alertness.  No initiation of communication noted with delays in responding noted with repetition of stimuli needed to elicit responses.  Poor recall of personal biographical information noted.  He was not oriented to time or place       Receptive Language:   Comprehension:   Pt. Did not follow simple commands.  He did occasionally respond accurately to yes/no question in sp ontaneous context.     Pragmatics:  "   Flat affect  Poor eye contact  Delays in responding  Poor initiation    Expressive Language:  Verbal:    LImited verbalizations elicited, primarily single word responses in spontaneous context        Motor Speech:  SIngle words elicited were intelligible in known context    Voice:   wfl    Visual-Spatial:  deferred    Reading:   deferred     Written Expression:   deferred    Treatment: Upon entry to room, pt. Had eaten 100% of breakfast with daugher.  Daughter and nursing both reported that pt. Tolerated po intake without difficulty.  Education provided to daughter re cognitive deficits as well as slp plan of care.      Goals:   Multidisciplinary Problems       SLP Goals          Problem: SLP    Goal Priority Disciplines Outcome   SLP Goal     SLP Ongoing, Progressing   Description: Goals due 12/12  1.  Tolerate [facility] soft diet - level 5 with no s/s of aspiration  2.  Participate in speech langauge cognitive eval                       Plan:   Patient to be seen:  3 x/week   Plan of Care expires:  01/02/24  Plan of Care reviewed with:  patient, daughter   SLP Follow-Up:  Yes       Discharge recommendations:  Therapy Intensity Recommendations at Discharge:  (tbd)   Barriers to Discharge:  Decreased Care Giver Support    Time Tracking:     SLP Treatment Date:   12/06/23  Speech Start Time:  0830  Speech Stop Time:  0846     Speech Total Time (min):  16 min    Billable Minutes: Eval 8  and Self Care/Home Management Training 8    12/06/2023

## 2023-12-06 NOTE — PLAN OF CARE
[facility] Care Plan    POC reviewed with Sheng Virgen and family at 0300. Pt verbalized understanding. Questions and concerns addressed. No acute events overnight. Pt progressing toward goals. Will continue to monitor. See below and flowsheets for full assessment and VS info.     -Unable to complete MRI d/t pending approval from MRI to complete scan and limit portable cardiac monitoring on the unit   -Neuro exam stable and unchanged overnight   -No prn BP meds administered overnight. SBP remained <160.  -Improved appetite noted. Ate 100% of meal.  -1 BM overnight   -Bath given and linens changed       Is this a stroke patient? yes- Stroke booklet reviewed with patient and family, risk factors identified for patient and stroke booklet remains at bedside for ongoing education.     Neuro:  [place] Coma Scale  Best Eye Response: 4-->(E4) spontaneous  Best Motor Response: 6-->(M6) obeys commands  Best Verbal Response: 4-->(V4) confused  [place] Coma Scale Score: 14  Assessment Qualifiers: patient not sedated/intubated, no eye obstruction present  Pupil PERRLA: yes     24hr Temp:  [97.7 °F (36.5 °C)-98 °F (36.7 °C)]     CV:   Rhythm: normal sinus rhythm  BP goals:   SBP < 160  MAP > 65    Resp:           Plan: N/A    GI/:     Diet/Nutrition Received: mechanical/dental soft  Last Bowel Movement: 12/02/23  Voiding Characteristics: external catheter    Intake/Output Summary (Last 24 hours) at 12/6/2023 0503  Last data filed at 12/6/2023 0105  Gross per 24 hour   Intake 30.18 ml   Output 50 ml   Net -19.82 ml     Unmeasured Output  Urine Occurrence: 2  Stool Occurrence: 1  Emesis Occurrence: 0  Pad Count: 2    Labs/Accuchecks:  Recent Labs   Lab 12/05/23  0538   WBC 5.53   RBC 3.72*   HGB 11.4*   HCT 34.2*         Recent Labs   Lab 12/05/23  0538      K 3.7   CO2 30*      BUN 14   CREATININE 0.9   ALKPHOS 53*   ALT 13   AST 37   BILITOT 0.7      Recent Labs   Lab 12/04/23  0359 12/04/23  0654   INR 1.1  --   "  APTT  --  25.1    No results for input(s): "CPK", "CPKMB", "TROPONINI", "MB" in the last 168 hours.    Electrolytes: N/A - electrolytes WDL  Accuchecks: none    Gtts:   nicardipine Stopped (12/05/23 0530)       LDA/Wounds:  Lines/Drains/Airways       Drain  Duration             Male External Urinary Catheter 12/06/23 0105 Small <1 day              Peripheral Intravenous Line  Duration                  Peripheral IV - Single Lumen 12/03/23 2206 20 G Right Antecubital 2 days         Peripheral IV - Single Lumen 12/05/23 0632 20 G Anterior;Right Upper Arm <1 day                  Wounds: Yes  Wound care consulted: Yes   "

## 2023-12-06 NOTE — ASSESSMENT & PLAN NOTE
83M with HTN and dementia presenting for AMS from home found to have deep Lt cerebellar IPH with minor IVH    - NSGY and Vn following   - SBP <160  - CTH: Left cerebellar intraparenchymal hemorrhage with intraventricular extension into the 4th ventricle   - Stability & angiographic imaging stable and no vascular anomalies  - Anticoagulation status: none  - Q 4 vitals pending floor   -- q4 neuro checks for delirium  - TSH, A1c, lipid, echo and EKG performed and reviewed  -  Pt/OT/SLP eval and treat  - passed slp now on dysphagia

## 2023-12-06 NOTE — SUBJECTIVE & OBJECTIVE
Objective:     Vitals:  Temp: 98.7 °F (37.1 °C)  Pulse: 82  Rhythm: normal sinus rhythm  BP: (!) 152/106  MAP (mmHg): 116  Resp: (!) 22  SpO2: 95 %    Temp  Min: 97.7 °F (36.5 °C)  Max: 98.7 °F (37.1 °C)  Pulse  Min: 70  Max: 93  BP  Min: 105/71  Max: 160/100  MAP (mmHg)  Min: 83  Max: 124  Resp  Min: 12  Max: 39  SpO2  Min: 60 %  Max: 100 %    12/05 0701 - 12/06 0700  In: -   Out: 50 [Urine:50]   Unmeasured Output  Urine Occurrence: 2  Stool Occurrence: 1  Emesis Occurrence: 0  Pad Count: 2        Physical Exam  GA: Lethargic but slightly more alert today, no acute distress.   HEENT: No scleral icterus or JVD.   Pulmonary: Clear to auscultation A/L.   Cardiac: RRR S1 & S2 w/o rubs/murmurs/gallops.   Abdominal: Bowel sounds present x 4.   Skin: No jaundice, rashes, or visible lesions.  Neuro:  --GCS: E2 V3 M5  --Mental Status:  lethargic does not follow or attend, although moving volitionally and more interactive with daughter at bedside. Mumbles incoherently  --CN II-XII PERRLA, EOMi, face symmetric  -- Localizes upper withdrawal lowers and moving spontaneously       Medications:  Continuous Scheduledheparin (porcine), 5,000 Units, Q8H  hydrALAZINE, 25 mg, Q8H  memantine, 5 mg, BID  mupirocin, , BID  senna-docusate 8.6-50 mg, 1 tablet, BID    PRNcalcium gluconate IVPB, 1 g, PRN  calcium gluconate IVPB, 1 g, PRN  calcium gluconate IVPB, 1 g, PRN  labetalol, 10 mg, Q4H PRN  magnesium sulfate IVPB, 2 g, PRN  magnesium sulfate IVPB, 4 g, PRN  ondansetron, 4 mg, Q8H PRN  potassium chloride, 40 mEq, PRN   And  potassium chloride, 60 mEq, PRN   And  potassium chloride, 80 mEq, PRN      Today I personally reviewed pertinent medications, lines/drains/airways, imaging, cardiology results, laboratory results, microbiology results,     Diet  Diet Minced & Moist (IDDSI Level 5)  Diet Minced & Moist (IDDSI Level 5)

## 2023-12-06 NOTE — ASSESSMENT & PLAN NOTE
- SBP <160   - Echo and EKG obtained & reviewed  - NIcardipine gtt off x24 hours  - Labetalol and hydralazine prn

## 2023-12-06 NOTE — PROGRESS NOTES
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 Virgen is an 83-year-old male with history of dementia who presents to [facility] for Cerebellar ICH with IVH. He presented to ED with family for increased confusion and decreased mental status. Per family  He has become quite confused and out of it over the last 2 days.  They reported he fell out of his chair yesterday and  He has been confused.   C Th in ED revealed Cerebellar ich with ivh into 4th ventricle. He is being admitted to [facility] for a higher level of care. History from chart due to LOC     Hospital Course: 12/05/2023 No acute events overnight, examination stable on delirium precautions, CTH stable this AM and CTA without vascular anomalies. MRI pending for CAA evaluation, weaning nicardipine.  12/6/2023: Slightly more alert today, unable to obtain MRI overnight no other major changes, dispo planning ongoing see case management notes.       Objective:     Vitals:  Temp: 98.7 °F (37.1 °C)  Pulse: 82  Rhythm: normal sinus rhythm  BP: (!) 152/106  MAP (mmHg): 116  Resp: (!) 22  SpO2: 95 %    Temp  Min: 97.7 °F (36.5 °C)  Max: 98.7 °F (37.1 °C)  Pulse  Min: 70  Max: 93  BP  Min: 105/71  Max: 160/100  MAP (mmHg)  Min: 83  Max: 124  Resp  Min: 12  Max: 39  SpO2  Min: 60 %  Max: 100 %    12/05 0701 - 12/06 0700  In: -   Out: 50 [Urine:50]   Unmeasured Output  Urine Occurrence: 2  Stool Occurrence: 1  Emesis Occurrence: 0  Pad Count: 2        Physical Exam  GA: Lethargic but slightly more alert today, no acute distress.   HEENT: No scleral icterus or JVD.   Pulmonary: Clear to auscultation A/L.   Cardiac: RRR S1 & S2 w/o rubs/murmurs/gallops.   Abdominal: Bowel sounds present x 4.   Skin: No jaundice, rashes, or visible lesions.  Neuro:  --GCS: E2 V3  M5  --Mental Status:  lethargic does not follow or attend, although moving volitionally and more interactive with daughter at bedside. Mumbles incoherently  --CN II-XII PERRLA, EOMi, face symmetric  -- Localizes upper withdrawal lowers and moving spontaneously       Medications:  Continuous Scheduledheparin (porcine), 5,000 Units, Q8H  hydrALAZINE, 25 mg, Q8H  memantine, 5 mg, BID  mupirocin, , BID  senna-docusate 8.6-50 mg, 1 tablet, BID    PRNcalcium gluconate IVPB, 1 g, PRN  calcium gluconate IVPB, 1 g, PRN  calcium gluconate IVPB, 1 g, PRN  labetalol, 10 mg, Q4H PRN  magnesium sulfate IVPB, 2 g, PRN  magnesium sulfate IVPB, 4 g, PRN  ondansetron, 4 mg, Q8H PRN  potassium chloride, 40 mEq, PRN   And  potassium chloride, 60 mEq, PRN   And  potassium chloride, 80 mEq, PRN      Today I personally reviewed pertinent medications, lines/drains/airways, imaging, cardiology results, laboratory results, microbiology results,     Diet  Diet Minced & Moist (IDDSI Level 5)  Diet Minced & Moist (IDDSI Level 5)      Assessment/Plan:     Neuro  * 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  83M with HTN and dementia presenting for AMS from home found to have deep Lt cerebellar IPH with minor IVH    - NSGY and Vn following   - SBP <160  - CTH: Left cerebellar intraparenchymal hemorrhage with intraventricular extension into the 4th ventricle   - Stability & angiographic imaging stable and no vascular anomalies  - Anticoagulation status: none  - Q 4 vitals pending floor   -- q4 neuro checks for delirium  - TSH, A1c, lipid, echo and EKG performed and reviewed  -  Pt/OT/SLP eval and treat  - passed slp now on dysphagia      Dementia  Reported baseline, prescriptions consistent with this    Brain compression  - see ich     IVH (intraventricular hemorrhage)  - see ich       Cardiac/Vascular  Hypertension  - SBP <160   - Echo and EKG obtained & reviewed  - NIcardipine gtt off x24 hours  - Labetalol and hydralazine  prn           The patient is being Prophylaxed for:  Venous Thromboembolism with: Mechanical or Chemical  Stress Ulcer with: None  Ventilator Pneumonia with: not applicable    Activity Orders            Diet Minced & Moist (IDDSI Level 5): Minced & Moist starting at 12/05 1041    Turn patient starting at 12/04 0400    Elevate HOB starting at 12/04 0311          Full Code    Dispo: step down to vascular neurology    Billing: Level 3    Sameer Alonso, DO  Neurocritical Care  [facility] - Neuro Critical Care

## 2023-12-06 NOTE — PT/OT/SLP PROGRESS
Physical Therapy Co-Treatment    Patient Name: Sheng Virgen   MRN: [id number]    Co-treatment performed for this visit due to patient need for two skilled therapists to ensure patient and staff safety and to accommodate for patient activity tolerance/pain management   Recommendations:     Discharge Recommendations: Moderate Intensity Therapy  Discharge Equipment Recommendations: hospital bed, lift device, bedside commode, wheelchair  Barriers to discharge: Increased level of assist and Decreased caregiver support    Assessment:     Sheng Virgen is a 83 y.o. 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 He presents with the following impairments/functional limitations: weakness, impaired endurance, impaired self care skills, impaired functional mobility, gait instability, impaired balance, decreased safety awareness, decreased lower extremity function, decreased upper extremity function, impaired coordination, impaired cognition, visual deficits. Pt with poor tolerance to therapy session on this date. Pt with absent command following today and very agitated throughout treatment resulting in limited functional mobility observed. Pt continues to be limited by poor postural stability, AMS, poor activity tolerance, and poor participation in therapy session. Pt would continue to benefit from skilled acute PT in order to address current deficits and progress functional mobility.      Rehab Prognosis: Fair; patient continues to benefit from acute skilled PT services to address these deficits and reach maximum level of function.  Recent Surgery: * No surgery found *      Plan:     During this hospitalization, patient to be seen 3 x/week to address the identified rehab impairments via gait training, therapeutic activities, therapeutic exercises, neuromuscular re-education and progress toward the following goals:    Plan of Care Expires:  01/04/24    Subjective     Chief  Complaint: None verbalized  Patient/Family Comments/Goals: None  Pain/Comfort:  Pain Rating 1: 0/10    Objective:     Communicated with RN prior to session. Patient found HOB elevated with blood pressure cuff, pulse ox (continuous), telemetry, Condom Catheter ([place] mitts) upon PT entry to room.     General Precautions: Standard, aspiration, fall  Orthopedic Precautions: N/A  Braces: N/A    Functional Mobility:  Bed Mobility:  Verbal cues for sequencing and technique  Scooting: total assistance  Supine to Sit: total assistance for LE management and trunk management  Sit to Supine: total assistance for LE management and trunk management  Transfers:   Sit to Stand: x2 reps from EOB; dependence of 2 persons with no AD with cues for hand placement and foot placement  Verbal cues for upright posture, increased glute activation, and improved anterior weight shift  Pt actively resisting STS during both trials; able to achieve full upright during 1st rep and hip clearance during 2nd rep  Balance:   Static Sitting: Poor, able to maintain for 8 minute(s) with total assistance 2/2 posterior lean  Verbal cues for upright posture, reduction of posterior lean, improved use of BUE for support,and command following   Dynamic Sitting: Poor: Patient unable to accept challenge or move without loss of balance, total assistance  Pt completed facial grooming with towel at EOB with OT. Pt requiring total assist for this task and refusing to grasp towel with either UE  Static Standing: Poor, able to maintain for 30 seconds with dependence of 2 persons  Verbal cues for upright posture and increased hip extension  Dynamic Standing: not assessed this visit    AM-PAC 6 CLICK MOBILITY  Turning over in bed (including adjusting bedclothes, sheets and blankets)?: 2  Sitting down on and standing up from a chair with arms (e.g., wheelchair, bedside commode, etc.): 1  Moving from lying on back to sitting on the side of the bed?: 1  Moving to and from  a bed to a chair (including a wheelchair)?: 1  Need to walk in hospital room?: 1  Climbing 3-5 steps with a railing?: 1  Basic Mobility Total Score: 7     Therapeutic Activities and Exercises:  Patient educated on role of acute care PT and PT POC, safety while in hospital including calling nurse for mobility, and call light usage  Answered all questions within PT scope of practice and addressed functional mobility concerns.  Pt educated on importance of maximal participation in therapy in order to reduce negative effects of prolonged sedentary positioning.   Pt provided with daily orientation.  Pt soiled with incontinent BM once seated at EOB. RN assisted with pericare and chuckpad change during x2 standing trials.     Patient left HOB elevated with all lines intact, call button in reach, RN notified, and RN present.    GOALS:   Multidisciplinary Problems       Physical Therapy Goals          Problem: Physical Therapy    Goal Priority Disciplines Outcome Goal Variances Interventions   Physical Therapy Goal     PT, PT/OT Ongoing, Progressing     Description: Goals to be met by: 2024     Patient will increase functional independence with mobility by performin. Supine to sit with Moderate Assistance  2. Sit to supine with Moderate Assistance  3. Sit to stand transfer with Moderate Assistance  4. Bed to chair transfer with Moderate Assistance using LRAD  5. Gait  x 20 feet with Moderate Assistance using LRAD.   6. Sitting at edge of bed x10 minutes with Contact Guard Assistance  7. Lower extremity exercise program x15 reps per handout, with assistance as needed                         Time Tracking:     PT Received On: 23  PT Start Time: 1034     PT Stop Time: 1057  PT Total Time (min): 23 min     Billable Minutes: Therapeutic Activity 13 and Neuromuscular Re-education 10      Treatment Type: Treatment  PT/PTA: PT     Number of PTA visits since last PT visit: 0     2023

## 2023-12-06 NOTE — HOSPITAL COURSE
12/5: exam much improved today; patient able to move all four extremities, and occasionally communicate clearly, though is still confused; not very cooperative with exam; off Cardene as of 5:30AM, received prn labetalol around 2AM this morning; on oral hydralazine; CTA done this morning and is without evidence of underlying vascular lesion; echo with normal size of left and right atrium, EF 60-65%; follow-up MRI is ordered. Blood cultures 1/4 growing staph in clusters, MRSA PCR negative.   12/6: neuro exam is stable; remains off cardene, no prns needed; just on po hydral; pending MRI brain w/SWI; Scr trending up from 0.9 to 1.2; mildly hypokalemic at 3.4; hemoglobin trending down from 11/4 to 10.2, but no signs of bleeding per nusring and primary team; blood cultures with 1/4 bottles positive, but growing likely contaminant (microccocus spp); stable to step down to NPU. Primary team had extensive discussion with family, and is unclear if son has POA documentation.  12/7: stepped down from NPU; creatinine down-trending; mild hypokalemia, replaced; BP elevated, started losartan, but will run a liter of fluids slowly given slight increase in creatinine in setting of poor po intake; mentation waxes and wanes, likely reflecting delirium; starting seroquel; pending mri brain w/swi; blood cultures growing rhizobacter as well, unsure if this is contaminant, will discuss w/ID service.  12/8: neuro exam stable; up all night; increased seroquel to 50; BP elevated, increased losartan, added nifedipine, and dc'ed hydralazine; mri brain pending, radiology concerned about BBs in patient's leg seen on x-ray; waiting on micro lab regrowing blood cultures for Rhizobium, which has been verified, will re-culture.  12/9: neuro exam stable; repeat blood cultures w/o growth thus far.  12/10: neuro exam stable; repeat blood cultures w/o growth, and still no systemic signs of infection; discussed again w/ID and will start a 2 week course of  Cipro  12/11 cipro discontinued per ID as culture results from 12/3 suspected to be likely contaminant, pending SNF placement  12/12: Abx discontinued as blood culture likely contaminant. Still pending SNF placement. NAEON. AF, 108-173 SBP. Isolated hypertension episodes likely 2/2 pain / agitation. Will trial out of mittens today.  12/13: patient remained out of mittens overnight, telesitter discontinued, starting small dose of daytime seroquel, pending placement at [facility], family completing paperwork  12/14: NAEON. AF, 100-197 SBP. Nifedipine increased to 90mg qd. K+ 4.9 today from 3.4 yesterday, visible hemolysis, repeating afternoon BMP. Medically ready for discharge likely tomorrow.   12/15/2023 NAEO, neuro exam grossly stable. BPs improved with med changes yeterday. K stable. Stable for discharge to SNF today.

## 2023-12-06 NOTE — PROGRESS NOTES
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 Virgen is an 83-year-old male with history of dementia who presents to [facility] for Cerebellar ICH with IVH. He presented to ED with family for increased confusion and decreased mental status. Per family  He has become quite confused and out of it over the last 2 days.  They reported he fell out of his chair yesterday and  He has been confused.   C Th in ED revealed Cerebellar ich with ivh into 4th ventricle. He is being admitted to [facility] for a higher level of care. History from chart due to LOC     Hospital Course: 12/05/2023 No acute events overnight, examination stable on delirium precautions, CTH stable this AM and CTA without vascular anomalies. MRI pending for CAA evaluation, weaning nicardipine.      Objective:     Vitals:  Temp: 97.8 °F (36.6 °C)  Pulse: 87  Rhythm: normal sinus rhythm  BP: 125/80  MAP (mmHg): 98  Resp: (!) 24  SpO2: 98 %    Temp  Min: 97.8 °F (36.6 °C)  Max: 98 °F (36.7 °C)  Pulse  Min: 66  Max: 99  BP  Min: 105/71  Max: 224/128  MAP (mmHg)  Min: 83  Max: 165  Resp  Min: 8  Max: 39  SpO2  Min: 92 %  Max: 100 %    12/04 0701 - 12/05 0700  In: 638 [I.V.:171.1]  Out: 910 [Urine:910]   Unmeasured Output  Urine Occurrence: 1  Stool Occurrence: 0        Physical Exam  GA: Lethargic , no acute distress.   HEENT: No scleral icterus or JVD.   Pulmonary: Clear to auscultation A//L.   Cardiac: RRR S1 & S2 w/o rubs/murmurs/gallops.   Abdominal: Bowel sounds present x 4.   Skin: No jaundice, rashes, or visible lesions.  Neuro:  --GCS: E2 V3 M5  --Mental Status:  lethargic does not follow or attend, although moving volitionally and more interactive with daughter at bedside  --CN II-XII PERRLA, EOMi, face symmetric  -- Localizes upper withdrawal  lowers and moving spontaneously     Unable to test orientation, language, memory, judgment, insight, fund of knowledge, gait due to level of consciousness.         Medications:  Continuousnicardipine, Last Rate: Stopped (12/05/23 0530)    Scheduledheparin (porcine), 5,000 Units, Q8H  hydrALAZINE, 25 mg, Q8H  memantine, 5 mg, BID  mupirocin, , BID  senna-docusate 8.6-50 mg, 1 tablet, BID    PRNcalcium gluconate IVPB, 1 g, PRN  calcium gluconate IVPB, 1 g, PRN  calcium gluconate IVPB, 1 g, PRN  labetalol, 10 mg, Q4H PRN  magnesium sulfate IVPB, 2 g, PRN  magnesium sulfate IVPB, 4 g, PRN  ondansetron, 4 mg, Q8H PRN  potassium chloride, 40 mEq, PRN   And  potassium chloride, 60 mEq, PRN   And  potassium chloride, 80 mEq, PRN      Today I personally reviewed pertinent medications, lines/drains/airways, imaging, cardiology results, laboratory results, microbiology results,     Diet  Diet Minced & Moist (IDDSI Level 5)  Diet Minced & Moist (IDDSI Level 5)        Assessment/Plan:     Neuro  * 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  83M with HTN and dementia presenting for AMS from home found to have deep Lt cerebellar IPH with minor IVH    - NSGY and Vn following   - SBP <160  - CTH: Left cerebellar intraparenchymal hemorrhage with intraventricular extension into the 4th ventricle   - Stability & angiographic imaging stable and no vascular anomalies  - Anticoagulation status: none  - Q 1 vitals   -- q4 neuro checks for delirium  - TSH, A1c, lipid, echo and EKG performed and reviewed  -  Pt/OT/SLP eval and treat  - NPO unless passes SLP      Dementia  Reported baseline, prescriptions consistent with this    Brain compression  - see ich     IVH (intraventricular hemorrhage)  - see ich       Cardiac/Vascular  Hypertension  - SBP <160   - Echo and EKG obtained & reviewed  - NIcardipine gtt weaning to off  - Labetalol and hydralazine prn           The patient is being Prophylaxed for:  Venous  Thromboembolism with: Mechanical or Chemical  Stress Ulcer with: None  Ventilator Pneumonia with: not applicable    Activity Orders            Diet Minced & Moist (IDDSI Level 5): Minced & Moist starting at 12/05 1041    Turn patient starting at 12/04 0400    Elevate HOB starting at 12/04 0311          Full Code    Dispo: ICU for monitoring while weaning nicardipine    Billing: Level 3    Sameer Alonso DO  Neurocritical Care  [facility] - Neuro Critical Care

## 2023-12-06 NOTE — SUBJECTIVE & OBJECTIVE
Neurologic Chief Complaint: ICH    Subjective:     Interval History: Patient is seen for follow-up neurological assessment and treatment recommendations: neuro exam is stable; remains off cardene, just on po hydral; pending MRI brain w/SWI; Scr trending up from 0.9 to 1.2; mildly hypokalemic at 3.4; hemoglobin trending down from 11/4 to 10.2, but no signs of bleeding per nursing and primary team; blood cultures with 1/4 bottles positive, but growing likely contaminant (microccocus spp); stable to step down to NPU. Primary team had extensive discussion with family, and is unclear if son has POA documentation.     HPI, Past Medical, Family, and Social History remains the same as documented in the initial encounter.     Review of Systems   Unable to perform ROS: Dementia     Scheduled Meds:   heparin (porcine)  5,000 Units Subcutaneous Q8H    hydrALAZINE  25 mg Oral Q8H    memantine  5 mg Oral BID    mupirocin   Nasal BID    senna-docusate 8.6-50 mg  1 tablet Oral BID     Continuous Infusions:      PRN Meds:calcium gluconate IVPB, calcium gluconate IVPB, calcium gluconate IVPB, labetalol, magnesium sulfate IVPB, magnesium sulfate IVPB, ondansetron, potassium chloride **AND** potassium chloride **AND** potassium chloride    Objective:     Vital Signs (Most Recent):  Temp: 98.7 °F (37.1 °C) (12/06/23 1101)  Pulse: 82 (12/06/23 1301)  Resp: (!) 22 (12/06/23 1301)  BP: (!) 152/106 (12/06/23 1301)  SpO2: 95 % (12/06/23 1301)  BP Location: Left arm    Vital Signs Range (Last 24H):  Temp:  [97.7 °F (36.5 °C)-98.7 °F (37.1 °C)]   Pulse:  [70-93]   Resp:  [12-39]   BP: (105-160)/()   SpO2:  [60 %-100 %]   BP Location: Left arm       Physical Exam  Vitals and nursing note reviewed.   Constitutional:       Appearance: He is ill-appearing.   HENT:      Head: Normocephalic and atraumatic.      Mouth/Throat:      Mouth: Mucous membranes are dry.   Cardiovascular:      Rate and Rhythm: Normal rate and regular rhythm.    Pulmonary:      Effort: Pulmonary effort is normal.   Abdominal:      Palpations: Abdomen is soft.   Musculoskeletal:      Right lower leg: No edema.      Left lower leg: No edema.   Skin:     General: Skin is dry.   Neurological:      Mental Status: He is alert.              Neurological Exam:   LOC: alert  Attention Span: poor  Language: No aphasia  Articulation: No dysarthria  Orientation: oriented to self only  Visual Fields: limited by patient being uncooperative with exam; does not appear to blink to threat when tested  EOM (CN III, IV, VI): Full/intact  Pupils (CN II, III): PERRL  Facial Movement (CN VII): Symmetric facial expression    Motor: Arm left  Paresis: 4+/5  Leg left  Paresis: 4+/5  Arm right  Paresis: 4+/5  Leg right Paresis: 4+/5  Cerebellum: difficult to test due to patient's lack of cooperativeness with exam  Localizes pain throughout all four extremities    Laboratory:  BMP:   Recent Labs   Lab 12/06/23  0602      K 3.4*      CO2 30*   BUN 27*   CREATININE 1.2   CALCIUM 8.9       CBC:   Recent Labs   Lab 12/06/23  0602   WBC 5.52   RBC 3.34*   HGB 10.2*   HCT 30.9*      MCV 93   MCH 30.5   MCHC 33.0       Lipid Panel:   Recent Labs   Lab 12/04/23  0359   CHOL 175   LDLCALC 105.8   HDL 58   TRIG 56       Hgb A1C:   Recent Labs   Lab 12/04/23  0359   HGBA1C 4.8       TSH:   Recent Labs   Lab 12/04/23  0359   TSH 5.463*         Diagnostic Results     Brain Imaging   CT Head 12/3/23  1. Left cerebellar intraparenchymal hemorrhage with intraventricular extension into the 4th ventricle.  2. No acute fracture or traumatic dislocation of the cervical spine.  3. Paranasal sinus disease.  4. Degenerative changes of the cervical spine most pronounced at C5-C6 with moderate spinal canal stenosis and severe bilateral neural foraminal narrowing.    CT Head 12/4/23: 4:01  Redemonstration of left cerebellar hemorrhage, unchanged in size and configuration from prior exam with continued  interventricular extension.  No significant interval detrimental change compared to prior head CT.     CT Head 12/4/23: 13:47  Similar left cerebellar intraparenchymal hemorrhage with extension into the 4th ventricle.       Vessel Imaging   CTA Head 12/5/23  Stable left cerebellar hemorrhage, with mild extension into the 4th ventricle. Scattered mild intracranial atherosclerosis with no evidence of aneurysm, significant stenosis, or occlusion. Atherosclerosis about the visualized extracranial vasculature, noting potential focal ulceration about the distal left cervical internal carotid artery.    Cardiac Imaging   Echo 12/5/23    Left Ventricle: The left ventricle is normal in size. Normal wall thickness. There is concentric remodeling. Normal wall motion. There is normal systolic function with a visually estimated ejection fraction of 60 - 65%. Diastolic function cannot be reliably determined in the presence of mitral valve disease.    Right Ventricle: Normal right ventricular cavity size. Wall thickness is normal. Right ventricle wall motion  is normal. Systolic function is normal.    Aortic Valve: The aortic valve is a trileaflet valve. There is mild aortic valve sclerosis. There is annular calcification present. There is mild aortic regurgitation.    Mitral Valve: There is moderate mitral annular calcification present. There is normal leaflet mobility. There is systolic anterior motion of the mitral valve. There is moderate stenosis. The mean pressure gradient across the mitral valve is 8 mmHg at a heart rate of 96 bpm. There is no significant regurgitation.    Tricuspid Valve: There is mild regurgitation.    Pulmonary Artery: The estimated pulmonary artery systolic pressure is 36 mmHg.    IVC/SVC: Normal venous pressure at 3 mmHg.    Pericardium: Left pleural effusion.

## 2023-12-06 NOTE — ASSESSMENT & PLAN NOTE
-Due to stroke and noted on imaging.  4th ventricle compression specifically noted.    -The patient is admitted to [facility] for close monitoring, hourly neuro checks.  -Neurosurgery consulted by primary team  -Likely etiology is hypertension  -Repeat CTH stable

## 2023-12-06 NOTE — PLAN OF CARE
PASRR and 142 uploaded to [employer].     Lou Reveles RN, CCRN-K, [facility]  Neuro-Critical Care   [phone]

## 2023-12-06 NOTE — PLAN OF CARE
"[facility] Care Plan    POC reviewed with Sheng iVrgen and family at 1400. No acute events today. Pt progressing toward goals. Will continue to monitor. See below and flowsheets for full assessment and VS info.     -Transfer orders placed. Awaiting room placement.   -MRI not obtained due to availability. See note.            Is this a stroke patient? yes- Stroke booklet reviewed with patient and family, risk factors identified for patient and stroke booklet remains at bedside for ongoing education.     Neuro:  Elmo Coma Scale  Best Eye Response: 4-->(E4) spontaneous  Best Motor Response: 6-->(M6) obeys commands  Best Verbal Response: 4-->(V4) confused  Elmo Coma Scale Score: 14  Assessment Qualifiers: patient not sedated/intubated  Pupil PERRLA: yes     24 hr Temp:  [97.7 °F (36.5 °C)-98.7 °F (37.1 °C)]     CV:   Rhythm: normal sinus rhythm  BP goals:   SBP < 160  MAP > 65    Resp:           Plan: N/A    GI/:     Diet/Nutrition Received: mechanical/dental soft  Last Bowel Movement: 12/06/23  Voiding Characteristics: external catheter    Intake/Output Summary (Last 24 hours) at 12/6/2023 1609  Last data filed at 12/6/2023 1501  Gross per 24 hour   Intake 720 ml   Output 750 ml   Net -30 ml     Unmeasured Output  Urine Occurrence: 2  Stool Occurrence: 1  Emesis Occurrence: 0  Pad Count: 2    Labs/Accuchecks:  Recent Labs   Lab 12/06/23  0602   WBC 5.52   RBC 3.34*   HGB 10.2*   HCT 30.9*         Recent Labs   Lab 12/06/23  0602      K 3.4*   CO2 30*      BUN 27*   CREATININE 1.2   ALKPHOS 85   ALT 13   AST 33   BILITOT 0.3      Recent Labs   Lab 12/04/23  0359 12/04/23  0654   INR 1.1  --    APTT  --  25.1    No results for input(s): "CPK", "CPKMB", "TROPONINI", "MB" in the last 168 hours.    Electrolytes: N/A - electrolytes WDL  Accuchecks: none    Gtts:      LDA/Wounds:  Lines/Drains/Airways       Drain  Duration             Male External Urinary Catheter 12/06/23 0105 Small <1 day              " Peripheral Intravenous Line  Duration                  Peripheral IV - Single Lumen 12/06/23 0730 20 G Anterior;Left;Proximal Forearm <1 day                  Wounds: Yes  Wound care consulted: No

## 2023-12-06 NOTE — PLAN OF CARE
CM received a call from Gerard Viregn (daughter) [phone].  Per daughter, the patient's son's girlfriend, (Donya Reyes [phone] --son's girlfriend),wants to call CM or SW tomorrow to inform CM/SW on patient's living conditions prior to admit.  Per daughter, she is ok with son's girlfriend sharing information but not discussing patient's medical condition.      Lou Reveles RN, CCRN-K, [facility]  Neuro-Critical Care   [phone]

## 2023-12-06 NOTE — ASSESSMENT & PLAN NOTE
- SBP <160   - Echo and EKG obtained & reviewed  - NIcardipine gtt weaning to off  - Labetalol and hydralazine prn

## 2023-12-06 NOTE — ASSESSMENT & PLAN NOTE
-Stroke risk factor.  SBP<160  -Off Cardene  -On oral hydralazine w/PRN labetalol  -With uptrending Scr, can start CCB after stepdown  -Per discussion with family, was not taking any antihypertensives at home
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
## 2023-12-07 LAB
ALBUMIN SERPL BCP-MCNC: 2.9 G/DL (ref 3.5–5.2)
ALP SERPL-CCNC: 67 U/L (ref 55–135)
ALT SERPL W/O P-5'-P-CCNC: 13 U/L (ref 10–44)
ANION GAP SERPL CALC-SCNC: 8 MMOL/L (ref 8–16)
AST SERPL-CCNC: 31 U/L (ref 10–40)
BASOPHILS # BLD AUTO: 0.02 K/UL (ref 0–0.2)
BASOPHILS NFR BLD: 0.4 % (ref 0–1.9)
BILIRUB SERPL-MCNC: 0.4 MG/DL (ref 0.1–1)
BUN SERPL-MCNC: 24 MG/DL (ref 8–23)
CALCIUM SERPL-MCNC: 8.8 MG/DL (ref 8.7–10.5)
CHLORIDE SERPL-SCNC: 99 MMOL/L (ref 95–110)
CO2 SERPL-SCNC: 31 MMOL/L (ref 23–29)
CREAT SERPL-MCNC: 1.1 MG/DL (ref 0.5–1.4)
DIFFERENTIAL METHOD: ABNORMAL
EOSINOPHIL # BLD AUTO: 0.1 K/UL (ref 0–0.5)
EOSINOPHIL NFR BLD: 1.5 % (ref 0–8)
ERYTHROCYTE [DISTWIDTH] IN BLOOD BY AUTOMATED COUNT: 15.9 % (ref 11.5–14.5)
EST. GFR  (NO RACE VARIABLE): >60 ML/MIN/1.73 M^2
GLUCOSE SERPL-MCNC: 89 MG/DL (ref 70–110)
HCT VFR BLD AUTO: 32.5 % (ref 40–54)
HGB BLD-MCNC: 10.6 G/DL (ref 14–18)
IMM GRANULOCYTES # BLD AUTO: 0.02 K/UL (ref 0–0.04)
IMM GRANULOCYTES NFR BLD AUTO: 0.4 % (ref 0–0.5)
LYMPHOCYTES # BLD AUTO: 1.4 K/UL (ref 1–4.8)
LYMPHOCYTES NFR BLD: 25.8 % (ref 18–48)
MCH RBC QN AUTO: 29.9 PG (ref 27–31)
MCHC RBC AUTO-ENTMCNC: 32.6 G/DL (ref 32–36)
MCV RBC AUTO: 92 FL (ref 82–98)
MONOCYTES # BLD AUTO: 0.4 K/UL (ref 0.3–1)
MONOCYTES NFR BLD: 7.6 % (ref 4–15)
NEUTROPHILS # BLD AUTO: 3.4 K/UL (ref 1.8–7.7)
NEUTROPHILS NFR BLD: 64.3 % (ref 38–73)
NRBC BLD-RTO: 0 /100 WBC
PLATELET # BLD AUTO: 158 K/UL (ref 150–450)
PMV BLD AUTO: 9.8 FL (ref 9.2–12.9)
POTASSIUM SERPL-SCNC: 3.1 MMOL/L (ref 3.5–5.1)
PROT SERPL-MCNC: 7.1 G/DL (ref 6–8.4)
RBC # BLD AUTO: 3.54 M/UL (ref 4.6–6.2)
SODIUM SERPL-SCNC: 138 MMOL/L (ref 136–145)
WBC # BLD AUTO: 5.27 K/UL (ref 3.9–12.7)

## 2023-12-07 PROCEDURE — 25000003 PHARM REV CODE 250

## 2023-12-07 PROCEDURE — 36415 COLL VENOUS BLD VENIPUNCTURE: CPT | Performed by: NURSE PRACTITIONER

## 2023-12-07 PROCEDURE — 80053 COMPREHEN METABOLIC PANEL: CPT | Performed by: NURSE PRACTITIONER

## 2023-12-07 PROCEDURE — 63600175 PHARM REV CODE 636 W HCPCS: Performed by: REGISTERED NURSE

## 2023-12-07 PROCEDURE — 25000003 PHARM REV CODE 250: Performed by: PHYSICIAN ASSISTANT

## 2023-12-07 PROCEDURE — 63600175 PHARM REV CODE 636 W HCPCS

## 2023-12-07 PROCEDURE — 25000003 PHARM REV CODE 250: Performed by: REGISTERED NURSE

## 2023-12-07 PROCEDURE — 85025 COMPLETE CBC W/AUTO DIFF WBC: CPT | Performed by: NURSE PRACTITIONER

## 2023-12-07 PROCEDURE — 11000001 HC ACUTE MED/SURG PRIVATE ROOM

## 2023-12-07 PROCEDURE — 99233 SBSQ HOSP IP/OBS HIGH 50: CPT | Mod: GC,,, | Performed by: PSYCHIATRY & NEUROLOGY

## 2023-12-07 PROCEDURE — 25000003 PHARM REV CODE 250: Performed by: PSYCHIATRY & NEUROLOGY

## 2023-12-07 PROCEDURE — 99233 PR SUBSEQUENT HOSPITAL CARE,LEVL III: ICD-10-PCS | Mod: GC,,, | Performed by: PSYCHIATRY & NEUROLOGY

## 2023-12-07 RX ORDER — HYDRALAZINE HYDROCHLORIDE 25 MG/1
50 TABLET, FILM COATED ORAL EVERY 8 HOURS
Status: DISCONTINUED | OUTPATIENT
Start: 2023-12-07 | End: 2023-12-07

## 2023-12-07 RX ORDER — SODIUM CHLORIDE 9 MG/ML
INJECTION, SOLUTION INTRAVENOUS CONTINUOUS
Status: ACTIVE | OUTPATIENT
Start: 2023-12-07 | End: 2023-12-07

## 2023-12-07 RX ORDER — HYDRALAZINE HYDROCHLORIDE 25 MG/1
25 TABLET, FILM COATED ORAL ONCE
Status: DISCONTINUED | OUTPATIENT
Start: 2023-12-07 | End: 2023-12-07

## 2023-12-07 RX ORDER — POTASSIUM CHLORIDE 20 MEQ/1
40 TABLET, EXTENDED RELEASE ORAL ONCE
Status: COMPLETED | OUTPATIENT
Start: 2023-12-07 | End: 2023-12-07

## 2023-12-07 RX ORDER — QUETIAPINE FUMARATE 25 MG/1
25 TABLET, FILM COATED ORAL NIGHTLY
Status: DISCONTINUED | OUTPATIENT
Start: 2023-12-07 | End: 2023-12-08

## 2023-12-07 RX ORDER — HYDRALAZINE HYDROCHLORIDE 25 MG/1
50 TABLET, FILM COATED ORAL EVERY 8 HOURS
Status: DISCONTINUED | OUTPATIENT
Start: 2023-12-07 | End: 2023-12-08

## 2023-12-07 RX ORDER — LANOLIN ALCOHOL/MO/W.PET/CERES
400 CREAM (GRAM) TOPICAL ONCE
Status: COMPLETED | OUTPATIENT
Start: 2023-12-07 | End: 2023-12-07

## 2023-12-07 RX ORDER — HYDRALAZINE HYDROCHLORIDE 25 MG/1
25 TABLET, FILM COATED ORAL ONCE
Status: COMPLETED | OUTPATIENT
Start: 2023-12-07 | End: 2023-12-07

## 2023-12-07 RX ORDER — LOSARTAN POTASSIUM 25 MG/1
25 TABLET ORAL DAILY
Status: DISCONTINUED | OUTPATIENT
Start: 2023-12-07 | End: 2023-12-08

## 2023-12-07 RX ADMIN — SODIUM CHLORIDE: 9 INJECTION, SOLUTION INTRAVENOUS at 09:12

## 2023-12-07 RX ADMIN — HYDRALAZINE HYDROCHLORIDE 25 MG: 25 TABLET, FILM COATED ORAL at 05:12

## 2023-12-07 RX ADMIN — MEMANTINE HYDROCHLORIDE 5 MG: 5 TABLET ORAL at 09:12

## 2023-12-07 RX ADMIN — POTASSIUM CHLORIDE 40 MEQ: 1500 TABLET, EXTENDED RELEASE ORAL at 09:12

## 2023-12-07 RX ADMIN — MUPIROCIN: 20 OINTMENT TOPICAL at 08:12

## 2023-12-07 RX ADMIN — HYDRALAZINE HYDROCHLORIDE 50 MG: 25 TABLET, FILM COATED ORAL at 10:12

## 2023-12-07 RX ADMIN — SENNOSIDES AND DOCUSATE SODIUM 1 TABLET: 8.6; 5 TABLET ORAL at 09:12

## 2023-12-07 RX ADMIN — MEMANTINE HYDROCHLORIDE 5 MG: 5 TABLET ORAL at 08:12

## 2023-12-07 RX ADMIN — QUETIAPINE FUMARATE 25 MG: 25 TABLET ORAL at 08:12

## 2023-12-07 RX ADMIN — Medication 400 MG: at 09:12

## 2023-12-07 RX ADMIN — LABETALOL HYDROCHLORIDE 10 MG: 5 INJECTION, SOLUTION INTRAVENOUS at 08:12

## 2023-12-07 RX ADMIN — LOSARTAN POTASSIUM 25 MG: 25 TABLET, FILM COATED ORAL at 09:12

## 2023-12-07 RX ADMIN — SENNOSIDES AND DOCUSATE SODIUM 1 TABLET: 8.6; 5 TABLET ORAL at 08:12

## 2023-12-07 RX ADMIN — MUPIROCIN: 20 OINTMENT TOPICAL at 09:12

## 2023-12-07 RX ADMIN — HYDRALAZINE HYDROCHLORIDE 50 MG: 25 TABLET, FILM COATED ORAL at 02:12

## 2023-12-07 RX ADMIN — HEPARIN SODIUM 5000 UNITS: 5000 INJECTION INTRAVENOUS; SUBCUTANEOUS at 05:12

## 2023-12-07 RX ADMIN — HYDRALAZINE HYDROCHLORIDE 25 MG: 25 TABLET, FILM COATED ORAL at 09:12

## 2023-12-07 RX ADMIN — HEPARIN SODIUM 5000 UNITS: 5000 INJECTION INTRAVENOUS; SUBCUTANEOUS at 10:12

## 2023-12-07 RX ADMIN — HEPARIN SODIUM 5000 UNITS: 5000 INJECTION INTRAVENOUS; SUBCUTANEOUS at 02:12

## 2023-12-07 NOTE — NURSING
Nurses Note -- 4 Eyes      12/6/2023   6:56 PM      Skin assessed during: Transfer      [x] No Altered Skin Integrity Present    []Prevention Measures Documented      [] Yes- Altered Skin Integrity Present or Discovered   [] LDA Added if Not in Epic (Describe Wound)   [] New Altered Skin Integrity was Present on Admit and Documented in LDA   [] Wound Image Taken    Wound Care Consulted? No    Attending Nurse:  Ju Peoples RN/Staff Member:   Samara

## 2023-12-07 NOTE — PLAN OF CARE
Problem: Fall Injury Risk  Goal: Absence of Fall and Fall-Related Injury  Outcome: Ongoing, Progressing     Problem: Restraint, Nonbehavioral (Nonviolent)  Goal: Absence of Harm or Injury  Outcome: Ongoing, Progressing     Problem: Infection  Goal: Absence of Infection Signs and Symptoms  Outcome: Ongoing, Progressing     Problem: Adult Inpatient Plan of Care  Goal: Plan of Care Review  Outcome: Ongoing, Progressing  Goal: Patient-Specific Goal (Individualized)  Outcome: Ongoing, Progressing  Goal: Absence of Hospital-Acquired Illness or Injury  Outcome: Ongoing, Progressing  Goal: Optimal Comfort and Wellbeing  Outcome: Ongoing, Progressing  Goal: Readiness for Transition of Care  Outcome: Ongoing, Progressing     Problem: Adjustment to Illness (Stroke, Hemorrhagic)  Goal: Optimal Coping  Outcome: Ongoing, Progressing     Problem: Bowel Elimination Impaired (Stroke, Hemorrhagic)  Goal: Effective Bowel Elimination  Outcome: Ongoing, Progressing     Problem: Cerebral Tissue Perfusion (Stroke, Hemorrhagic)  Goal: Optimal Cerebral Tissue Perfusion  Outcome: Ongoing, Progressing     Problem: Cognitive Impairment (Stroke, Hemorrhagic)  Goal: Optimal Cognitive Function  Outcome: Ongoing, Progressing     Problem: Communication Impairment (Stroke, Hemorrhagic)  Goal: Effective Communication Skills  Outcome: Ongoing, Progressing     Problem: Functional Ability Impaired (Stroke, Hemorrhagic)  Goal: Optimal Functional Ability  Outcome: Ongoing, Progressing     Problem: Pain (Stroke, Hemorrhagic)  Goal: Acceptable Pain Control  Outcome: Ongoing, Progressing     Problem: Respiratory Compromise (Stroke, Hemorrhagic)  Goal: Effective Oxygenation and Ventilation  Outcome: Ongoing, Progressing     Problem: Sensorimotor Impairment (Stroke, Hemorrhagic)  Goal: Improved Sensorimotor Function  Outcome: Ongoing, Progressing     Problem: Swallowing Impairment (Stroke, Hemorrhagic)  Goal: Optimal Eating and Swallowing Without  Aspiration  Outcome: Ongoing, Progressing     Problem: Urinary Elimination Impaired (Stroke, Hemorrhagic)  Goal: Effective Urinary Elimination  Outcome: Ongoing, Progressing     Problem: Impaired Wound Healing  Goal: Optimal Wound Healing  Outcome: Ongoing, Progressing     Problem: Skin Injury Risk Increased  Goal: Skin Health and Integrity  Outcome: Ongoing, Progressing    POC reviewed with patient at bedside. NADN. BP elevated early on in shift. Pt medicated with scheduled hs meds and prn Labetalol, receptive to medication intervention. Migel hand mitten restraints in place.Tolerating po meds crushed in pudding and Dysphagia minced and moist diet well. Rotation therapy in place, turning and repositioning pt q2h. Bed alarm on, Telesitter at bedside. Pending SNF placement once medically cleared, current poc ongoing.

## 2023-12-07 NOTE — CARE UPDATE
Blood cultures from 12/3 growing Rhizobium spp. Discussed with lab, who will need to try to re-grow from culture to verify that this is an accurate result: this process should take ~24 hours. Further discussed with ID, as this microbe is rare. Given lack of systemic symptoms and lack of lab results reflecting ongoing infection, will put off reculturing until tomorrow. If Rhizobium is actually present, will reculture.
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
## 2023-12-07 NOTE — ASSESSMENT & PLAN NOTE
-Due to stroke and noted on imaging.  4th ventricle compression specifically noted.    -The patient is admitted to [facility] for close monitoring, hourly neuro checks.  -Neurosurgery consulted by primary team  -Likely etiology is hypertension  -Repeat CTH stable

## 2023-12-07 NOTE — NURSING
Nurses Note -- 4 Eyes      12/7/2023   12:28 AM      Skin assessed during: Q Shift Change      [] No Altered Skin Integrity Present    []Prevention Measures Documented      [x] Yes- Altered Skin Integrity Present or Discovered   [] LDA Added if Not in Epic (Describe Wound)   [x] New Altered Skin Integrity was Present on Admit and Documented in LDA   [] Wound Image Taken    Wound Care Consulted? Yes    Attending Nurse:  Tavon Peoples RN/Staff Member:   RAUL Dodd

## 2023-12-07 NOTE — SUBJECTIVE & OBJECTIVE
Neurologic Chief Complaint: ICH    Subjective:     Interval History: stepped down from NPU; creatinine down-trending; mild hypokalemia, replaced; BP elevated, started losartan, but will run a liter of fluids slowly given slight increase in creatinine in setting of poor po intake; mentation waxes and wanes, likely reflecting delirium; starting seroquel; pending mri brain w/swi; blood cultures growing rhizobacter as well, unsure if this is contaminant, will discuss w/ID service.    HPI, Past Medical, Family, and Social History remains the same as documented in the initial encounter.     Review of Systems   Unable to perform ROS: Dementia     Scheduled Meds:   heparin (porcine)  5,000 Units Subcutaneous Q8H    hydrALAZINE  50 mg Oral Q8H    losartan  25 mg Oral Daily    memantine  5 mg Oral BID    mupirocin   Nasal BID    QUEtiapine  25 mg Oral QHS    senna-docusate 8.6-50 mg  1 tablet Oral BID     Continuous Infusions:   sodium chloride 0.9% 100 mL/hr at 12/07/23 0942       PRN Meds:labetalol, ondansetron    Objective:     Vital Signs (Most Recent):  Temp: 97.7 °F (36.5 °C) (12/07/23 1157)  Pulse: 75 (12/07/23 1157)  Resp: 18 (12/07/23 1157)  BP: (!) 142/95 (12/07/23 1157)  SpO2: 99 % (12/07/23 1157)  BP Location: Right arm    Vital Signs Range (Last 24H):  Temp:  [97.3 °F (36.3 °C)-98.1 °F (36.7 °C)]   Pulse:  [68-89]   Resp:  [13-18]   BP: (130-208)/()   SpO2:  [85 %-100 %]   BP Location: Right arm       Physical Exam  Vitals and nursing note reviewed.   Constitutional:       Appearance: He is ill-appearing.   HENT:      Head: Normocephalic and atraumatic.      Mouth/Throat:      Mouth: Mucous membranes are dry.   Cardiovascular:      Rate and Rhythm: Normal rate and regular rhythm.   Pulmonary:      Effort: Pulmonary effort is normal.   Abdominal:      Palpations: Abdomen is soft.   Musculoskeletal:      Right lower leg: No edema.      Left lower leg: No edema.   Skin:     General: Skin is dry.    Neurological:      Mental Status: He is alert.              Neurological Exam:   LOC: alert  Attention Span: poor  Language: No aphasia  Articulation: No dysarthria  Orientation: oriented to self only  Visual Fields: limited by patient being uncooperative with exam; does not appear to blink to threat when tested  EOM (CN III, IV, VI): Full/intact  Pupils (CN II, III): PERRL  Facial Movement (CN VII): right sided facial droop  Motor: Arm left  Paresis: 4+/5  Leg left  Paresis: 4+/5  Arm right  Paresis: 4+/5  Leg right Paresis: 4+/5  Cerebellum: difficult to test due to patient's lack of cooperativeness with exam  Localizes pain throughout all four extremities    Laboratory:  BMP:   Recent Labs   Lab 12/07/23  0306      K 3.1*   CL 99   CO2 31*   BUN 24*   CREATININE 1.1   CALCIUM 8.8       CBC:   Recent Labs   Lab 12/07/23  0306   WBC 5.27   RBC 3.54*   HGB 10.6*   HCT 32.5*      MCV 92   MCH 29.9   MCHC 32.6       Lipid Panel:   Recent Labs   Lab 12/04/23  0359   CHOL 175   LDLCALC 105.8   HDL 58   TRIG 56       Hgb A1C:   Recent Labs   Lab 12/04/23  0359   HGBA1C 4.8       TSH:   Recent Labs   Lab 12/04/23  0359   TSH 5.463*         Diagnostic Results     Brain Imaging   CT Head 12/3/23  1. Left cerebellar intraparenchymal hemorrhage with intraventricular extension into the 4th ventricle.  2. No acute fracture or traumatic dislocation of the cervical spine.  3. Paranasal sinus disease.  4. Degenerative changes of the cervical spine most pronounced at C5-C6 with moderate spinal canal stenosis and severe bilateral neural foraminal narrowing.    CT Head 12/4/23: 4:01  Redemonstration of left cerebellar hemorrhage, unchanged in size and configuration from prior exam with continued interventricular extension.  No significant interval detrimental change compared to prior head CT.     CT Head 12/4/23: 13:47  Similar left cerebellar intraparenchymal hemorrhage with extension into the 4th ventricle.      Vessel Imaging   CTA Head 12/5/23  Stable left cerebellar hemorrhage, with mild extension into the 4th ventricle. Scattered mild intracranial atherosclerosis with no evidence of aneurysm, significant stenosis, or occlusion. Atherosclerosis about the visualized extracranial vasculature, noting potential focal ulceration about the distal left cervical internal carotid artery.    Cardiac Imaging   Echo 12/5/23    Left Ventricle: The left ventricle is normal in size. Normal wall thickness. There is concentric remodeling. Normal wall motion. There is normal systolic function with a visually estimated ejection fraction of 60 - 65%. Diastolic function cannot be reliably determined in the presence of mitral valve disease.    Right Ventricle: Normal right ventricular cavity size. Wall thickness is normal. Right ventricle wall motion  is normal. Systolic function is normal.    Aortic Valve: The aortic valve is a trileaflet valve. There is mild aortic valve sclerosis. There is annular calcification present. There is mild aortic regurgitation.    Mitral Valve: There is moderate mitral annular calcification present. There is normal leaflet mobility. There is systolic anterior motion of the mitral valve. There is moderate stenosis. The mean pressure gradient across the mitral valve is 8 mmHg at a heart rate of 96 bpm. There is no significant regurgitation.    Tricuspid Valve: There is mild regurgitation.    Pulmonary Artery: The estimated pulmonary artery systolic pressure is 36 mmHg.    IVC/SVC: Normal venous pressure at 3 mmHg.    Pericardium: Left pleural effusion.

## 2023-12-08 PROBLEM — R41.0 DELIRIUM: Status: ACTIVE | Noted: 2023-12-08

## 2023-12-08 LAB
ALBUMIN SERPL BCP-MCNC: 2.8 G/DL (ref 3.5–5.2)
ALP SERPL-CCNC: 55 U/L (ref 55–135)
ALT SERPL W/O P-5'-P-CCNC: 13 U/L (ref 10–44)
ANION GAP SERPL CALC-SCNC: 8 MMOL/L (ref 8–16)
AST SERPL-CCNC: 29 U/L (ref 10–40)
BACTERIA BLD CULT: NORMAL
BASOPHILS # BLD AUTO: 0.02 K/UL (ref 0–0.2)
BASOPHILS NFR BLD: 0.4 % (ref 0–1.9)
BILIRUB SERPL-MCNC: 0.4 MG/DL (ref 0.1–1)
BUN SERPL-MCNC: 17 MG/DL (ref 8–23)
CALCIUM SERPL-MCNC: 8.7 MG/DL (ref 8.7–10.5)
CHLORIDE SERPL-SCNC: 104 MMOL/L (ref 95–110)
CO2 SERPL-SCNC: 27 MMOL/L (ref 23–29)
CREAT SERPL-MCNC: 1 MG/DL (ref 0.5–1.4)
DIFFERENTIAL METHOD: ABNORMAL
EOSINOPHIL # BLD AUTO: 0.1 K/UL (ref 0–0.5)
EOSINOPHIL NFR BLD: 1.5 % (ref 0–8)
ERYTHROCYTE [DISTWIDTH] IN BLOOD BY AUTOMATED COUNT: 16.2 % (ref 11.5–14.5)
EST. GFR  (NO RACE VARIABLE): >60 ML/MIN/1.73 M^2
GLUCOSE SERPL-MCNC: 75 MG/DL (ref 70–110)
HCT VFR BLD AUTO: 30.9 % (ref 40–54)
HGB BLD-MCNC: 10.4 G/DL (ref 14–18)
IMM GRANULOCYTES # BLD AUTO: 0.01 K/UL (ref 0–0.04)
IMM GRANULOCYTES NFR BLD AUTO: 0.2 % (ref 0–0.5)
LYMPHOCYTES # BLD AUTO: 1.4 K/UL (ref 1–4.8)
LYMPHOCYTES NFR BLD: 30.2 % (ref 18–48)
MAGNESIUM SERPL-MCNC: 2 MG/DL (ref 1.6–2.6)
MCH RBC QN AUTO: 31.4 PG (ref 27–31)
MCHC RBC AUTO-ENTMCNC: 33.7 G/DL (ref 32–36)
MCV RBC AUTO: 93 FL (ref 82–98)
MONOCYTES # BLD AUTO: 0.4 K/UL (ref 0.3–1)
MONOCYTES NFR BLD: 7.8 % (ref 4–15)
NEUTROPHILS # BLD AUTO: 2.8 K/UL (ref 1.8–7.7)
NEUTROPHILS NFR BLD: 59.9 % (ref 38–73)
NRBC BLD-RTO: 0 /100 WBC
PHOSPHATE SERPL-MCNC: 2.5 MG/DL (ref 2.7–4.5)
PLATELET # BLD AUTO: 154 K/UL (ref 150–450)
PMV BLD AUTO: 10.3 FL (ref 9.2–12.9)
POTASSIUM SERPL-SCNC: 3.5 MMOL/L (ref 3.5–5.1)
PROT SERPL-MCNC: 6.8 G/DL (ref 6–8.4)
RBC # BLD AUTO: 3.31 M/UL (ref 4.6–6.2)
SODIUM SERPL-SCNC: 139 MMOL/L (ref 136–145)
WBC # BLD AUTO: 4.63 K/UL (ref 3.9–12.7)

## 2023-12-08 PROCEDURE — 84100 ASSAY OF PHOSPHORUS: CPT

## 2023-12-08 PROCEDURE — 36415 COLL VENOUS BLD VENIPUNCTURE: CPT

## 2023-12-08 PROCEDURE — 85025 COMPLETE CBC W/AUTO DIFF WBC: CPT | Performed by: NURSE PRACTITIONER

## 2023-12-08 PROCEDURE — 25000003 PHARM REV CODE 250: Performed by: PSYCHIATRY & NEUROLOGY

## 2023-12-08 PROCEDURE — 97112 NEUROMUSCULAR REEDUCATION: CPT

## 2023-12-08 PROCEDURE — 97530 THERAPEUTIC ACTIVITIES: CPT

## 2023-12-08 PROCEDURE — 80053 COMPREHEN METABOLIC PANEL: CPT | Performed by: NURSE PRACTITIONER

## 2023-12-08 PROCEDURE — 36415 COLL VENOUS BLD VENIPUNCTURE: CPT | Performed by: NURSE PRACTITIONER

## 2023-12-08 PROCEDURE — 25000003 PHARM REV CODE 250

## 2023-12-08 PROCEDURE — 25000003 PHARM REV CODE 250: Performed by: PHYSICIAN ASSISTANT

## 2023-12-08 PROCEDURE — 63600175 PHARM REV CODE 636 W HCPCS

## 2023-12-08 PROCEDURE — 99233 SBSQ HOSP IP/OBS HIGH 50: CPT | Mod: GC,,, | Performed by: STUDENT IN AN ORGANIZED HEALTH CARE EDUCATION/TRAINING PROGRAM

## 2023-12-08 PROCEDURE — 25000003 PHARM REV CODE 250: Performed by: REGISTERED NURSE

## 2023-12-08 PROCEDURE — 87040 BLOOD CULTURE FOR BACTERIA: CPT | Mod: 59

## 2023-12-08 PROCEDURE — 63600175 PHARM REV CODE 636 W HCPCS: Performed by: REGISTERED NURSE

## 2023-12-08 PROCEDURE — 92526 ORAL FUNCTION THERAPY: CPT

## 2023-12-08 PROCEDURE — 99233 PR SUBSEQUENT HOSPITAL CARE,LEVL III: ICD-10-PCS | Mod: GC,,, | Performed by: STUDENT IN AN ORGANIZED HEALTH CARE EDUCATION/TRAINING PROGRAM

## 2023-12-08 PROCEDURE — 83735 ASSAY OF MAGNESIUM: CPT

## 2023-12-08 PROCEDURE — 11000001 HC ACUTE MED/SURG PRIVATE ROOM

## 2023-12-08 PROCEDURE — 25000003 PHARM REV CODE 250: Performed by: NURSE PRACTITIONER

## 2023-12-08 RX ORDER — LOSARTAN POTASSIUM 50 MG/1
50 TABLET ORAL DAILY
Status: DISCONTINUED | OUTPATIENT
Start: 2023-12-08 | End: 2023-12-13

## 2023-12-08 RX ORDER — NIFEDIPINE 30 MG/1
30 TABLET, EXTENDED RELEASE ORAL DAILY
Status: DISCONTINUED | OUTPATIENT
Start: 2023-12-08 | End: 2023-12-09

## 2023-12-08 RX ORDER — QUETIAPINE FUMARATE 25 MG/1
50 TABLET, FILM COATED ORAL NIGHTLY
Status: DISCONTINUED | OUTPATIENT
Start: 2023-12-08 | End: 2023-12-15 | Stop reason: HOSPADM

## 2023-12-08 RX ORDER — HYDROXYZINE HYDROCHLORIDE 10 MG/1
10 TABLET, FILM COATED ORAL NIGHTLY PRN
Status: DISCONTINUED | OUTPATIENT
Start: 2023-12-08 | End: 2023-12-13

## 2023-12-08 RX ORDER — HYDROXYZINE HYDROCHLORIDE 10 MG/1
10 TABLET, FILM COATED ORAL NIGHTLY PRN
Status: DISCONTINUED | OUTPATIENT
Start: 2023-12-08 | End: 2023-12-08

## 2023-12-08 RX ORDER — ACETAMINOPHEN 325 MG/1
650 TABLET ORAL EVERY 6 HOURS PRN
Status: DISCONTINUED | OUTPATIENT
Start: 2023-12-08 | End: 2023-12-15 | Stop reason: HOSPADM

## 2023-12-08 RX ORDER — POTASSIUM CHLORIDE 20 MEQ/1
20 TABLET, EXTENDED RELEASE ORAL ONCE
Status: DISCONTINUED | OUTPATIENT
Start: 2023-12-08 | End: 2023-12-08

## 2023-12-08 RX ORDER — TALC
6 POWDER (GRAM) TOPICAL NIGHTLY
Status: DISCONTINUED | OUTPATIENT
Start: 2023-12-08 | End: 2023-12-15 | Stop reason: HOSPADM

## 2023-12-08 RX ADMIN — HYDRALAZINE HYDROCHLORIDE 50 MG: 25 TABLET, FILM COATED ORAL at 05:12

## 2023-12-08 RX ADMIN — ACETAMINOPHEN 650 MG: 325 TABLET ORAL at 09:12

## 2023-12-08 RX ADMIN — MEMANTINE HYDROCHLORIDE 5 MG: 5 TABLET ORAL at 10:12

## 2023-12-08 RX ADMIN — MUPIROCIN: 20 OINTMENT TOPICAL at 10:12

## 2023-12-08 RX ADMIN — LABETALOL HYDROCHLORIDE 10 MG: 5 INJECTION, SOLUTION INTRAVENOUS at 09:12

## 2023-12-08 RX ADMIN — MUPIROCIN: 20 OINTMENT TOPICAL at 09:12

## 2023-12-08 RX ADMIN — MEMANTINE HYDROCHLORIDE 5 MG: 5 TABLET ORAL at 09:12

## 2023-12-08 RX ADMIN — HEPARIN SODIUM 5000 UNITS: 5000 INJECTION INTRAVENOUS; SUBCUTANEOUS at 05:12

## 2023-12-08 RX ADMIN — Medication 6 MG: at 10:12

## 2023-12-08 RX ADMIN — QUETIAPINE FUMARATE 50 MG: 25 TABLET ORAL at 10:12

## 2023-12-08 RX ADMIN — HEPARIN SODIUM 5000 UNITS: 5000 INJECTION INTRAVENOUS; SUBCUTANEOUS at 10:12

## 2023-12-08 RX ADMIN — SENNOSIDES AND DOCUSATE SODIUM 1 TABLET: 8.6; 5 TABLET ORAL at 10:12

## 2023-12-08 RX ADMIN — LOSARTAN POTASSIUM 50 MG: 50 TABLET, FILM COATED ORAL at 09:12

## 2023-12-08 RX ADMIN — SENNOSIDES AND DOCUSATE SODIUM 1 TABLET: 8.6; 5 TABLET ORAL at 09:12

## 2023-12-08 RX ADMIN — HEPARIN SODIUM 5000 UNITS: 5000 INJECTION INTRAVENOUS; SUBCUTANEOUS at 01:12

## 2023-12-08 RX ADMIN — POTASSIUM BICARBONATE 20 MEQ: 391 TABLET, EFFERVESCENT ORAL at 01:12

## 2023-12-08 RX ADMIN — NIFEDIPINE 30 MG: 30 TABLET, FILM COATED, EXTENDED RELEASE ORAL at 11:12

## 2023-12-08 NOTE — PLAN OF CARE
Problem: Fall Injury Risk  Goal: Absence of Fall and Fall-Related Injury  Outcome: Ongoing, Progressing  Intervention: Promote Injury-Free Environment  Flowsheets (Taken 12/7/2023 1817)  Safety Promotion/Fall Prevention:   bed alarm set   assistive device/personal item within reach     Problem: Skin Injury Risk Increased  Goal: Skin Health and Integrity  Outcome: Ongoing, Progressing
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
## 2023-12-08 NOTE — SUBJECTIVE & OBJECTIVE
Neurologic Chief Complaint: ICH    Subjective:     Interval History: neuro exam stable; up all night; increased seroquel to 50; BP elevated, increased losartan, added nifedipine, and dc'ed hydralazine; mri brain pending, radiology concerned about BBs in patient's leg seen on x-ray; waiting on micro lab regrowing blood cultures for Rhizobium, which has been verified, will re-culture.    HPI, Past Medical, Family, and Social History remains the same as documented in the initial encounter.     Review of Systems   Unable to perform ROS: Dementia     Scheduled Meds:   heparin (porcine)  5,000 Units Subcutaneous Q8H    losartan  50 mg Oral Daily    melatonin  6 mg Oral Nightly    memantine  5 mg Oral BID    mupirocin   Nasal BID    NIFEdipine  30 mg Oral Daily    QUEtiapine  50 mg Oral QHS    senna-docusate 8.6-50 mg  1 tablet Oral BID     Continuous Infusions:        PRN Meds:acetaminophen, hydrOXYzine HCL, labetalol, ondansetron    Objective:     Vital Signs (Most Recent):  Temp: 97.8 °F (36.6 °C) (12/08/23 1135)  Pulse: 82 (12/08/23 1507)  Resp: 18 (12/08/23 1135)  BP: 112/70 (12/08/23 1135)  SpO2: (!) 90 % (12/08/23 1135)  BP Location: Left arm    Vital Signs Range (Last 24H):  Temp:  [97.6 °F (36.4 °C)-98.4 °F (36.9 °C)]   Pulse:  [64-90]   Resp:  [18-20]   BP: (112-194)/()   SpO2:  [90 %-100 %]   BP Location: Left arm       Physical Exam  Vitals and nursing note reviewed.   Constitutional:       Appearance: He is ill-appearing.   HENT:      Head: Normocephalic and atraumatic.      Mouth/Throat:      Mouth: Mucous membranes are dry.   Cardiovascular:      Rate and Rhythm: Normal rate and regular rhythm.   Pulmonary:      Effort: Pulmonary effort is normal.   Abdominal:      Palpations: Abdomen is soft.   Musculoskeletal:      Right lower leg: No edema.      Left lower leg: No edema.   Skin:     General: Skin is dry.   Neurological:      Mental Status: He is alert.              Neurological Exam:   LOC:  alert  Attention Span: poor  Language: No aphasia  Articulation: No dysarthria  Orientation: oriented to self only  Visual Fields: limited by patient being uncooperative with exam; does not appear to blink to threat when tested  EOM (CN III, IV, VI): Full/intact  Pupils (CN II, III): PERRL  Facial Movement (CN VII): right sided facial droop  Motor: Arm left  Paresis: 4+/5  Leg left  Paresis: 4+/5  Arm right  Paresis: 4+/5  Leg right Paresis: 4+/5  Cerebellum: difficult to test due to patient's lack of cooperativeness with exam  Localizes pain throughout all four extremities    Laboratory:  BMP:   Recent Labs   Lab 12/08/23  0444      K 3.5      CO2 27   BUN 17   CREATININE 1.0   CALCIUM 8.7       CBC:   Recent Labs   Lab 12/08/23 0444   WBC 4.63   RBC 3.31*   HGB 10.4*   HCT 30.9*      MCV 93   MCH 31.4*   MCHC 33.7       Lipid Panel:   Recent Labs   Lab 12/04/23  0359   CHOL 175   LDLCALC 105.8   HDL 58   TRIG 56       Hgb A1C:   Recent Labs   Lab 12/04/23  0359   HGBA1C 4.8       TSH:   Recent Labs   Lab 12/04/23  0359   TSH 5.463*         Diagnostic Results     Brain Imaging   CT Head 12/3/23  1. Left cerebellar intraparenchymal hemorrhage with intraventricular extension into the 4th ventricle.  2. No acute fracture or traumatic dislocation of the cervical spine.  3. Paranasal sinus disease.  4. Degenerative changes of the cervical spine most pronounced at C5-C6 with moderate spinal canal stenosis and severe bilateral neural foraminal narrowing.    CT Head 12/4/23: 4:01  Redemonstration of left cerebellar hemorrhage, unchanged in size and configuration from prior exam with continued interventricular extension.  No significant interval detrimental change compared to prior head CT.     CT Head 12/4/23: 13:47  Similar left cerebellar intraparenchymal hemorrhage with extension into the 4th ventricle.     Vessel Imaging   CTA Head 12/5/23  Stable left cerebellar hemorrhage, with mild extension into  the 4th ventricle. Scattered mild intracranial atherosclerosis with no evidence of aneurysm, significant stenosis, or occlusion. Atherosclerosis about the visualized extracranial vasculature, noting potential focal ulceration about the distal left cervical internal carotid artery.    Cardiac Imaging   Echo 12/5/23    Left Ventricle: The left ventricle is normal in size. Normal wall thickness. There is concentric remodeling. Normal wall motion. There is normal systolic function with a visually estimated ejection fraction of 60 - 65%. Diastolic function cannot be reliably determined in the presence of mitral valve disease.    Right Ventricle: Normal right ventricular cavity size. Wall thickness is normal. Right ventricle wall motion  is normal. Systolic function is normal.    Aortic Valve: The aortic valve is a trileaflet valve. There is mild aortic valve sclerosis. There is annular calcification present. There is mild aortic regurgitation.    Mitral Valve: There is moderate mitral annular calcification present. There is normal leaflet mobility. There is systolic anterior motion of the mitral valve. There is moderate stenosis. The mean pressure gradient across the mitral valve is 8 mmHg at a heart rate of 96 bpm. There is no significant regurgitation.    Tricuspid Valve: There is mild regurgitation.    Pulmonary Artery: The estimated pulmonary artery systolic pressure is 36 mmHg.    IVC/SVC: Normal venous pressure at 3 mmHg.    Pericardium: Left pleural effusion.

## 2023-12-08 NOTE — PLAN OF CARE
Problem: Fall Injury Risk  Goal: Absence of Fall and Fall-Related Injury  Outcome: Ongoing, Progressing     Problem: Restraint, Nonbehavioral (Nonviolent)  Goal: Absence of Harm or Injury  Outcome: Ongoing, Progressing     Problem: Adult Inpatient Plan of Care  Goal: Absence of Hospital-Acquired Illness or Injury  Outcome: Ongoing, Progressing     Problem: Adjustment to Illness (Stroke, Hemorrhagic)  Goal: Optimal Coping  Outcome: Ongoing, Progressing     Problem: Cognitive Impairment (Stroke, Hemorrhagic)  Goal: Optimal Cognitive Function  Outcome: Ongoing, Progressing     Problem: Communication Impairment (Stroke, Hemorrhagic)  Goal: Effective Communication Skills  Outcome: Ongoing, Progressing
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
## 2023-12-08 NOTE — PLAN OF CARE
Problem: Fall Injury Risk  Goal: Absence of Fall and Fall-Related Injury  Outcome: Ongoing, Progressing     Problem: Restraint, Nonbehavioral (Nonviolent)  Goal: Absence of Harm or Injury  Outcome: Ongoing, Progressing     Problem: Infection  Goal: Absence of Infection Signs and Symptoms  Outcome: Ongoing, Progressing     Problem: Cerebral Tissue Perfusion (Stroke, Hemorrhagic)  Goal: Optimal Cerebral Tissue Perfusion  Outcome: Ongoing, Progressing     Problem: Cognitive Impairment (Stroke, Hemorrhagic)  Goal: Optimal Cognitive Function  Outcome: Ongoing, Progressing

## 2023-12-08 NOTE — PT/OT/SLP PROGRESS
Physical Therapy Co-Treatment    Patient Name: Sheng Virgen   MRN: [id number]    Co-treatment performed for this visit due to patient need for two skilled therapists to ensure patient and staff safety and to accommodate for patient activity tolerance/pain management   Recommendations:     Discharge Recommendations: Moderate Intensity Therapy  Discharge Equipment Recommendations: hospital bed, lift device, wheelchair  Barriers to discharge: Increased level of assist and Decreased caregiver support    Assessment:     Sheng Virgen is a 83 y.o. 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 He presents with the following impairments/functional limitations: weakness, impaired endurance, impaired self care skills, impaired functional mobility, gait instability, impaired balance, visual deficits, impaired cognition, impaired coordination, decreased upper extremity function, decreased lower extremity function, decreased safety awareness, abnormal tone, impaired fine motor. Pt with fair tolerance to therapy session on this date. Pt with decreased agitation as compared to previous session, but with significant lethargy and limited participation in treatment. Pt continues to require total assistance with all functional mobility at this time 2/2 AMS, poor motor initiation, decreased command following, and poor activity tolerance. Pt would continue to benefit from skilled acute PT in order to address current deficits and progress functional mobility.     Rehab Prognosis: Fair; patient continues to benefit from acute skilled PT services to address these deficits and reach maximum level of function.  Recent Surgery: * No surgery found *      Plan:     During this hospitalization, patient to be seen 3 x/week to address the identified rehab impairments via gait training, therapeutic activities, therapeutic exercises, neuromuscular re-education and progress toward the following  "goals:    Plan of Care Expires:  01/04/24    Subjective     Chief Complaint: None verbalized  Patient/Family Comments/Goals: "Patricio Virgen" ; Pt's daughter: "So how has it been going, what are we working on?" ; Pt's son: "So was it like a small stroke?"  Pain/Comfort:  Pain Rating 1: 0/10    Objective:     Communicated with RN prior to session. Patient found HOB elevated with telemetry, bed alarm, Condom Catheter (Avasys, posey mitts) upon PT entry to room.     General Precautions: Standard, fall, aspiration, seizure  Orthopedic Precautions: N/A  Braces: N/A    Functional Mobility:  Bed Mobility:  Verbal and tactile cues for sequencing and technique  Rolling Left:  total assistance  Rolling Right: total assistance  Scooting: total assistance of 2 persons  Supine to Sit: total assistance of 2 persons for LE management and trunk management  Sit to Supine: total assistance of 2 persons for LE management and trunk management  Transfers:    Sit to Stand: x3 reps from EOB; dependence of 2 persons with no AD with cues for hand placement and foot placement  Verbal cues for increased hip extension and upright posture  Minimal hip clearance noted 2/2 pt resisting STS  Balance:   Static Sitting: Poor, able to maintain for 5 minute(s) with total assistance 2/2 multidirectional lean  Verbal cues for upright posture, reduction of leans, improved use of BUE for support,and command following   Dynamic Sitting: Poor: Patient unable to accept challenge or move without loss of balance, total assistance  1x15 PROM to BLE while seated at EOB  Static Standing: Poor, able to maintain for 10 seconds with dependence of 2 persons  Verbal cues for upright posture and increased hip extension  Dynamic Standing: not assessed this visit    AM-PAC 6 CLICK MOBILITY  Turning over in bed (including adjusting bedclothes, sheets and blankets)?: 2  Sitting down on and standing up from a chair with arms (e.g., wheelchair, bedside commode, etc.): 1  Moving " from lying on back to sitting on the side of the bed?: 1  Moving to and from a bed to a chair (including a wheelchair)?: 1  Need to walk in hospital room?: 1  Climbing 3-5 steps with a railing?: 1  Basic Mobility Total Score: 7     Therapeutic Activities and Exercises:  Patient educated on role of acute care PT and PT POC, safety while in hospital including calling nurse for mobility, and call light usage  Answered all questions within PT scope of practice and addressed functional mobility concerns.  Pt educated on importance of maximal participation in therapy in order to reduce negative effects of prolonged sedentary positioning.   Pt provided with daily orientation.    Patient left left sidelying with all lines intact, call button in reach, RN notified, and bed alarm on.    GOALS:   Multidisciplinary Problems       Physical Therapy Goals          Problem: Physical Therapy    Goal Priority Disciplines Outcome Goal Variances Interventions   Physical Therapy Goal     PT, PT/OT Ongoing, Progressing     Description: Goals to be met by: 2024     Patient will increase functional independence with mobility by performin. Supine to sit with Moderate Assistance  2. Sit to supine with Moderate Assistance  3. Sit to stand transfer with Moderate Assistance  4. Bed to chair transfer with Moderate Assistance using LRAD  5. Gait  x 20 feet with Moderate Assistance using LRAD.   6. Sitting at edge of bed x10 minutes with Contact Guard Assistance  7. Lower extremity exercise program x15 reps per handout, with assistance as needed                         Time Tracking:     PT Received On: 23  PT Start Time: 1244     PT Stop Time: 1308  PT Total Time (min): 24 min     Billable Minutes: Therapeutic Activity 14 and Neuromuscular Re-education 10      Treatment Type: Treatment  PT/PTA: PT     Number of PTA visits since last PT visit: 0     2023

## 2023-12-08 NOTE — NURSING
Nurses Note -- 4 Eyes      12/8/2023   4:47 AM      Skin assessed during: Q Shift Change      [] No Altered Skin Integrity Present    []Prevention Measures Documented      [x] Yes- Altered Skin Integrity Present or Discovered   [] LDA Added if Not in Epic (Describe Wound)   [x] New Altered Skin Integrity was Present on Admit and Documented in LDA   [] Wound Image Taken    Wound Care Consulted? No    Attending Nurse:  Elder Peoples RN/Staff Member:  Ju

## 2023-12-08 NOTE — NURSING
Nurses Note -- 4 Eyes      12/8/2023   5:17 PM      Skin assessed during: Daily Assessment      [] No Altered Skin Integrity Present    []Prevention Measures Documented      [x] Yes- Altered Skin Integrity Present or Discovered   [] LDA Added if Not in Epic (Describe Wound)   [] New Altered Skin Integrity was Present on Admit and Documented in LDA   [] Wound Image Taken    Wound Care Consulted? No    Attending Nurse:  Juli Peoples RN/Staff Member:   RAUL Friedman

## 2023-12-08 NOTE — PT/OT/SLP PROGRESS
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  Pt had limited tolerance of session due to poor command following and minimal engagement.  He was oriented to person only.  Pt continues to require significant assistance with ADLs and mobility.  He presents with the following.  Performance deficits affecting function are weakness, impaired endurance, impaired self care skills, impaired functional mobility, gait instability, impaired balance, visual deficits, impaired cognition, decreased coordination, decreased upper extremity function, decreased lower extremity function, decreased safety awareness, abnormal tone, decreased ROM, impaired coordination, impaired fine motor.     Rehab Prognosis:  Fair; patient would benefit from acute skilled OT services to address these deficits and reach maximum level of function.       Plan:     Patient to be seen 3 x/week to address the above listed problems via self-care/home management, therapeutic exercises, therapeutic activities, neuromuscular re-education  Plan of Care Expires: 01/04/24  Plan of Care Reviewed with: patient, daughter    Subjective     Chief Complaint: none stated   Patient/Family Comments/goals: "Sheng Virgen." - when asked to state his name  Pain/Comfort:  Pain Rating 1: 0/10 (no pain reported)  Pain Rating Post-Intervention " 1: 0/10    Objective:     Communicated with: nurse and PT prior to session.  Patient found HOB elevated with telemetry, bed alarm, Condom Catheter, Other (comments) (Avasys camera, safety mitts) with his daughter present upon OT entry to room.    General Precautions: Standard, fall, aspiration, seizure    Orthopedic Precautions:N/A  Braces: N/A  Respiratory Status: Room air     Occupational Performance:     Bed Mobility:    Patient completed Rolling/Turning to Right and Left with total assistance  Patient completed Scooting/Bridging with total assistance and 2 persons  Patient completed Supine to Sit to the R with total assistance and 2 persons  Patient completed Sit to Supine with total assistance and 2 persons     Functional Mobility/Transfers:  Patient completed Sit <> Stand Transfer from EOB x 2 trials with dependence and of 2 persons with hand-held assist with minimal hip clearance     Activities of Daily Living:  Lower Body Dressing: total assistance to adjust non-skid socks while supine  Toileting: dependence on condom catheter    [facility] 6 Click ADL: 6    Treatment & Education:  - Pt's son entered the room and his daughter left the room during session. His son briefly stayed and then left.      - Pt required total assistance for sitting balance EOB due to multidirectional leaning.  Initially he leaned R, then L, and then anteriorly.  Writing therapist sat to pt's R for safety and provided tactile cueing to facilitate upright posture with minimal success.  Pt held onto therapist's knee with his R hand despite therapist placing it on the bed.  He then held onto the bed rail with his L hand while therapists were returning him to supine.    Pt and his family edu on role of OT, POC, safety when performing self care tasks, benefit of performing EOB/OOB activity, and safety when performing functional transfers and mobility.    - Self care tasks completed-- as noted above      Patient left left sidelying with all  lines intact, mitts donned, call button in reach, bed alarm on, and nurse notified    GOALS:   Multidisciplinary Problems       Occupational Therapy Goals          Problem: Occupational Therapy    Goal Priority Disciplines Outcome Interventions   Occupational Therapy Goal     OT, PT/OT Ongoing, Progressing    Description: Goals to be met by: 1/4/23     Patient will increase functional independence with ADLs by performing:    UE Dressing with Moderate Assistance.  LE Dressing with Moderate Assistance.  Grooming while seated with Moderate Assistance.  Toileting from bedside commode with Moderate Assistance for hygiene and clothing management.   Stand pivot transfers with Moderate Assistance.  Toilet transfer to bedside commode with Moderate Assistance.                         Time Tracking:     OT Date of Treatment: 12/08/23  OT Start Time: 1245  OT Stop Time: 1308  OT Total Time (min): 23 min    Billable Minutes:Therapeutic Activity 23 min    OT/IDA: OT     Number of IDA visits since last OT visit: 1 12/8/2023

## 2023-12-08 NOTE — PT/OT/SLP PROGRESS
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    Recommendations:                 General Recommendations:  Dysphagia therapy and Speech/language therapy  Diet recommendations:  Soft & Bite Sized Diet - IDDSI Level 6, Liquid Diet Level: Thin liquids - IDDSI Level 0   Aspiration Precautions: 1 bite/sip at a time, Alternating bites/sips, Assistance with meals, HOB to 90 degrees, Meds whole buried in puree, Monitor for s/s of aspiration, Remain upright 30 minutes post meal, Small bites/sips, and Standard aspiration precautions   General Precautions: Standard, aspiration  Communication strategies:  go to room if call light pushed    Assessment:     Sheng Virgen is a 83 y.o. male with an SLP diagnosis of Dysphagia.  Patient presents with a h/o dementia.    Subjective     "Im terrific" re: "How are you doing today?"    Daughter at bedside. She reports pt has been tolerating his minced/moist diet trays without overt signs of aspiration.     Pain/Comfort:  Pain Rating 1: 0/10  Pain Rating Post-Intervention 1: 0/10    Respiratory Status: Room air    Objective:     Has the patient been evaluated by SLP for swallowing?   Yes  Keep patient NPO? No       Patient seen for ongoing dysphagia and cognitive-linguistic therapy. Pt awake/alert this date. Pt followed simple commands given increased response time, mod-max cues, and repetition of instruction. Pt answered a few open-ended questions with appropriate phrase-length responses. HOB raised for safety precautions. Pt agreeable to participate in PO trials of thin liquids via straw sips and soft solids. Pt seen with 6 oz. of water and 1 peanut butter wafer cookie found at bedside. Pt seen with a few large, cylical sips of thin liquids, however no overt signs of aspiration noted. Pt with adequate labial seal, siphoning from the straw, timely mastication, and timely " bolus prep and transit. Pt observed to continue to chew following initial swallow, however no bolus noted in oral cavity. No overt signs of aspiration noted with softer solids. Pt safe for a diet advancement to Soft & Bite-Sized solids and thin liquids with standard aspiration precautions. Education provided re: role of SLP, diet recs, swallow precs, s/s aspiration and POC.  Pt and daughter verbalized understanding and agreement. Pt would benefit from ongoing education regarding diet recommendations and aspiration precautions as he has a h/o dementia.        Goals:   Multidisciplinary Problems       SLP Goals          Problem: SLP    Goal Priority Disciplines Outcome   SLP Goal     SLP Ongoing, Progressing   Description: Goals due 12/12  1.  Tolerate [facility] soft diet - level 5 with no s/s of aspiration  2.  Participate in speech langauge cognitive eval                       Plan:     Patient to be seen:  3 x/week   Plan of Care expires:  01/02/24  Plan of Care reviewed with:  patient, daughter   SLP Follow-Up:  Yes       Discharge recommendations:  Moderate Intensity Therapy   Barriers to Discharge:  Decreased Care Giver Support    Time Tracking:     SLP Treatment Date:   12/08/23  Speech Start Time:  0956  Speech Stop Time:  1008     Speech Total Time (min):  12 min    Billable Minutes: Treatment Swallowing Dysfunction 12    12/08/2023

## 2023-12-08 NOTE — PLAN OF CARE
CHW met with patient/family at bedside. Patient experience rounding completed and reviewed the following.     Do you know your discharge plan? Yes or No,    If yes, what is the plan?  Yes SNF    Have you discussed your needs and preferences with your SW/CM?  Yes     If you are discharging home, do you have help at home?  Yes    Do you think you will need help additional at home at discharge?  No     Do you currently have difficulty keeping up with bills, affording medicine or buying food?  No    Assigned SW/CM notified of any patient/family needs or concerns. Appropriate resources provided to address patient's needs.  Marco Antonio Adan CHW  Case Management  [phone]

## 2023-12-08 NOTE — ASSESSMENT & PLAN NOTE
-Due to stroke and noted on imaging.  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

## 2023-12-09 PROBLEM — R78.81 POSITIVE BLOOD CULTURE: Status: ACTIVE | Noted: 2023-12-09

## 2023-12-09 LAB
ALBUMIN SERPL BCP-MCNC: 2.7 G/DL (ref 3.5–5.2)
ALP SERPL-CCNC: 63 U/L (ref 55–135)
ALT SERPL W/O P-5'-P-CCNC: 13 U/L (ref 10–44)
ANION GAP SERPL CALC-SCNC: 6 MMOL/L (ref 8–16)
AST SERPL-CCNC: 31 U/L (ref 10–40)
BACTERIA BLD CULT: ABNORMAL
BASOPHILS # BLD AUTO: 0.02 K/UL (ref 0–0.2)
BASOPHILS NFR BLD: 0.4 % (ref 0–1.9)
BILIRUB SERPL-MCNC: 0.3 MG/DL (ref 0.1–1)
BUN SERPL-MCNC: 15 MG/DL (ref 8–23)
CALCIUM SERPL-MCNC: 8.8 MG/DL (ref 8.7–10.5)
CHLORIDE SERPL-SCNC: 103 MMOL/L (ref 95–110)
CO2 SERPL-SCNC: 29 MMOL/L (ref 23–29)
CREAT SERPL-MCNC: 1 MG/DL (ref 0.5–1.4)
DIFFERENTIAL METHOD: ABNORMAL
EOSINOPHIL # BLD AUTO: 0.1 K/UL (ref 0–0.5)
EOSINOPHIL NFR BLD: 1.5 % (ref 0–8)
ERYTHROCYTE [DISTWIDTH] IN BLOOD BY AUTOMATED COUNT: 16.6 % (ref 11.5–14.5)
EST. GFR  (NO RACE VARIABLE): >60 ML/MIN/1.73 M^2
GLUCOSE SERPL-MCNC: 90 MG/DL (ref 70–110)
HCT VFR BLD AUTO: 29.9 % (ref 40–54)
HGB BLD-MCNC: 10.1 G/DL (ref 14–18)
IMM GRANULOCYTES # BLD AUTO: 0.02 K/UL (ref 0–0.04)
IMM GRANULOCYTES NFR BLD AUTO: 0.4 % (ref 0–0.5)
LYMPHOCYTES # BLD AUTO: 1.4 K/UL (ref 1–4.8)
LYMPHOCYTES NFR BLD: 24.9 % (ref 18–48)
MAGNESIUM SERPL-MCNC: 2.1 MG/DL (ref 1.6–2.6)
MCH RBC QN AUTO: 30.4 PG (ref 27–31)
MCHC RBC AUTO-ENTMCNC: 33.8 G/DL (ref 32–36)
MCV RBC AUTO: 90 FL (ref 82–98)
MONOCYTES # BLD AUTO: 0.5 K/UL (ref 0.3–1)
MONOCYTES NFR BLD: 8.4 % (ref 4–15)
NEUTROPHILS # BLD AUTO: 3.5 K/UL (ref 1.8–7.7)
NEUTROPHILS NFR BLD: 64.4 % (ref 38–73)
NRBC BLD-RTO: 0 /100 WBC
PHOSPHATE SERPL-MCNC: 2.8 MG/DL (ref 2.7–4.5)
PLATELET # BLD AUTO: 161 K/UL (ref 150–450)
PMV BLD AUTO: 9.3 FL (ref 9.2–12.9)
POTASSIUM SERPL-SCNC: 3.6 MMOL/L (ref 3.5–5.1)
PROT SERPL-MCNC: 6.7 G/DL (ref 6–8.4)
RBC # BLD AUTO: 3.32 M/UL (ref 4.6–6.2)
SODIUM SERPL-SCNC: 138 MMOL/L (ref 136–145)
WBC # BLD AUTO: 5.46 K/UL (ref 3.9–12.7)

## 2023-12-09 PROCEDURE — 85025 COMPLETE CBC W/AUTO DIFF WBC: CPT | Performed by: STUDENT IN AN ORGANIZED HEALTH CARE EDUCATION/TRAINING PROGRAM

## 2023-12-09 PROCEDURE — 99233 PR SUBSEQUENT HOSPITAL CARE,LEVL III: ICD-10-PCS | Mod: GC,,, | Performed by: STUDENT IN AN ORGANIZED HEALTH CARE EDUCATION/TRAINING PROGRAM

## 2023-12-09 PROCEDURE — 99233 SBSQ HOSP IP/OBS HIGH 50: CPT | Mod: GC,,, | Performed by: STUDENT IN AN ORGANIZED HEALTH CARE EDUCATION/TRAINING PROGRAM

## 2023-12-09 PROCEDURE — 36415 COLL VENOUS BLD VENIPUNCTURE: CPT | Performed by: STUDENT IN AN ORGANIZED HEALTH CARE EDUCATION/TRAINING PROGRAM

## 2023-12-09 PROCEDURE — 83735 ASSAY OF MAGNESIUM: CPT | Performed by: STUDENT IN AN ORGANIZED HEALTH CARE EDUCATION/TRAINING PROGRAM

## 2023-12-09 PROCEDURE — 25000003 PHARM REV CODE 250: Performed by: PHYSICIAN ASSISTANT

## 2023-12-09 PROCEDURE — 84100 ASSAY OF PHOSPHORUS: CPT | Performed by: STUDENT IN AN ORGANIZED HEALTH CARE EDUCATION/TRAINING PROGRAM

## 2023-12-09 PROCEDURE — 80053 COMPREHEN METABOLIC PANEL: CPT | Performed by: STUDENT IN AN ORGANIZED HEALTH CARE EDUCATION/TRAINING PROGRAM

## 2023-12-09 PROCEDURE — 25000003 PHARM REV CODE 250

## 2023-12-09 PROCEDURE — 25000003 PHARM REV CODE 250: Performed by: REGISTERED NURSE

## 2023-12-09 PROCEDURE — 11000001 HC ACUTE MED/SURG PRIVATE ROOM

## 2023-12-09 PROCEDURE — 63600175 PHARM REV CODE 636 W HCPCS: Performed by: REGISTERED NURSE

## 2023-12-09 RX ORDER — NIFEDIPINE 30 MG/1
30 TABLET, EXTENDED RELEASE ORAL ONCE
Status: DISCONTINUED | OUTPATIENT
Start: 2023-12-09 | End: 2023-12-09

## 2023-12-09 RX ORDER — NIFEDIPINE 30 MG/1
30 TABLET, EXTENDED RELEASE ORAL DAILY
Status: DISCONTINUED | OUTPATIENT
Start: 2023-12-10 | End: 2023-12-10

## 2023-12-09 RX ORDER — NIFEDIPINE 30 MG/1
60 TABLET, EXTENDED RELEASE ORAL DAILY
Status: DISCONTINUED | OUTPATIENT
Start: 2023-12-10 | End: 2023-12-09

## 2023-12-09 RX ADMIN — Medication 6 MG: at 09:12

## 2023-12-09 RX ADMIN — HEPARIN SODIUM 5000 UNITS: 5000 INJECTION INTRAVENOUS; SUBCUTANEOUS at 03:12

## 2023-12-09 RX ADMIN — LOSARTAN POTASSIUM 50 MG: 50 TABLET, FILM COATED ORAL at 09:12

## 2023-12-09 RX ADMIN — HEPARIN SODIUM 5000 UNITS: 5000 INJECTION INTRAVENOUS; SUBCUTANEOUS at 06:12

## 2023-12-09 RX ADMIN — QUETIAPINE FUMARATE 50 MG: 25 TABLET ORAL at 09:12

## 2023-12-09 RX ADMIN — MEMANTINE HYDROCHLORIDE 5 MG: 5 TABLET ORAL at 09:12

## 2023-12-09 RX ADMIN — NIFEDIPINE 30 MG: 30 TABLET, FILM COATED, EXTENDED RELEASE ORAL at 09:12

## 2023-12-09 RX ADMIN — SENNOSIDES AND DOCUSATE SODIUM 1 TABLET: 8.6; 5 TABLET ORAL at 09:12

## 2023-12-09 RX ADMIN — HEPARIN SODIUM 5000 UNITS: 5000 INJECTION INTRAVENOUS; SUBCUTANEOUS at 09:12

## 2023-12-09 NOTE — PLAN OF CARE
Problem: Restraint, Nonbehavioral (Nonviolent)  Goal: Absence of Harm or Injury  Outcome: Ongoing, Progressing     Problem: Infection  Goal: Absence of Infection Signs and Symptoms  Outcome: Ongoing, Progressing     Problem: Adjustment to Illness (Stroke, Hemorrhagic)  Goal: Optimal Coping  Outcome: Ongoing, Progressing     Problem: Skin Injury Risk Increased  Goal: Skin Health and Integrity  Outcome: Ongoing, Progressing

## 2023-12-09 NOTE — ASSESSMENT & PLAN NOTE
Blood cultures from 12/3 growing micrococcus luteus, probable contaminant. However, also growing Rihzobium radiobacter. This may be seen in immunocompromised patients as an infectious organism. Patient remains afebrile and without leukocytosis. Discussed with infectious disease (Dr. Tamayo and Dr. Rollins)--due to lack of systemic signs, will repeat blood cultures, and observe for growth. If demonstrates signs of systemic infection, or repeat blood cultures demonstrate growth, will again discuss with ID. No growth on repeat blood cultures thus far.
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
## 2023-12-09 NOTE — ASSESSMENT & PLAN NOTE
-Due to stroke and noted on imaging.  4th ventricle compression specifically noted  -Likely etiology is hypertension  -Repeat CTH stable

## 2023-12-09 NOTE — SUBJECTIVE & OBJECTIVE
Neurologic Chief Complaint: ICH    Subjective:     Interval History: neuro exam stable; repeat blood cultures w/o growth thus far.    HPI, Past Medical, Family, and Social History remains the same as documented in the initial encounter.     Review of Systems   Unable to perform ROS: Dementia     Scheduled Meds:   heparin (porcine)  5,000 Units Subcutaneous Q8H    losartan  50 mg Oral Daily    melatonin  6 mg Oral Nightly    memantine  5 mg Oral BID    [START ON 12/10/2023] NIFEdipine  30 mg Oral Daily    QUEtiapine  50 mg Oral QHS    senna-docusate 8.6-50 mg  1 tablet Oral BID     Continuous Infusions:        PRN Meds:acetaminophen, hydrOXYzine HCL, labetalol, ondansetron    Objective:     Vital Signs (Most Recent):  Temp: 98 °F (36.7 °C) (12/09/23 1136)  Pulse: 86 (12/09/23 1541)  Resp: 16 (12/09/23 1136)  BP: (!) 105/57 (12/09/23 1136)  SpO2: 99 % (12/09/23 1136)  BP Location: Right arm    Vital Signs Range (Last 24H):  Temp:  [97.5 °F (36.4 °C)-98.2 °F (36.8 °C)]   Pulse:  [73-86]   Resp:  [16-18]   BP: (105-167)/()   SpO2:  [95 %-100 %]   BP Location: Right arm       Physical Exam  Vitals and nursing note reviewed.   Constitutional:       Appearance: He is ill-appearing.   HENT:      Head: Normocephalic and atraumatic.      Mouth/Throat:      Mouth: Mucous membranes are dry.   Cardiovascular:      Rate and Rhythm: Normal rate and regular rhythm.   Pulmonary:      Effort: Pulmonary effort is normal.   Abdominal:      Palpations: Abdomen is soft.   Musculoskeletal:      Right lower leg: No edema.      Left lower leg: No edema.   Skin:     General: Skin is dry.   Neurological:      Mental Status: He is alert.              Neurological Exam:   LOC: alert  Attention Span: poor  Language: No aphasia  Articulation: No dysarthria  Orientation: oriented to self only  Visual Fields: limited by patient being uncooperative with exam; does not appear to blink to threat when tested  EOM (CN III, IV, VI):  Full/intact  Pupils (CN II, III): PERRL  Facial Movement (CN VII): right sided facial droop  Motor: Arm left  Paresis: 4/5  Leg left  Paresis: 4/5  Arm right  Paresis: 4/5  Leg right Paresis: 4/5  Cerebellum: difficult to test due to patient's lack of cooperativeness with exam  Localizes pain throughout all four extremities    Laboratory:  BMP:   Recent Labs   Lab 12/08/23  0444      K 3.5      CO2 27   BUN 17   CREATININE 1.0   CALCIUM 8.7       CBC:   Recent Labs   Lab 12/09/23  1613   WBC 5.46   RBC 3.32*   HGB 10.1*   HCT 29.9*      MCV 90   MCH 30.4   MCHC 33.8       Lipid Panel:   Recent Labs   Lab 12/04/23  0359   CHOL 175   LDLCALC 105.8   HDL 58   TRIG 56       Hgb A1C:   Recent Labs   Lab 12/04/23  0359   HGBA1C 4.8       TSH:   Recent Labs   Lab 12/04/23  0359   TSH 5.463*         Diagnostic Results     Brain Imaging   MRI Brain: 12/9/23  Allowing for a difference in technique, similar appearance of left cerebellar hemorrhage with similar mild mass effect on the 4th ventricle.  No hydrocephalus.     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     Multiple chronic microhemorrhages as detailed above that may reflect combination of hypertensive hemorrhages and amyloid angiopathy.    CT Head 12/3/23  1. Left cerebellar intraparenchymal hemorrhage with intraventricular extension into the 4th ventricle.  2. No acute fracture or traumatic dislocation of the cervical spine.  3. Paranasal sinus disease.  4. Degenerative changes of the cervical spine most pronounced at C5-C6 with moderate spinal canal stenosis and severe bilateral neural foraminal narrowing.    CT Head 12/4/23: 4:01  Redemonstration of left cerebellar hemorrhage, unchanged in size and configuration from prior exam with continued interventricular extension.  No significant interval detrimental change compared to prior head CT.     CT Head 12/4/23:  13:47  Similar left cerebellar intraparenchymal hemorrhage with extension into the 4th ventricle.     Vessel Imaging   CTA Head 12/5/23  Stable left cerebellar hemorrhage, with mild extension into the 4th ventricle. Scattered mild intracranial atherosclerosis with no evidence of aneurysm, significant stenosis, or occlusion. Atherosclerosis about the visualized extracranial vasculature, noting potential focal ulceration about the distal left cervical internal carotid artery.    Cardiac Imaging   Echo 12/5/23    Left Ventricle: The left ventricle is normal in size. Normal wall thickness. There is concentric remodeling. Normal wall motion. There is normal systolic function with a visually estimated ejection fraction of 60 - 65%. Diastolic function cannot be reliably determined in the presence of mitral valve disease.    Right Ventricle: Normal right ventricular cavity size. Wall thickness is normal. Right ventricle wall motion  is normal. Systolic function is normal.    Aortic Valve: The aortic valve is a trileaflet valve. There is mild aortic valve sclerosis. There is annular calcification present. There is mild aortic regurgitation.    Mitral Valve: There is moderate mitral annular calcification present. There is normal leaflet mobility. There is systolic anterior motion of the mitral valve. There is moderate stenosis. The mean pressure gradient across the mitral valve is 8 mmHg at a heart rate of 96 bpm. There is no significant regurgitation.    Tricuspid Valve: There is mild regurgitation.    Pulmonary Artery: The estimated pulmonary artery systolic pressure is 36 mmHg.    IVC/SVC: Normal venous pressure at 3 mmHg.    Pericardium: Left pleural effusion.

## 2023-12-09 NOTE — PLAN OF CARE
Problem: Adjustment to Illness (Stroke, Hemorrhagic)  Goal: Optimal Coping  Outcome: Ongoing, Progressing  Intervention: Support Psychosocial Response to Stroke  Flowsheets (Taken 12/9/2023 3464)  Supportive Measures: active listening utilized  Family/Support System Care: involvement promoted

## 2023-12-10 LAB
ALBUMIN SERPL BCP-MCNC: 2.7 G/DL (ref 3.5–5.2)
ALP SERPL-CCNC: 60 U/L (ref 55–135)
ALT SERPL W/O P-5'-P-CCNC: 14 U/L (ref 10–44)
ANION GAP SERPL CALC-SCNC: 9 MMOL/L (ref 8–16)
AST SERPL-CCNC: 31 U/L (ref 10–40)
BASOPHILS # BLD AUTO: 0.02 K/UL (ref 0–0.2)
BASOPHILS NFR BLD: 0.5 % (ref 0–1.9)
BILIRUB SERPL-MCNC: 0.3 MG/DL (ref 0.1–1)
BUN SERPL-MCNC: 14 MG/DL (ref 8–23)
CALCIUM SERPL-MCNC: 8.5 MG/DL (ref 8.7–10.5)
CHLORIDE SERPL-SCNC: 102 MMOL/L (ref 95–110)
CO2 SERPL-SCNC: 31 MMOL/L (ref 23–29)
CREAT SERPL-MCNC: 0.9 MG/DL (ref 0.5–1.4)
DIFFERENTIAL METHOD: ABNORMAL
EOSINOPHIL # BLD AUTO: 0.1 K/UL (ref 0–0.5)
EOSINOPHIL NFR BLD: 1.3 % (ref 0–8)
ERYTHROCYTE [DISTWIDTH] IN BLOOD BY AUTOMATED COUNT: 16.7 % (ref 11.5–14.5)
EST. GFR  (NO RACE VARIABLE): >60 ML/MIN/1.73 M^2
GLUCOSE SERPL-MCNC: 63 MG/DL (ref 70–110)
HCT VFR BLD AUTO: 30.5 % (ref 40–54)
HGB BLD-MCNC: 9.9 G/DL (ref 14–18)
IMM GRANULOCYTES # BLD AUTO: 0.01 K/UL (ref 0–0.04)
IMM GRANULOCYTES NFR BLD AUTO: 0.3 % (ref 0–0.5)
LYMPHOCYTES # BLD AUTO: 1.2 K/UL (ref 1–4.8)
LYMPHOCYTES NFR BLD: 31.8 % (ref 18–48)
MAGNESIUM SERPL-MCNC: 2.1 MG/DL (ref 1.6–2.6)
MCH RBC QN AUTO: 30.4 PG (ref 27–31)
MCHC RBC AUTO-ENTMCNC: 32.5 G/DL (ref 32–36)
MCV RBC AUTO: 94 FL (ref 82–98)
MONOCYTES # BLD AUTO: 0.3 K/UL (ref 0.3–1)
MONOCYTES NFR BLD: 8.7 % (ref 4–15)
NEUTROPHILS # BLD AUTO: 2.2 K/UL (ref 1.8–7.7)
NEUTROPHILS NFR BLD: 57.4 % (ref 38–73)
NRBC BLD-RTO: 0 /100 WBC
PHOSPHATE SERPL-MCNC: 2.7 MG/DL (ref 2.7–4.5)
PLATELET # BLD AUTO: 180 K/UL (ref 150–450)
PMV BLD AUTO: 10.2 FL (ref 9.2–12.9)
POTASSIUM SERPL-SCNC: 3.2 MMOL/L (ref 3.5–5.1)
PROT SERPL-MCNC: 6.8 G/DL (ref 6–8.4)
RBC # BLD AUTO: 3.26 M/UL (ref 4.6–6.2)
SODIUM SERPL-SCNC: 142 MMOL/L (ref 136–145)
WBC # BLD AUTO: 3.81 K/UL (ref 3.9–12.7)

## 2023-12-10 PROCEDURE — 84100 ASSAY OF PHOSPHORUS: CPT

## 2023-12-10 PROCEDURE — 83735 ASSAY OF MAGNESIUM: CPT

## 2023-12-10 PROCEDURE — 25000003 PHARM REV CODE 250: Performed by: PHYSICIAN ASSISTANT

## 2023-12-10 PROCEDURE — 25000003 PHARM REV CODE 250

## 2023-12-10 PROCEDURE — 36415 COLL VENOUS BLD VENIPUNCTURE: CPT | Performed by: NURSE PRACTITIONER

## 2023-12-10 PROCEDURE — 11000001 HC ACUTE MED/SURG PRIVATE ROOM

## 2023-12-10 PROCEDURE — 99233 PR SUBSEQUENT HOSPITAL CARE,LEVL III: ICD-10-PCS | Mod: GC,,, | Performed by: STUDENT IN AN ORGANIZED HEALTH CARE EDUCATION/TRAINING PROGRAM

## 2023-12-10 PROCEDURE — 25000003 PHARM REV CODE 250: Performed by: REGISTERED NURSE

## 2023-12-10 PROCEDURE — 85025 COMPLETE CBC W/AUTO DIFF WBC: CPT | Performed by: NURSE PRACTITIONER

## 2023-12-10 PROCEDURE — 63600175 PHARM REV CODE 636 W HCPCS: Performed by: REGISTERED NURSE

## 2023-12-10 PROCEDURE — 80053 COMPREHEN METABOLIC PANEL: CPT | Performed by: NURSE PRACTITIONER

## 2023-12-10 PROCEDURE — 99233 SBSQ HOSP IP/OBS HIGH 50: CPT | Mod: GC,,, | Performed by: STUDENT IN AN ORGANIZED HEALTH CARE EDUCATION/TRAINING PROGRAM

## 2023-12-10 RX ORDER — NIFEDIPINE 30 MG/1
60 TABLET, EXTENDED RELEASE ORAL DAILY
Status: DISCONTINUED | OUTPATIENT
Start: 2023-12-10 | End: 2023-12-14

## 2023-12-10 RX ORDER — CIPROFLOXACIN 500 MG/1
500 TABLET ORAL EVERY 12 HOURS
Status: DISCONTINUED | OUTPATIENT
Start: 2023-12-10 | End: 2023-12-11

## 2023-12-10 RX ADMIN — CIPROFLOXACIN 500 MG: 500 TABLET, FILM COATED ORAL at 09:12

## 2023-12-10 RX ADMIN — QUETIAPINE FUMARATE 50 MG: 25 TABLET ORAL at 09:12

## 2023-12-10 RX ADMIN — POTASSIUM BICARBONATE 25 MEQ: 978 TABLET, EFFERVESCENT ORAL at 09:12

## 2023-12-10 RX ADMIN — MEMANTINE HYDROCHLORIDE 5 MG: 5 TABLET ORAL at 09:12

## 2023-12-10 RX ADMIN — MEMANTINE HYDROCHLORIDE 5 MG: 5 TABLET ORAL at 08:12

## 2023-12-10 RX ADMIN — SENNOSIDES AND DOCUSATE SODIUM 1 TABLET: 8.6; 5 TABLET ORAL at 09:12

## 2023-12-10 RX ADMIN — NIFEDIPINE 60 MG: 30 TABLET, FILM COATED, EXTENDED RELEASE ORAL at 08:12

## 2023-12-10 RX ADMIN — HEPARIN SODIUM 5000 UNITS: 5000 INJECTION INTRAVENOUS; SUBCUTANEOUS at 03:12

## 2023-12-10 RX ADMIN — HEPARIN SODIUM 5000 UNITS: 5000 INJECTION INTRAVENOUS; SUBCUTANEOUS at 09:12

## 2023-12-10 RX ADMIN — Medication 6 MG: at 09:12

## 2023-12-10 RX ADMIN — CIPROFLOXACIN 500 MG: 500 TABLET, FILM COATED ORAL at 12:12

## 2023-12-10 RX ADMIN — HEPARIN SODIUM 5000 UNITS: 5000 INJECTION INTRAVENOUS; SUBCUTANEOUS at 05:12

## 2023-12-10 RX ADMIN — LOSARTAN POTASSIUM 50 MG: 50 TABLET, FILM COATED ORAL at 08:12

## 2023-12-10 NOTE — NURSING
Nurses Note -- 4 Eyes      12/9/2023   10:43 PM      Skin assessed during: Q Shift Change      [] No Altered Skin Integrity Present    []Prevention Measures Documented      [] Yes- Altered Skin Integrity Present or Discovered   [] LDA Added if Not in Epic (Describe Wound)   [x] New Altered Skin Integrity was Present on Admit and Documented in LDA   [] Wound Image Taken    Wound Care Consulted? No    Attending Nurse:  Tavon Peoples RN/Staff Member:  RAUL Dodd

## 2023-12-10 NOTE — SUBJECTIVE & OBJECTIVE
Neurologic Chief Complaint: ICH    Subjective:     Interval History: neuro exam stable; repeat blood cultures w/o growth, and still no systemic signs of infection; discussed again w/ID and will start a 2 week course of Cipro    HPI, Past Medical, Family, and Social History remains the same as documented in the initial encounter.     Review of Systems   Unable to perform ROS: Dementia     Scheduled Meds:   ciprofloxacin HCl  500 mg Oral Q12H    heparin (porcine)  5,000 Units Subcutaneous Q8H    losartan  50 mg Oral Daily    melatonin  6 mg Oral Nightly    memantine  5 mg Oral BID    NIFEdipine  60 mg Oral Daily    QUEtiapine  50 mg Oral QHS    senna-docusate 8.6-50 mg  1 tablet Oral BID     Continuous Infusions:        PRN Meds:acetaminophen, hydrOXYzine HCL, labetalol, ondansetron    Objective:     Vital Signs (Most Recent):  Temp: 97.7 °F (36.5 °C) (12/10/23 1127)  Pulse: 85 (12/10/23 1142)  Resp: 18 (12/10/23 1127)  BP: 104/71 (12/10/23 1127)  SpO2: (!) 93 % (12/10/23 1127)  BP Location: Right arm    Vital Signs Range (Last 24H):  Temp:  [96.6 °F (35.9 °C)-99.6 °F (37.6 °C)]   Pulse:  [70-88]   Resp:  [16-18]   BP: (104-180)/()   SpO2:  [93 %-100 %]   BP Location: Right arm       Physical Exam  Vitals and nursing note reviewed.   Constitutional:       Appearance: He is ill-appearing.   HENT:      Head: Normocephalic and atraumatic.      Mouth/Throat:      Mouth: Mucous membranes are dry.   Cardiovascular:      Rate and Rhythm: Normal rate and regular rhythm.   Pulmonary:      Effort: Pulmonary effort is normal.   Abdominal:      Palpations: Abdomen is soft.   Musculoskeletal:      Right lower leg: No edema.      Left lower leg: No edema.   Skin:     General: Skin is dry.   Neurological:      Mental Status: He is alert.              Neurological Exam:   LOC: alert  Attention Span: poor  Language: No aphasia  Articulation: No dysarthria  Orientation: oriented to self only  Visual Fields: limited by patient  being uncooperative with exam; does not appear to blink to threat when tested  EOM (CN III, IV, VI): Full/intact  Pupils (CN II, III): PERRL  Facial Movement (CN VII): right sided facial droop  Motor: Arm left  Paresis: 4/5  Leg left  Paresis: 4/5  Arm right  Paresis: 4/5  Leg right Paresis: 4/5  Cerebellum: difficult to test due to patient's lack of cooperativeness with exam  Localizes pain throughout all four extremities    Laboratory:  BMP:   Recent Labs   Lab 12/10/23  0710      K 3.2*      CO2 31*   BUN 14   CREATININE 0.9   CALCIUM 8.5*       CBC:   Recent Labs   Lab 12/10/23  0710   WBC 3.81*   RBC 3.26*   HGB 9.9*   HCT 30.5*      MCV 94   MCH 30.4   MCHC 32.5       Lipid Panel:   Recent Labs   Lab 12/04/23  0359   CHOL 175   LDLCALC 105.8   HDL 58   TRIG 56       Hgb A1C:   Recent Labs   Lab 12/04/23  0359   HGBA1C 4.8       TSH:   Recent Labs   Lab 12/04/23  0359   TSH 5.463*         Diagnostic Results     Brain Imaging   MRI Brain: 12/9/23  Allowing for a difference in technique, similar appearance of left cerebellar hemorrhage with similar mild mass effect on the 4th ventricle.  No hydrocephalus.     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     Multiple chronic microhemorrhages as detailed above that may reflect combination of hypertensive hemorrhages and amyloid angiopathy.    CT Head 12/3/23  1. Left cerebellar intraparenchymal hemorrhage with intraventricular extension into the 4th ventricle.  2. No acute fracture or traumatic dislocation of the cervical spine.  3. Paranasal sinus disease.  4. Degenerative changes of the cervical spine most pronounced at C5-C6 with moderate spinal canal stenosis and severe bilateral neural foraminal narrowing.    CT Head 12/4/23: 4:01  Redemonstration of left cerebellar hemorrhage, unchanged in size and configuration from prior exam with continued interventricular extension.   No significant interval detrimental change compared to prior head CT.     CT Head 12/4/23: 13:47  Similar left cerebellar intraparenchymal hemorrhage with extension into the 4th ventricle.     Vessel Imaging   CTA Head 12/5/23  Stable left cerebellar hemorrhage, with mild extension into the 4th ventricle. Scattered mild intracranial atherosclerosis with no evidence of aneurysm, significant stenosis, or occlusion. Atherosclerosis about the visualized extracranial vasculature, noting potential focal ulceration about the distal left cervical internal carotid artery.    Cardiac Imaging   Echo 12/5/23    Left Ventricle: The left ventricle is normal in size. Normal wall thickness. There is concentric remodeling. Normal wall motion. There is normal systolic function with a visually estimated ejection fraction of 60 - 65%. Diastolic function cannot be reliably determined in the presence of mitral valve disease.    Right Ventricle: Normal right ventricular cavity size. Wall thickness is normal. Right ventricle wall motion  is normal. Systolic function is normal.    Aortic Valve: The aortic valve is a trileaflet valve. There is mild aortic valve sclerosis. There is annular calcification present. There is mild aortic regurgitation.    Mitral Valve: There is moderate mitral annular calcification present. There is normal leaflet mobility. There is systolic anterior motion of the mitral valve. There is moderate stenosis. The mean pressure gradient across the mitral valve is 8 mmHg at a heart rate of 96 bpm. There is no significant regurgitation.    Tricuspid Valve: There is mild regurgitation.    Pulmonary Artery: The estimated pulmonary artery systolic pressure is 36 mmHg.    IVC/SVC: Normal venous pressure at 3 mmHg.    Pericardium: Left pleural effusion.

## 2023-12-10 NOTE — PLAN OF CARE
Problem: Fall Injury Risk  Goal: Absence of Fall and Fall-Related Injury  Outcome: Ongoing, Progressing  Intervention: Promote Injury-Free Environment  Flowsheets (Taken 12/10/2023 1649)  Safety Promotion/Fall Prevention:   assistive device/personal item within reach   bed alarm set     Problem: Pain (Stroke, Hemorrhagic)  Goal: Acceptable Pain Control  Outcome: Ongoing, Progressing  Intervention: Monitor and Manage Pain  Flowsheets (Taken 12/10/2023 1649)  Pain Management Interventions: care clustered
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
## 2023-12-10 NOTE — ASSESSMENT & PLAN NOTE
Blood cultures from 12/3 growing micrococcus luteus, probable contaminant. However, also growing Rihzobium radiobacter. This may be seen in immunocompromised patients as an infectious organism. Patient remains afebrile and without leukocytosis. Discussed with infectious disease (Dr. Tamayo and Dr. Rollins)--due to lack of systemic signs, will repeat blood cultures, and observe for growth. If demonstrates signs of systemic infection, or repeat blood cultures demonstrate growth, will again discuss with ID. No growth on repeat blood cultures thus far.    Discussed again w/Dr. Rollins on 12/10. Repeat cultures NGTD. Still no systemic signs of infection. Will start 2 week course of oral Cipro 500mg BID. Per ID, doesn't need follow-up appointment w/ID unless additional work-up is necessary.

## 2023-12-10 NOTE — PLAN OF CARE
Problem: Fall Injury Risk  Goal: Absence of Fall and Fall-Related Injury  Outcome: Ongoing, Progressing     Problem: Restraint, Nonbehavioral (Nonviolent)  Goal: Absence of Harm or Injury  Outcome: Ongoing, Progressing     Problem: Infection  Goal: Absence of Infection Signs and Symptoms  Outcome: Ongoing, Progressing     Problem: Adult Inpatient Plan of Care  Goal: Plan of Care Review  Outcome: Ongoing, Progressing  Goal: Patient-Specific Goal (Individualized)  Outcome: Ongoing, Progressing  Goal: Absence of Hospital-Acquired Illness or Injury  Outcome: Ongoing, Progressing  Goal: Optimal Comfort and Wellbeing  Outcome: Ongoing, Progressing  Goal: Readiness for Transition of Care  Outcome: Ongoing, Progressing     Problem: Cognitive Impairment (Stroke, Hemorrhagic)  Goal: Optimal Cognitive Function  Outcome: Ongoing, Progressing     Problem: Communication Impairment (Stroke, Hemorrhagic)  Goal: Effective Communication Skills  Outcome: Ongoing, Progressing     Problem: Functional Ability Impaired (Stroke, Hemorrhagic)  Goal: Optimal Functional Ability  Outcome: Ongoing, Progressing     Problem: Swallowing Impairment (Stroke, Hemorrhagic)  Goal: Optimal Eating and Swallowing Without Aspiration  Outcome: Ongoing, Progressing     Problem: Impaired Wound Healing  Goal: Optimal Wound Healing  Outcome: Ongoing, Progressing     Problem: Skin Injury Risk Increased  Goal: Skin Health and Integrity  Outcome: Ongoing, Progressing   POC reviewed with patient at bedside. VSS. NADN. Bilateral hand mitten restraints remain in place. Rotation therapy implemented, turning pt q2h. Telesitter at bedside, side rails up x3, bed in low position with wheels locked, bed alarm on. Uneventful shift, current poc ongoing.

## 2023-12-10 NOTE — NURSING
Charge received a call from the son Sheng Virgen III, asking that a media block be placed on his father. He also requested that we no longer allow Donya Reyes, his now ex-girlfriend, to visit or receive information.

## 2023-12-11 LAB
ALBUMIN SERPL BCP-MCNC: 2.6 G/DL (ref 3.5–5.2)
ALP SERPL-CCNC: 92 U/L (ref 55–135)
ALT SERPL W/O P-5'-P-CCNC: 16 U/L (ref 10–44)
ANION GAP SERPL CALC-SCNC: 7 MMOL/L (ref 8–16)
AST SERPL-CCNC: 42 U/L (ref 10–40)
BASOPHILS # BLD AUTO: 0.02 K/UL (ref 0–0.2)
BASOPHILS NFR BLD: 0.5 % (ref 0–1.9)
BILIRUB SERPL-MCNC: 0.3 MG/DL (ref 0.1–1)
BUN SERPL-MCNC: 17 MG/DL (ref 8–23)
CALCIUM SERPL-MCNC: 8.3 MG/DL (ref 8.7–10.5)
CHLORIDE SERPL-SCNC: 103 MMOL/L (ref 95–110)
CO2 SERPL-SCNC: 29 MMOL/L (ref 23–29)
CREAT SERPL-MCNC: 1 MG/DL (ref 0.5–1.4)
DIFFERENTIAL METHOD: ABNORMAL
EOSINOPHIL # BLD AUTO: 0 K/UL (ref 0–0.5)
EOSINOPHIL NFR BLD: 0.7 % (ref 0–8)
ERYTHROCYTE [DISTWIDTH] IN BLOOD BY AUTOMATED COUNT: 16.1 % (ref 11.5–14.5)
EST. GFR  (NO RACE VARIABLE): >60 ML/MIN/1.73 M^2
GLUCOSE SERPL-MCNC: 77 MG/DL (ref 70–110)
HCT VFR BLD AUTO: 27.4 % (ref 40–54)
HGB BLD-MCNC: 9 G/DL (ref 14–18)
IMM GRANULOCYTES # BLD AUTO: 0.02 K/UL (ref 0–0.04)
IMM GRANULOCYTES NFR BLD AUTO: 0.5 % (ref 0–0.5)
LYMPHOCYTES # BLD AUTO: 1.2 K/UL (ref 1–4.8)
LYMPHOCYTES NFR BLD: 29.5 % (ref 18–48)
MAGNESIUM SERPL-MCNC: 2.1 MG/DL (ref 1.6–2.6)
MCH RBC QN AUTO: 30.2 PG (ref 27–31)
MCHC RBC AUTO-ENTMCNC: 32.8 G/DL (ref 32–36)
MCV RBC AUTO: 92 FL (ref 82–98)
MONOCYTES # BLD AUTO: 0.4 K/UL (ref 0.3–1)
MONOCYTES NFR BLD: 9.2 % (ref 4–15)
NEUTROPHILS # BLD AUTO: 2.5 K/UL (ref 1.8–7.7)
NEUTROPHILS NFR BLD: 59.6 % (ref 38–73)
NRBC BLD-RTO: 0 /100 WBC
PHOSPHATE SERPL-MCNC: 2.2 MG/DL (ref 2.7–4.5)
PLATELET # BLD AUTO: 173 K/UL (ref 150–450)
PMV BLD AUTO: 10.2 FL (ref 9.2–12.9)
POTASSIUM SERPL-SCNC: 3.7 MMOL/L (ref 3.5–5.1)
PROT SERPL-MCNC: 6.6 G/DL (ref 6–8.4)
RBC # BLD AUTO: 2.98 M/UL (ref 4.6–6.2)
SODIUM SERPL-SCNC: 139 MMOL/L (ref 136–145)
WBC # BLD AUTO: 4.14 K/UL (ref 3.9–12.7)

## 2023-12-11 PROCEDURE — 99233 SBSQ HOSP IP/OBS HIGH 50: CPT | Mod: ,,, | Performed by: STUDENT IN AN ORGANIZED HEALTH CARE EDUCATION/TRAINING PROGRAM

## 2023-12-11 PROCEDURE — 80053 COMPREHEN METABOLIC PANEL: CPT | Performed by: NURSE PRACTITIONER

## 2023-12-11 PROCEDURE — 36415 COLL VENOUS BLD VENIPUNCTURE: CPT | Performed by: NURSE PRACTITIONER

## 2023-12-11 PROCEDURE — 84100 ASSAY OF PHOSPHORUS: CPT

## 2023-12-11 PROCEDURE — 99233 PR SUBSEQUENT HOSPITAL CARE,LEVL III: ICD-10-PCS | Mod: ,,, | Performed by: STUDENT IN AN ORGANIZED HEALTH CARE EDUCATION/TRAINING PROGRAM

## 2023-12-11 PROCEDURE — 83735 ASSAY OF MAGNESIUM: CPT

## 2023-12-11 PROCEDURE — 99222 PR INITIAL HOSPITAL CARE,LEVL II: ICD-10-PCS | Mod: ,,, | Performed by: INTERNAL MEDICINE

## 2023-12-11 PROCEDURE — 63600175 PHARM REV CODE 636 W HCPCS: Performed by: REGISTERED NURSE

## 2023-12-11 PROCEDURE — 97112 NEUROMUSCULAR REEDUCATION: CPT

## 2023-12-11 PROCEDURE — 99222 1ST HOSP IP/OBS MODERATE 55: CPT | Mod: ,,, | Performed by: INTERNAL MEDICINE

## 2023-12-11 PROCEDURE — 97530 THERAPEUTIC ACTIVITIES: CPT | Mod: CO

## 2023-12-11 PROCEDURE — 25000003 PHARM REV CODE 250

## 2023-12-11 PROCEDURE — 97530 THERAPEUTIC ACTIVITIES: CPT

## 2023-12-11 PROCEDURE — 97535 SELF CARE MNGMENT TRAINING: CPT | Mod: CO

## 2023-12-11 PROCEDURE — 25000003 PHARM REV CODE 250: Performed by: PHYSICIAN ASSISTANT

## 2023-12-11 PROCEDURE — 85025 COMPLETE CBC W/AUTO DIFF WBC: CPT | Performed by: NURSE PRACTITIONER

## 2023-12-11 PROCEDURE — 11000001 HC ACUTE MED/SURG PRIVATE ROOM

## 2023-12-11 RX ORDER — TALC
6 POWDER (GRAM) TOPICAL NIGHTLY
Refills: 0
Start: 2023-12-11

## 2023-12-11 RX ORDER — LOSARTAN POTASSIUM 50 MG/1
50 TABLET ORAL DAILY
Qty: 90 TABLET | Refills: 3
Start: 2023-12-11 | End: 2023-12-15 | Stop reason: HOSPADM

## 2023-12-11 RX ORDER — CIPROFLOXACIN 500 MG/1
500 TABLET ORAL EVERY 12 HOURS
Qty: 12 TABLET | Refills: 0
Start: 2023-12-11 | End: 2023-12-11 | Stop reason: HOSPADM

## 2023-12-11 RX ORDER — QUETIAPINE FUMARATE 50 MG/1
50 TABLET, FILM COATED ORAL NIGHTLY
Qty: 30 TABLET | Refills: 11
Start: 2023-12-11 | End: 2024-12-10

## 2023-12-11 RX ORDER — AMOXICILLIN 250 MG
1 CAPSULE ORAL 2 TIMES DAILY PRN
Start: 2023-12-11

## 2023-12-11 RX ORDER — ACETAMINOPHEN 325 MG/1
650 TABLET ORAL EVERY 6 HOURS PRN
Refills: 0
Start: 2023-12-11

## 2023-12-11 RX ORDER — NIFEDIPINE 60 MG/1
60 TABLET, EXTENDED RELEASE ORAL DAILY
Qty: 30 TABLET | Refills: 11
Start: 2023-12-11 | End: 2023-12-15 | Stop reason: HOSPADM

## 2023-12-11 RX ADMIN — NIFEDIPINE 60 MG: 30 TABLET, FILM COATED, EXTENDED RELEASE ORAL at 09:12

## 2023-12-11 RX ADMIN — HEPARIN SODIUM 5000 UNITS: 5000 INJECTION INTRAVENOUS; SUBCUTANEOUS at 06:12

## 2023-12-11 RX ADMIN — Medication 6 MG: at 10:12

## 2023-12-11 RX ADMIN — LOSARTAN POTASSIUM 50 MG: 50 TABLET, FILM COATED ORAL at 09:12

## 2023-12-11 RX ADMIN — HEPARIN SODIUM 5000 UNITS: 5000 INJECTION INTRAVENOUS; SUBCUTANEOUS at 10:12

## 2023-12-11 RX ADMIN — MEMANTINE HYDROCHLORIDE 5 MG: 5 TABLET ORAL at 09:12

## 2023-12-11 RX ADMIN — HEPARIN SODIUM 5000 UNITS: 5000 INJECTION INTRAVENOUS; SUBCUTANEOUS at 02:12

## 2023-12-11 RX ADMIN — MEMANTINE HYDROCHLORIDE 5 MG: 5 TABLET ORAL at 10:12

## 2023-12-11 RX ADMIN — QUETIAPINE FUMARATE 50 MG: 25 TABLET ORAL at 10:12

## 2023-12-11 RX ADMIN — CIPROFLOXACIN 500 MG: 500 TABLET, FILM COATED ORAL at 09:12
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
## 2023-12-11 NOTE — CONSULTS
"[facility])  Infectious Disease  Consult Note    Patient Name: Sheng Virgen  MRN: [id number]  Admission Date: 12/3/2023  Hospital Length of Stay: 7 days  Attending Physician: Emily Vergara MD  Primary Care Provider: Rupal Garcia MD     Isolation Status: No active isolations    Patient information was obtained from relative(s) and ER records.      Inpatient consult to Infectious Diseases  Consult performed by: Paige Tamayo DO  Consult ordered by: Julia Santo PA-C        Assessment/Plan:     ID  Positive blood culture  83M with PMH of prostate cancer, HTN, and dementia, had presented with AMS, found to have L cerebellar ICH with extension into and compression of 4th ventricle. 12/3 blood cultures with micrococcus luteus and rhizobium radiobacter in 1/2 sets. Repeat on 12/8 negative. Started on cipro on 12/10. Has been afebrile, normal WBC.     Recommendations  12/3 blood cultures most likely contaminated  Favor stopping all antibiotics        Thank you for your consult. I will sign off. Please contact us if you have any additional questions.    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 Virgen is a 83M with PMH of prostate cancer, HTN, and dementia, had presented with AMS, found to have L cerebellar ICH with extension into and compression of 4th ventricle. Infectious disease consulted for "clinical need for antibiotics with recent positive blood culture".     12/3 blood cultures with micrococcus luteus and rhizobium radiobacter in 1/2 sets. Repeat on 12/8 negative. Started on cipro on 12/10. Has been afebrile, normal WBC.     Past Medical History:   Diagnosis Date    AR (allergic rhinitis)     Chronic hepatitis C without hepatic coma 8/1/2023    Hypertension     hereditary    Memory loss     Prostate cancer        History reviewed. No pertinent " surgical history.    Review of patient's allergies indicates:   Allergen Reactions    Strawberries [strawberry] Hives, Itching and Rash     Strawberries make patient itch and brake  out in hives with a rash and makes lips swell up.        Medications:  Medications Prior to Admission   Medication Sig    donepezil (ARICEPT) 5 MG tablet Take 1 tablet (5 mg total) by mouth once daily.    memantine (NAMENDA) 10 MG Tab Take 5 mg by mouth 2 (two) times daily.    [DISCONTINUED] sildenafil (VIAGRA) 100 MG tablet Take 1 tablet (100 mg total) by mouth daily as needed for Erectile Dysfunction.    [DISCONTINUED] tadalafil (CIALIS) 20 MG Tab Take 1 tablet (20 mg total) by mouth daily as needed. Take 1 hour before intercourse    [DISCONTINUED] vardenafil (LEVITRA) 20 MG tablet Take 1 tablet (20 mg total) by mouth daily as needed for Erectile Dysfunction. Take 1 hour before intercourse.     Antibiotics (From admission, onward)      Start     Stop Route Frequency Ordered    12/10/23 1230  ciprofloxacin HCl tablet 500 mg         12/24/23 0859 Oral Every 12 hours 12/10/23 1124          Antifungals (From admission, onward)      None          Antivirals (From admission, onward)      None             Immunization History   Administered Date(s) Administered    COVID-19, MRNA, LN-S, PF (Pfizer) (Gray Cap) 04/28/2022    COVID-19, MRNA, LN-S, PF (Pfizer) (Purple Cap) 08/18/2021, 10/14/2021       Family History       Problem Relation (Age of Onset)    Colon cancer Father, Sister    Hypertension Mother          Social History     Socioeconomic History    Marital status:    Tobacco Use    Smoking status: Every Day     Current packs/day: 0.25     Types: Cigarettes    Smokeless tobacco: Never   Substance and Sexual Activity    Alcohol use: No    Drug use: No    Sexual activity: Yes     Partners: Female     Birth control/protection: None     Social Determinants of Health     Financial Resource Strain: Low Risk  (12/4/2023)    Overall  Financial Resource Strain (CARDIA)     Difficulty of Paying Living Expenses: Not hard at all   Food Insecurity: No Food Insecurity (12/4/2023)    Hunger Vital Sign     Worried About Running Out of Food in the Last Year: Never true     Ran Out of Food in the Last Year: Never true   Transportation Needs: No Transportation Needs (12/4/2023)    PRAPARE - Transportation     Lack of Transportation (Medical): No     Lack of Transportation (Non-Medical): No   Physical Activity: Inactive (12/4/2023)    Exercise Vital Sign     Days of Exercise per Week: 0 days     Minutes of Exercise per Session: 0 min   Social Connections: Socially Isolated (12/4/2023)    Social Connection and Isolation Panel [NHANES]     Frequency of Communication with Friends and Family: Never     Frequency of Social Gatherings with Friends and Family: Never     Attends Latter day Services: Never     Active Member of Clubs or Organizations: No     Attends Club or Organization Meetings: Never     Marital Status:    Housing Stability: Low Risk  (12/4/2023)    Housing Stability Vital Sign     Unable to Pay for Housing in the Last Year: No     Number of Places Lived in the Last Year: 1     Unstable Housing in the Last Year: No     Review of Systems   Unable to perform ROS: Acuity of condition     Objective:     Vital Signs (Most Recent):  Temp: 98.4 °F (36.9 °C) (12/11/23 1447)  Pulse: 80 (12/11/23 1447)  Resp: 16 (12/11/23 1447)  BP: 108/68 (12/11/23 1447)  SpO2: 95 % (12/11/23 1447) Vital Signs (24h Range):  Temp:  [97.7 °F (36.5 °C)-99 °F (37.2 °C)] 98.4 °F (36.9 °C)  Pulse:  [77-92] 80  Resp:  [16-20] 16  SpO2:  [94 %-100 %] 95 %  BP: (108-173)/() 108/68     Weight: 64.4 kg (142 lb)  Body mass index is 19.26 kg/m².    Estimated Creatinine Clearance: 51 mL/min (based on SCr of 1 mg/dL).     Physical Exam  Vitals reviewed.   Constitutional:       General: He is not in acute distress.     Appearance: Normal appearance. He is not ill-appearing.    HENT:      Head: Normocephalic and atraumatic.   Eyes:      Extraocular Movements: Extraocular movements intact.      Conjunctiva/sclera: Conjunctivae normal.   Cardiovascular:      Rate and Rhythm: Normal rate and regular rhythm.      Heart sounds: No murmur heard.  Pulmonary:      Effort: Pulmonary effort is normal. No respiratory distress.      Breath sounds: Normal breath sounds.   Abdominal:      General: Abdomen is flat. Bowel sounds are normal.      Palpations: Abdomen is soft.   Skin:     General: Skin is warm and dry.   Neurological:      Mental Status: He is alert.          Significant Labs: All pertinent labs within the past 24 hours have been reviewed.  Recent Lab Results         12/11/23  0229        Albumin 2.6       ALP 92       ALT 16       Anion Gap 7       AST 42       Baso # 0.02       Basophil % 0.5       BILIRUBIN TOTAL 0.3  Comment: For infants and newborns, interpretation of results should be based  on gestational age, weight and in agreement with clinical  observations.    Premature Infant recommended reference ranges:  Up to 24 hours.............<8.0 mg/dL  Up to 48 hours............<12.0 mg/dL  3-5 days..................<15.0 mg/dL  6-29 days.................<15.0 mg/dL         BUN 17       Calcium 8.3       Chloride 103       CO2 29       Creatinine 1.0       Differential Method Automated       eGFR >60.0       Eos # 0.0       Eosinophil % 0.7       Glucose 77       Gran # (ANC) 2.5       Gran % 59.6       Hematocrit 27.4       Hemoglobin 9.0       Immature Grans (Abs) 0.02  Comment: Mild elevation in immature granulocytes is non specific and   can be seen in a variety of conditions including stress response,   acute inflammation, trauma and pregnancy. Correlation with other   laboratory and clinical findings is essential.         Immature Granulocytes 0.5       Lymph # 1.2       Lymph % 29.5       Magnesium  2.1       MCH 30.2       MCHC 32.8       MCV 92       Mono # 0.4       Mono %  9.2       MPV 10.2       nRBC 0       Phosphorus Level 2.2       Platelet Count 173       Potassium 3.7       PROTEIN TOTAL 6.6       RBC 2.98       RDW 16.1       Sodium 139       WBC 4.14               Significant Imaging: I have reviewed all pertinent imaging results/findings within the past 24 hours.

## 2023-12-11 NOTE — PHYSICIAN QUERY
PT Name: Sheng Virgen  MR #: [id number]    DOCUMENTATION CLARIFICATION     CDS/: Rito Fitzpatrick RN, BSN   Contact information: [email or web address]     This form is a permanent document in the medical record.     Query Date: December 11, 2023    By submitting this query, we are merely seeking further clarification of documentation. Please utilize your independent clinical judgment when addressing the question(s) below.    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 male with a significant medical history of dementia, HTN, prostate CA who presents to the hospital for evaluation of AMS. 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 Repeat CTH has been stable. CTA is without evidence of underlying vascular lesion that would predispose to hemorrhage.   Brain compression  -Due to stroke and noted on imaging.  4th ventricle compression specifically noted  -Likely etiology is hypertension  -Repeat CTH stable   IVH (intraventricular hemorrhage)  See ICH.        history of dementia who presents to [facility] for Cerebellar ICH with IVH. He presented to ED with family for increased confusion and decreased mental status. Per family  He has become quite confused and out of it over the last 2 days.  They reported he fell out of his chair yesterday     - NSGY and Vn following   - SBP <160  - CTH: - repeat in 6 hrs   - Q 1 vitals   - Q 1 neuro check   Vascular Neurology PN 12/10/2023                                      Neuro Critical Care Medicine H&P 12/4/2023   CT head without contrast  Stable left cerebellar intraparenchymal hemorrhage measuring 1.9 x 1.8 cm, previously 1.8 x 1.8 cm when measured along the same axis (series 303, image 16) with extension into the 4th ventricle.   Similar surrounding hypodensity suggesting mild surrounding edema.  No new hemorrhage elsewhere.       MRI Brain without contrast  Mild surrounding edema with mild deformity of the 4th ventricle again noted.  No hydrocephalus or overt herniation    Radiology Results 12/4/2023              Radiology results 12/8/2023     For reporting purposes, the definition for other diagnoses is interpreted as additional conditions that affect patient care in terms of requiring: clinical evaluation; or therapeutic treatment; or diagnostic procedures; or extended length of hospital stay; or increased nursing care and/or monitoring. (ICD-10-CM Official Guidelines for Coding and Reporting FY 2021)       After study, the diagnosis of cerebral edema is:    [  x ] Clinically significant diagnosis that was monitored and/or treated as follows   (please specify): the patient was admitted to the Neuro ICU for close monitoring.  Repeat imaging was obtained to assess for stabilization and verify no further swelling/compression of the 4th ventricle which could call for emergent Neurosurgical intervention._________________________________________     [   ] Present but was inherent to the stroke     [   ] Other explanation (please specify): _____________________________     [  ] Clinically Undetermined       Please document in your progress notes daily for the duration of treatment until resolved, and include in your discharge summary.    Reference:  ICD-10-CM Official Guidelines for Coding and Reporting FY 2021. (2020). Retrieved April 7, 2021, from https://www.cdc.gov/nchs/data/icd/10cmguidelines-FY2021.pdf?fbclid=[email or web address]       Form No. 33900
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
## 2023-12-11 NOTE — PT/OT/SLP PROGRESS
Physical Therapy Co-Treatment    Patient Name: Sheng Virgen   MRN: [id number]    Co-treatment performed for this visit due to patient need for two skilled therapists to ensure patient and staff safety and to accommodate for patient activity tolerance/pain management   Recommendations:     Discharge Recommendations: Moderate Intensity Therapy  Discharge Equipment Recommendations: wheelchair, hospital bed, lift device  Barriers to discharge: Increased level of assist and Decreased caregiver support    Assessment:     Sheng Virgen is a 83 y.o. 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 He presents with the following impairments/functional limitations: weakness, impaired endurance, impaired self care skills, impaired functional mobility, gait instability, impaired balance, visual deficits, impaired cognition, impaired coordination, decreased lower extremity function, decreased upper extremity function, impaired fine motor, decreased safety awareness. Pt with improved tolerance to therapy treatment as compared to previous sessions. Pt with good arousal throughout and improved participation noted today. Pt continues to require increased assistance with functional mobility 2/2 poor motor initiation, AMS, poor postural stability, decreased activity tolerance, and decreased command following. Pt would continue to benefit from skilled acute PT in order to address current deficits and progress functional mobility.      Rehab Prognosis: Fair; patient continues to benefit from acute skilled PT services to address these deficits and reach maximum level of function.  Recent Surgery: * No surgery found *      Plan:     During this hospitalization, patient to be seen 3 x/week to address the identified rehab impairments via gait training, therapeutic activities, therapeutic exercises, neuromuscular re-education and progress toward the following goals:    Plan of Care Expires:   "01/04/24    Subjective     Chief Complaint: None verbalized  Patient/Family Comments/Goals: "Whatever y'all want"; pt's daughter: "We want to take him on a cruise when we get out of here."  Pain/Comfort:  Pain Rating 1: 0/10    Objective:     Communicated with RN prior to session. Patient found HOB elevated with bed alarm, Condom Catheter, peripheral IV, pulse ox (continuous), telemetry, SCD (Posey mitts, Avasys) upon PT entry to room.     General Precautions: Standard, aspiration, fall, seizure  Orthopedic Precautions: N/A  Braces: N/A    Functional Mobility:  Bed Mobility:  Verbal cues for technique and sequencing  Rolling Left:  total assistance  Rolling Right: total assistance  Supine to Sit: total assistance of 2 persons for LE management and trunk management  Sit to Supine: total assistance of 2 persons for LE management and trunk management  Transfers:    Sit to Stand:   x5 reps attempted with dependence of 2 persons 2/2 pt resisting STS and absent motor initiation; no hip clearance noted  x1 rep from EOB; maximal assistance of 2 persons with rolling walker with cues for hand placement and foot placement; full stand achieved   Verbal cues for improved use of momentum, upright posture, and glute activation  Balance:   Static Sitting: Poor, able to maintain for 10 minute(s) with CGA-total 2/2 fatigue and L lateral lean  Verbal cues for upright posture, reduction of lateral lean, improved use of BUE for support, and command following   Dynamic Sitting: Poor: Patient unable to accept challenge or move without loss of balance, total assistance  Pt completed facial grooming and hygiene with OT while seated at EOB  Static Standing: Poor, able to maintain for 20 seconds with dependence of 2 persons  Verbal cues for upright posture and increased hip extension  Dynamic Standing: not assessed this visit    AM-PAC 6 CLICK MOBILITY  Turning over in bed (including adjusting bedclothes, sheets and blankets)?: 1  Sitting " down on and standing up from a chair with arms (e.g., wheelchair, bedside commode, etc.): 1  Moving from lying on back to sitting on the side of the bed?: 1  Moving to and from a bed to a chair (including a wheelchair)?: 1  Need to walk in hospital room?: 1  Climbing 3-5 steps with a railing?: 1  Basic Mobility Total Score: 6     Therapeutic Activities and Exercises:  Patient educated on role of acute care PT and PT POC, safety while in hospital including calling nurse for mobility, and call light usage.  Answered all questions within PT scope of practice and addressed functional mobility concerns.  Pt educated on importance of maximal participation in therapy in order to reduce negative effects of prolonged sedentary positioning.   Condom cath came off following return to supine. No urine output lost. RN notified.  Pt provided with daily orientation.    Patient left with bed in chair position with all lines intact, call button in reach, RN notified, bed alarm on, family present, and posey mitts reapplied at end of session.    GOALS:   Multidisciplinary Problems       Physical Therapy Goals          Problem: Physical Therapy    Goal Priority Disciplines Outcome Goal Variances Interventions   Physical Therapy Goal     PT, PT/OT Ongoing, Progressing     Description: Goals to be met by: 2024     Patient will increase functional independence with mobility by performin. Supine to sit with Moderate Assistance  2. Sit to supine with Moderate Assistance  3. Sit to stand transfer with Moderate Assistance  4. Bed to chair transfer with Moderate Assistance using LRAD  5. Gait  x 20 feet with Moderate Assistance using LRAD.   6. Sitting at edge of bed x10 minutes with Contact Guard Assistance  7. Lower extremity exercise program x15 reps per handout, with assistance as needed                         Time Tracking:     PT Received On: 23  PT Start Time: 931     PT Stop Time: 956  PT Total Time (min): 25 min      Billable Minutes: Therapeutic Activity 10 and Neuromuscular Re-education 15      Treatment Type: Treatment  PT/PTA: PT     Number of PTA visits since last PT visit: 0     12/11/2023

## 2023-12-11 NOTE — PLAN OF CARE
CHW met with patient/family at bedside. Patient experience rounding completed and reviewed the following.     Do you know your discharge plan? Yes or No,    If yes, what is the plan?   Yes  SNF    Have you discussed your needs and preferences with your SW/CM?  Yes     If you are discharging home, do you have help at home?  Yes    Do you think you will need help additional at home at discharge?  No     Do you currently have difficulty keeping up with bills, affording medicine or buying food?   No    Assigned SW/CM notified of any patient/family needs or concerns. Appropriate resources provided to address patient's needs.  Nallely Adan W  Case Management  [phone]

## 2023-12-11 NOTE — PLAN OF CARE
12/11/23 1336   Post-Acute Status   Post-Acute Authorization Placement   Post-Acute Placement Status Pending post-acute provider review/more information requested   Discharge Delays (!) Payor Issues   Discharge Plan   Discharge Plan A Skilled Nursing Facility   Discharge Plan B Home Health     GEOVANNA spoke with angela Gee and she confirmed they want patient to go to [facility].  GEOVANNA reached out to Sunshine at  and she has requested updated information.  Patient still under review and they have not yet accepted or declined.  GEOVANNA sending out additional referrals.      Discharge Plan A and Plan B have been determined by review of patient's clinical status, future medical and therapeutic needs, and coverage/benefits for post-acute care in coordination with multidisciplinary team members.      3:10 PM  GEOVANNA received notification from [facility] at [facility] and they have declined patient.  GEOVANNA spoke with angela Gee and she requested a facility in either [place] or [place].  GEOVANNA sent out additiional referrals and pending acceptance at this time. Once accepted, GEOVANNA will submit for auth with [facility].      Nallely Qureshi, ALISA  [facility]  [phone]
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
## 2023-12-11 NOTE — ASSESSMENT & PLAN NOTE
Carries history of dementia. Oriented to self only at baseline per daughter. Prescribed Aricept and Namenda previously, though after pharmacy review, it appears he hasn't had any recent refill. Per daughters, is close to cognitive baseline now. On memantine while admitted.
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
## 2023-12-11 NOTE — ASSESSMENT & PLAN NOTE
Blood cultures from 12/3 growing micrococcus luteus, probable contaminant. However, also growing Rihzobium radiobacter. This may be seen in immunocompromised patients as an infectious organism. Patient remains afebrile and without leukocytosis. Discussed with infectious disease (Dr. Tamayo and Dr. Rollins)--due to lack of systemic signs, will repeat blood cultures, and observe for growth. If demonstrates signs of systemic infection, or repeat blood cultures demonstrate growth, will again discuss with ID. No growth on repeat blood cultures thus far.    Consulted ID, culture results suspected to be contaminate, antibiotics discontinued

## 2023-12-11 NOTE — ASSESSMENT & PLAN NOTE
83M with PMH of prostate cancer, HTN, and dementia, had presented with AMS, found to have L cerebellar ICH with extension into and compression of 4th ventricle. 12/3 blood cultures with micrococcus luteus and rhizobium radiobacter in 1/2 sets. Repeat on 12/8 negative. Started on cipro on 12/10. Has been afebrile, normal WBC.     Recommendations  12/3 blood cultures most likely contaminated  Favor stopping all antibiotics

## 2023-12-11 NOTE — PLAN OF CARE
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        Vitals:  Routine    Diet: cardiac diet  Diet recommendations:  Soft & Bite Sized Diet - IDDSI Level 6, Liquid Diet Level: Thin liquids - IDDSI Level 0   Aspiration Precautions: 1 bite/sip at a time, Alternating bites/sips, Assistance with meals, HOB to 90 degrees, Meds whole buried in puree, Monitor for s/s of aspiration, Remain upright 30 minutes post meal, Small bites/sips, and Standard aspiration precautions       Activities:   Up in a chair each morning as tolerated and Activity as tolerated    Goals of Care Treatment Preferences:  Code Status: Full Code      Labs:  per facility protocol    Nursing Precautions:  Fall and Pressure ulcer prevention    Consults:   PT to evaluate and treat- 3 times a week, OT to evaluate and treat- 3 times a week, ST to evaluate and treat- 3 times a week, and Nutrition to evaluate and recommend diet     Miscellaneous Care: Routine Skin for Bedridden Patients:  Apply moisture barrier cream  to all                   Diabetes Care:  Report CBG < 60 or > 350 to physician.      Medications: Discontinue all previous medication orders, if any. See new list below.     Medication List        START taking these medications      acetaminophen 325 MG tablet  Commonly known as: TYLENOL  Take 2 tablets (650 mg total) by mouth every 6 (six) hours as needed for Pain.     losartan 50 MG tablet  Commonly known as: COZAAR  Take 1 tablet (50 mg total) by mouth once daily.     melatonin 3 mg tablet  Commonly known as: MELATIN  Take 2 tablets (6 mg total) by mouth nightly.     NIFEdipine 60 MG (OSM) 24 hr tablet  Commonly known as: PROCARDIA-XL  Take 1 tablet (60 mg total) by mouth once daily.     QUEtiapine 50 MG tablet  Commonly known as: SEROQUEL  Take 1 tablet (50 mg total) by mouth every evening.     senna-docusate 8.6-50 mg 8.6-50 mg per tablet  Commonly known as: PERICOLACE  Take 1 tablet by mouth 2 (two) times daily as needed for Constipation.            CONTINUE taking these medications      memantine 10 MG Tab  Commonly known as: NAMENDA  Take 5 mg by mouth 2 (two) times daily.            STOP taking these medications      donepeziL 5 MG tablet  Commonly known as: ARICEPT     sildenafiL 100 MG tablet  Commonly known as: VIAGRA     tadalafiL 20 MG Tab  Commonly known as: CIALIS     vardenafiL 20 MG tablet  Commonly known as: LEVITRA                Immunizations Administered as of 12/11/2023       Name Date Dose VIS Date Route Exp Date    COVID-19, MRNA, LN-S, PF (Pfizer) (Purple Cap) 10/14/2021 0.3 mL 21032 Intramuscular --    Site: Left arm     : [employer]     Lot: [id number]     Comment: Adminis     COVID-19, MRNA, LN-S, PF (Pfizer) (Purple Cap) 8/18/2021 0.3 mL 40981 Intramuscular --    Site: Left arm     : [employer]     Lot: [id number]     Comment: Adminis                 _________________________________  Julia Santo PA-C  12/11/2023

## 2023-12-11 NOTE — SUBJECTIVE & OBJECTIVE
Neurologic Chief Complaint: ICH    Subjective:     Interval History: cipro discontinued per ID as culture results from 12/3 suspected to be likely contaminant, pending SNF placement    HPI, Past Medical, Family, and Social History remains the same as documented in the initial encounter.     Review of Systems   Unable to perform ROS: Dementia     Scheduled Meds:   heparin (porcine)  5,000 Units Subcutaneous Q8H    losartan  50 mg Oral Daily    melatonin  6 mg Oral Nightly    memantine  5 mg Oral BID    NIFEdipine  60 mg Oral Daily    QUEtiapine  50 mg Oral QHS    senna-docusate 8.6-50 mg  1 tablet Oral BID     Continuous Infusions:        PRN Meds:acetaminophen, hydrOXYzine HCL, labetalol, ondansetron    Objective:     Vital Signs (Most Recent):  Temp: 98.4 °F (36.9 °C) (12/11/23 1447)  Pulse: 80 (12/11/23 1447)  Resp: 16 (12/11/23 1447)  BP: 108/68 (12/11/23 1447)  SpO2: 95 % (12/11/23 1447)  BP Location: Right arm    Vital Signs Range (Last 24H):  Temp:  [97.7 °F (36.5 °C)-99 °F (37.2 °C)]   Pulse:  [77-88]   Resp:  [16-20]   BP: (108-173)/()   SpO2:  [94 %-100 %]   BP Location: Right arm       Physical Exam  Vitals reviewed.   Constitutional:       Appearance: He is ill-appearing.   HENT:      Head: Normocephalic and atraumatic.   Cardiovascular:      Rate and Rhythm: Normal rate.   Pulmonary:      Effort: Pulmonary effort is normal. No respiratory distress.   Skin:     General: Skin is warm and dry.   Neurological:      Mental Status: He is alert.              Neurological Exam:   LOC: alert  Attention Span: poor  Language: No aphasia  Articulation: mild dysarthria  Orientation: oriented to self only  Visual Fields: limited by patient being uncooperative with exam; does not appear to blink to threat when tested  EOM (CN III, IV, VI): Full/intact  Pupils (CN II, III): PERRL  Facial Movement (CN VII): right sided facial droop  Motor: Arm left  Paresis: 4/5  Leg left  Paresis: 4/5  Arm right  Paresis: 3/5  Leg  "right Paresis: 3/5  Cerebellum: difficult to test due to patient's lack of cooperativeness with exam  Localizes pain throughout all four extremities    Laboratory:  BMP:   Recent Labs   Lab 12/11/23 0229      K 3.7      CO2 29   BUN 17   CREATININE 1.0   CALCIUM 8.3*       CBC:   Recent Labs   Lab 12/11/23 0229   WBC 4.14   RBC 2.98*   HGB 9.0*   HCT 27.4*      MCV 92   MCH 30.2   MCHC 32.8       Lipid Panel:   No results for input(s): "CHOL", "LDLCALC", "HDL", "TRIG" in the last 168 hours.    Hgb A1C:   No results for input(s): "HGBA1C" in the last 168 hours.    TSH:   No results for input(s): "TSH" in the last 168 hours.      Diagnostic Results     Brain Imaging   MRI Brain: 12/9/23  Allowing for a difference in technique, similar appearance of left cerebellar hemorrhage with similar mild mass effect on the 4th ventricle.  No hydrocephalus.     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     Multiple chronic microhemorrhages as detailed above that may reflect combination of hypertensive hemorrhages and amyloid angiopathy.    CT Head 12/3/23  1. Left cerebellar intraparenchymal hemorrhage with intraventricular extension into the 4th ventricle.  2. No acute fracture or traumatic dislocation of the cervical spine.  3. Paranasal sinus disease.  4. Degenerative changes of the cervical spine most pronounced at C5-C6 with moderate spinal canal stenosis and severe bilateral neural foraminal narrowing.    CT Head 12/4/23: 4:01  Redemonstration of left cerebellar hemorrhage, unchanged in size and configuration from prior exam with continued interventricular extension.  No significant interval detrimental change compared to prior head CT.     CT Head 12/4/23: 13:47  Similar left cerebellar intraparenchymal hemorrhage with extension into the 4th ventricle.     Vessel Imaging   CTA Head 12/5/23  Stable left cerebellar hemorrhage, with " mild extension into the 4th ventricle. Scattered mild intracranial atherosclerosis with no evidence of aneurysm, significant stenosis, or occlusion. Atherosclerosis about the visualized extracranial vasculature, noting potential focal ulceration about the distal left cervical internal carotid artery.    Cardiac Imaging   Echo 12/5/23    Left Ventricle: The left ventricle is normal in size. Normal wall thickness. There is concentric remodeling. Normal wall motion. There is normal systolic function with a visually estimated ejection fraction of 60 - 65%. Diastolic function cannot be reliably determined in the presence of mitral valve disease.    Right Ventricle: Normal right ventricular cavity size. Wall thickness is normal. Right ventricle wall motion  is normal. Systolic function is normal.    Aortic Valve: The aortic valve is a trileaflet valve. There is mild aortic valve sclerosis. There is annular calcification present. There is mild aortic regurgitation.    Mitral Valve: There is moderate mitral annular calcification present. There is normal leaflet mobility. There is systolic anterior motion of the mitral valve. There is moderate stenosis. The mean pressure gradient across the mitral valve is 8 mmHg at a heart rate of 96 bpm. There is no significant regurgitation.    Tricuspid Valve: There is mild regurgitation.    Pulmonary Artery: The estimated pulmonary artery systolic pressure is 36 mmHg.    IVC/SVC: Normal venous pressure at 3 mmHg.    Pericardium: Left pleural effusion.

## 2023-12-11 NOTE — SUBJECTIVE & OBJECTIVE
Past Medical History:   Diagnosis Date    AR (allergic rhinitis)     Chronic hepatitis C without hepatic coma 8/1/2023    Hypertension     hereditary    Memory loss     Prostate cancer        History reviewed. No pertinent surgical history.    Review of patient's allergies indicates:   Allergen Reactions    Strawberries [strawberry] Hives, Itching and Rash     Strawberries make patient itch and brake  out in hives with a rash and makes lips swell up.        Medications:  Medications Prior to Admission   Medication Sig    donepezil (ARICEPT) 5 MG tablet Take 1 tablet (5 mg total) by mouth once daily.    memantine (NAMENDA) 10 MG Tab Take 5 mg by mouth 2 (two) times daily.    [DISCONTINUED] sildenafil (VIAGRA) 100 MG tablet Take 1 tablet (100 mg total) by mouth daily as needed for Erectile Dysfunction.    [DISCONTINUED] tadalafil (CIALIS) 20 MG Tab Take 1 tablet (20 mg total) by mouth daily as needed. Take 1 hour before intercourse    [DISCONTINUED] vardenafil (LEVITRA) 20 MG tablet Take 1 tablet (20 mg total) by mouth daily as needed for Erectile Dysfunction. Take 1 hour before intercourse.     Antibiotics (From admission, onward)      Start     Stop Route Frequency Ordered    12/10/23 1230  ciprofloxacin HCl tablet 500 mg         12/24/23 0859 Oral Every 12 hours 12/10/23 1124          Antifungals (From admission, onward)      None          Antivirals (From admission, onward)      None             Immunization History   Administered Date(s) Administered    COVID-19, MRNA, LN-S, PF (Pfizer) (Gray Cap) 04/28/2022    COVID-19, MRNA, LN-S, PF (Pfizer) (Purple Cap) 08/18/2021, 10/14/2021       Family History       Problem Relation (Age of Onset)    Colon cancer Father, Sister    Hypertension Mother          Social History     Socioeconomic History    Marital status:    Tobacco Use    Smoking status: Every Day     Current packs/day: 0.25     Types: Cigarettes    Smokeless tobacco: Never   Substance and Sexual  Activity    Alcohol use: No    Drug use: No    Sexual activity: Yes     Partners: Female     Birth control/protection: None     Social Determinants of Health     Financial Resource Strain: Low Risk  (12/4/2023)    Overall Financial Resource Strain (CARDIA)     Difficulty of Paying Living Expenses: Not hard at all   Food Insecurity: No Food Insecurity (12/4/2023)    Hunger Vital Sign     Worried About Running Out of Food in the Last Year: Never true     Ran Out of Food in the Last Year: Never true   Transportation Needs: No Transportation Needs (12/4/2023)    PRAPARE - Transportation     Lack of Transportation (Medical): No     Lack of Transportation (Non-Medical): No   Physical Activity: Inactive (12/4/2023)    Exercise Vital Sign     Days of Exercise per Week: 0 days     Minutes of Exercise per Session: 0 min   Social Connections: Socially Isolated (12/4/2023)    Social Connection and Isolation Panel [NHANES]     Frequency of Communication with Friends and Family: Never     Frequency of Social Gatherings with Friends and Family: Never     Attends Baptism Services: Never     Active Member of Clubs or Organizations: No     Attends Club or Organization Meetings: Never     Marital Status:    Housing Stability: Low Risk  (12/4/2023)    Housing Stability Vital Sign     Unable to Pay for Housing in the Last Year: No     Number of Places Lived in the Last Year: 1     Unstable Housing in the Last Year: No     Review of Systems   Unable to perform ROS: Acuity of condition     Objective:     Vital Signs (Most Recent):  Temp: 98.4 °F (36.9 °C) (12/11/23 1447)  Pulse: 80 (12/11/23 1447)  Resp: 16 (12/11/23 1447)  BP: 108/68 (12/11/23 1447)  SpO2: 95 % (12/11/23 1447) Vital Signs (24h Range):  Temp:  [97.7 °F (36.5 °C)-99 °F (37.2 °C)] 98.4 °F (36.9 °C)  Pulse:  [77-92] 80  Resp:  [16-20] 16  SpO2:  [94 %-100 %] 95 %  BP: (108-173)/() 108/68     Weight: 64.4 kg (142 lb)  Body mass index is 19.26  kg/m².    Estimated Creatinine Clearance: 51 mL/min (based on SCr of 1 mg/dL).     Physical Exam  Vitals reviewed.   Constitutional:       General: He is not in acute distress.     Appearance: Normal appearance. He is not ill-appearing.   HENT:      Head: Normocephalic and atraumatic.   Eyes:      Extraocular Movements: Extraocular movements intact.      Conjunctiva/sclera: Conjunctivae normal.   Cardiovascular:      Rate and Rhythm: Normal rate and regular rhythm.      Heart sounds: No murmur heard.  Pulmonary:      Effort: Pulmonary effort is normal. No respiratory distress.      Breath sounds: Normal breath sounds.   Abdominal:      General: Abdomen is flat. Bowel sounds are normal.      Palpations: Abdomen is soft.   Skin:     General: Skin is warm and dry.   Neurological:      Mental Status: He is alert.          Significant Labs: All pertinent labs within the past 24 hours have been reviewed.  Recent Lab Results         12/11/23  0229        Albumin 2.6       ALP 92       ALT 16       Anion Gap 7       AST 42       Baso # 0.02       Basophil % 0.5       BILIRUBIN TOTAL 0.3  Comment: For infants and newborns, interpretation of results should be based  on gestational age, weight and in agreement with clinical  observations.    Premature Infant recommended reference ranges:  Up to 24 hours.............<8.0 mg/dL  Up to 48 hours............<12.0 mg/dL  3-5 days..................<15.0 mg/dL  6-29 days.................<15.0 mg/dL         BUN 17       Calcium 8.3       Chloride 103       CO2 29       Creatinine 1.0       Differential Method Automated       eGFR >60.0       Eos # 0.0       Eosinophil % 0.7       Glucose 77       Gran # (ANC) 2.5       Gran % 59.6       Hematocrit 27.4       Hemoglobin 9.0       Immature Grans (Abs) 0.02  Comment: Mild elevation in immature granulocytes is non specific and   can be seen in a variety of conditions including stress response,   acute inflammation, trauma and pregnancy.  Correlation with other   laboratory and clinical findings is essential.         Immature Granulocytes 0.5       Lymph # 1.2       Lymph % 29.5       Magnesium  2.1       MCH 30.2       MCHC 32.8       MCV 92       Mono # 0.4       Mono % 9.2       MPV 10.2       nRBC 0       Phosphorus Level 2.2       Platelet Count 173       Potassium 3.7       PROTEIN TOTAL 6.6       RBC 2.98       RDW 16.1       Sodium 139       WBC 4.14               Significant Imaging: I have reviewed all pertinent imaging results/findings within the past 24 hours.

## 2023-12-11 NOTE — HPI
"Mr. Virgen is a 83M with PMH of prostate cancer, HTN, and dementia, had presented with AMS, found to have L cerebellar ICH with extension into and compression of 4th ventricle. Infectious disease consulted for "clinical need for antibiotics with recent positive blood culture".     12/3 blood cultures with micrococcus luteus and rhizobium radiobacter in 1/2 sets. Repeat on 12/8 negative. Started on cipro on 12/10. Has been afebrile, normal WBC.   "

## 2023-12-11 NOTE — PT/OT/SLP PROGRESS
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  He presents with the following performance deficits affecting function are weakness, impaired endurance, impaired self care skills, decreased lower extremity function, impaired functional mobility, decreased safety awareness, gait instability, impaired balance, impaired cognition, abnormal tone, decreased coordination, impaired coordination.     Rehab Prognosis:  Good; patient would benefit from acute skilled OT services to address these deficits and reach maximum level of function.       Plan:     Patient to be seen 3 x/week to address the above listed problems via self-care/home management, therapeutic exercises, therapeutic activities, neuromuscular re-education  Plan of Care Expires: 01/04/24  Plan of Care Reviewed with: patient, daughter    Subjective     Chief Complaint: patient disoriented and none stated  Patient/Family Comments/goals: daughters would like patient to get better  Pain/Comfort:  Pain Rating 1: 0/10  Pain Rating Post-Intervention 1: 0/10    Objective:     Communicated with: nurse bhavani prior to session.  Patient found HOB elevated with telemetry, restraints, Condom Catheter, bed alarm (AVASYS) upon OT entry to room.    General Precautions: Standard, aspiration, seizure, fall    Orthopedic Precautions:N/A  Braces: N/A  Respiratory Status: Room air     Occupational Performance:     Bed  Mobility:    Patient completed Rolling/Turning to Left with  total assistance  Patient completed Rolling/Turning to Right with total assistance  Patient completed Scooting/Bridging with maximal assistance  Patient completed Supine to Sit with total assistance and 2 persons  Patient completed Sit to Supine with total assistance and 2 persons     Functional Mobility/Transfers:  Patient completed Sit <> Stand Transfer with dependence and of 2 persons  with  rolling walker , poor initiation and significant extensor tone  X1 trial with improved initiation requiring Max(A) of 2 persons using RW  Functional Mobility: Static sitting balance EOB Max(A) with brief periods of CGA during g/h task  L lateral lean but redirectable to prevent R UE pushing    Activities of Daily Living:  Grooming: contact guard assistance facial and ear hygiene seated EOB      AMPAC 6 Click ADL: 6    Treatment & Education:  Patient/family educated on OT POC, goals, and current progress. Patient with improved alertness today but continues to be disoriented with poor simple command following requiring max verbal and tactile cues to initiate and sustain attention to task. Addressed all patient/family questions/concerns within [facility] scope of practice. Co-treatment performed with PT due to patient's complexity and benefit of 2 skilled therapists to facilitate functional and safe occupational performance, accommodate patient's activity tolerance, and maximize patient's participation in therapy.     Patient left HOB elevated with all lines intact, call button in reach, bed alarm on, restraints reapplied at end of session, nurse notified, and daughters present    GOALS:   Multidisciplinary Problems       Occupational Therapy Goals          Problem: Occupational Therapy    Goal Priority Disciplines Outcome Interventions   Occupational Therapy Goal     OT, PT/OT Ongoing, Progressing    Description: Goals to be met by: 1/4/23     Patient will increase  functional independence with ADLs by performing:    UE Dressing with Moderate Assistance.  LE Dressing with Moderate Assistance.  Grooming while seated with Moderate Assistance.  Toileting from bedside commode with Moderate Assistance for hygiene and clothing management.   Stand pivot transfers with Moderate Assistance.  Toilet transfer to bedside commode with Moderate Assistance.                         Time Tracking:     OT Date of Treatment: 12/11/23  OT Start Time: 0931  OT Stop Time: 0956  OT Total Time (min): 25 min    Billable Minutes:Self Care/Home Management 10  Therapeutic Activity 15    OT/IDA: IDA     Number of IDA visits since last OT visit: 1    12/11/2023

## 2023-12-11 NOTE — PLAN OF CARE
Problem: Fall Injury Risk  Goal: Absence of Fall and Fall-Related Injury  Outcome: Ongoing, Progressing  Intervention: Promote Injury-Free Environment  Flowsheets (Taken 12/11/2023 1701)  Safety Promotion/Fall Prevention:   assistive device/personal item within reach   bed alarm set     Problem: Pain (Stroke, Hemorrhagic)  Goal: Acceptable Pain Control  Outcome: Ongoing, Progressing  Intervention: Monitor and Manage Pain  Flowsheets (Taken 12/11/2023 1701)  Pain Management Interventions: care clustered

## 2023-12-11 NOTE — NURSING
Nurses Note -- 4 Eyes      12/11/2023   2:55 AM      Skin assessed during: Q Shift Change      [] No Altered Skin Integrity Present    []Prevention Measures Documented      [] Yes- Altered Skin Integrity Present or Discovered   [] LDA Added if Not in Epic (Describe Wound)   [x] New Altered Skin Integrity was Present on Admit and Documented in LDA   [] Wound Image Taken    Wound Care Consulted? Yes    Attending Nurse:  Tavon Peoples RN/Staff Member:  RAUL Dodd

## 2023-12-11 NOTE — PLAN OF CARE
Problem: Fall Injury Risk  Goal: Absence of Fall and Fall-Related Injury  Outcome: Ongoing, Progressing     Problem: Restraint, Nonbehavioral (Nonviolent)  Goal: Absence of Harm or Injury  Outcome: Ongoing, Progressing     Problem: Infection  Goal: Absence of Infection Signs and Symptoms  Outcome: Ongoing, Progressing     Problem: Adult Inpatient Plan of Care  Goal: Plan of Care Review  Outcome: Ongoing, Progressing  Goal: Patient-Specific Goal (Individualized)  Outcome: Ongoing, Progressing  Goal: Absence of Hospital-Acquired Illness or Injury  Outcome: Ongoing, Progressing  Goal: Optimal Comfort and Wellbeing  Outcome: Ongoing, Progressing  Goal: Readiness for Transition of Care  Outcome: Ongoing, Progressing     Problem: Adjustment to Illness (Stroke, Hemorrhagic)  Goal: Optimal Coping  Outcome: Ongoing, Progressing     Problem: Bowel Elimination Impaired (Stroke, Hemorrhagic)  Goal: Effective Bowel Elimination  Outcome: Ongoing, Progressing     Problem: Cognitive Impairment (Stroke, Hemorrhagic)  Goal: Optimal Cognitive Function  Outcome: Ongoing, Progressing     Problem: Functional Ability Impaired (Stroke, Hemorrhagic)  Goal: Optimal Functional Ability  Outcome: Ongoing, Progressing     Problem: Swallowing Impairment (Stroke, Hemorrhagic)  Goal: Optimal Eating and Swallowing Without Aspiration  Outcome: Ongoing, Progressing     Problem: Impaired Wound Healing  Goal: Optimal Wound Healing  Outcome: Ongoing, Progressing     Problem: Skin Injury Risk Increased  Goal: Skin Health and Integrity  Outcome: Ongoing, Progressing  POC reviewed with patient at bedside. VSS, NADN. Bilateral soft mitten restraints remains in place. Telesitter at bedside. Pt had a bowel this shift. Rotation therapy in place, turning and repositioning pt q2h. Incontinent skin care performed. Uneventful shift, current tx plan ongoing.

## 2023-12-12 PROBLEM — E83.39 HYPOPHOSPHATEMIA: Status: ACTIVE | Noted: 2023-12-12

## 2023-12-12 LAB
ALBUMIN SERPL BCP-MCNC: 2.6 G/DL (ref 3.5–5.2)
ALP SERPL-CCNC: 63 U/L (ref 55–135)
ALT SERPL W/O P-5'-P-CCNC: 17 U/L (ref 10–44)
ANION GAP SERPL CALC-SCNC: 7 MMOL/L (ref 8–16)
AST SERPL-CCNC: 32 U/L (ref 10–40)
BASOPHILS # BLD AUTO: 0.01 K/UL (ref 0–0.2)
BASOPHILS NFR BLD: 0.2 % (ref 0–1.9)
BILIRUB SERPL-MCNC: 0.4 MG/DL (ref 0.1–1)
BUN SERPL-MCNC: 18 MG/DL (ref 8–23)
CALCIUM SERPL-MCNC: 8.5 MG/DL (ref 8.7–10.5)
CHLORIDE SERPL-SCNC: 105 MMOL/L (ref 95–110)
CO2 SERPL-SCNC: 27 MMOL/L (ref 23–29)
CREAT SERPL-MCNC: 0.8 MG/DL (ref 0.5–1.4)
DIFFERENTIAL METHOD: ABNORMAL
EOSINOPHIL # BLD AUTO: 0 K/UL (ref 0–0.5)
EOSINOPHIL NFR BLD: 0.9 % (ref 0–8)
ERYTHROCYTE [DISTWIDTH] IN BLOOD BY AUTOMATED COUNT: 16.5 % (ref 11.5–14.5)
EST. GFR  (NO RACE VARIABLE): >60 ML/MIN/1.73 M^2
GLUCOSE SERPL-MCNC: 68 MG/DL (ref 70–110)
HCT VFR BLD AUTO: 30.4 % (ref 40–54)
HGB BLD-MCNC: 9.8 G/DL (ref 14–18)
IMM GRANULOCYTES # BLD AUTO: 0.01 K/UL (ref 0–0.04)
IMM GRANULOCYTES NFR BLD AUTO: 0.2 % (ref 0–0.5)
LYMPHOCYTES # BLD AUTO: 1.1 K/UL (ref 1–4.8)
LYMPHOCYTES NFR BLD: 24.5 % (ref 18–48)
MAGNESIUM SERPL-MCNC: 2.2 MG/DL (ref 1.6–2.6)
MCH RBC QN AUTO: 30.7 PG (ref 27–31)
MCHC RBC AUTO-ENTMCNC: 32.2 G/DL (ref 32–36)
MCV RBC AUTO: 95 FL (ref 82–98)
MONOCYTES # BLD AUTO: 0.4 K/UL (ref 0.3–1)
MONOCYTES NFR BLD: 8 % (ref 4–15)
NEUTROPHILS # BLD AUTO: 2.9 K/UL (ref 1.8–7.7)
NEUTROPHILS NFR BLD: 66.2 % (ref 38–73)
NRBC BLD-RTO: 0 /100 WBC
PHOSPHATE SERPL-MCNC: 3.3 MG/DL (ref 2.7–4.5)
PLATELET # BLD AUTO: 176 K/UL (ref 150–450)
PMV BLD AUTO: 10.5 FL (ref 9.2–12.9)
POTASSIUM SERPL-SCNC: 3.6 MMOL/L (ref 3.5–5.1)
PROT SERPL-MCNC: 6.6 G/DL (ref 6–8.4)
RBC # BLD AUTO: 3.19 M/UL (ref 4.6–6.2)
SODIUM SERPL-SCNC: 139 MMOL/L (ref 136–145)
WBC # BLD AUTO: 4.37 K/UL (ref 3.9–12.7)

## 2023-12-12 PROCEDURE — 97530 THERAPEUTIC ACTIVITIES: CPT

## 2023-12-12 PROCEDURE — 84100 ASSAY OF PHOSPHORUS: CPT

## 2023-12-12 PROCEDURE — 97535 SELF CARE MNGMENT TRAINING: CPT | Mod: CO

## 2023-12-12 PROCEDURE — 36415 COLL VENOUS BLD VENIPUNCTURE: CPT | Performed by: NURSE PRACTITIONER

## 2023-12-12 PROCEDURE — 25000003 PHARM REV CODE 250: Performed by: REGISTERED NURSE

## 2023-12-12 PROCEDURE — 92526 ORAL FUNCTION THERAPY: CPT

## 2023-12-12 PROCEDURE — 63600175 PHARM REV CODE 636 W HCPCS: Performed by: REGISTERED NURSE

## 2023-12-12 PROCEDURE — 83735 ASSAY OF MAGNESIUM: CPT

## 2023-12-12 PROCEDURE — 99233 SBSQ HOSP IP/OBS HIGH 50: CPT | Mod: GC,,, | Performed by: STUDENT IN AN ORGANIZED HEALTH CARE EDUCATION/TRAINING PROGRAM

## 2023-12-12 PROCEDURE — 11000001 HC ACUTE MED/SURG PRIVATE ROOM

## 2023-12-12 PROCEDURE — 25000003 PHARM REV CODE 250: Performed by: PHYSICIAN ASSISTANT

## 2023-12-12 PROCEDURE — 97112 NEUROMUSCULAR REEDUCATION: CPT

## 2023-12-12 PROCEDURE — 80053 COMPREHEN METABOLIC PANEL: CPT | Performed by: NURSE PRACTITIONER

## 2023-12-12 PROCEDURE — 85025 COMPLETE CBC W/AUTO DIFF WBC: CPT | Performed by: NURSE PRACTITIONER

## 2023-12-12 PROCEDURE — 97530 THERAPEUTIC ACTIVITIES: CPT | Mod: CO

## 2023-12-12 PROCEDURE — 99233 PR SUBSEQUENT HOSPITAL CARE,LEVL III: ICD-10-PCS | Mod: GC,,, | Performed by: STUDENT IN AN ORGANIZED HEALTH CARE EDUCATION/TRAINING PROGRAM

## 2023-12-12 PROCEDURE — 25000003 PHARM REV CODE 250

## 2023-12-12 RX ORDER — SODIUM,POTASSIUM PHOSPHATES 280-250MG
2 POWDER IN PACKET (EA) ORAL ONCE
Status: DISCONTINUED | OUTPATIENT
Start: 2023-12-12 | End: 2023-12-12

## 2023-12-12 RX ADMIN — QUETIAPINE FUMARATE 50 MG: 25 TABLET ORAL at 09:12

## 2023-12-12 RX ADMIN — HEPARIN SODIUM 5000 UNITS: 5000 INJECTION INTRAVENOUS; SUBCUTANEOUS at 05:12

## 2023-12-12 RX ADMIN — HEPARIN SODIUM 5000 UNITS: 5000 INJECTION INTRAVENOUS; SUBCUTANEOUS at 03:12

## 2023-12-12 RX ADMIN — MEMANTINE HYDROCHLORIDE 5 MG: 5 TABLET ORAL at 08:12

## 2023-12-12 RX ADMIN — HEPARIN SODIUM 5000 UNITS: 5000 INJECTION INTRAVENOUS; SUBCUTANEOUS at 10:12

## 2023-12-12 RX ADMIN — NIFEDIPINE 60 MG: 30 TABLET, FILM COATED, EXTENDED RELEASE ORAL at 08:12

## 2023-12-12 RX ADMIN — MEMANTINE HYDROCHLORIDE 5 MG: 5 TABLET ORAL at 09:12

## 2023-12-12 RX ADMIN — LOSARTAN POTASSIUM 50 MG: 50 TABLET, FILM COATED ORAL at 08:12

## 2023-12-12 RX ADMIN — Medication 6 MG: at 09:12

## 2023-12-12 RX ADMIN — SENNOSIDES AND DOCUSATE SODIUM 1 TABLET: 8.6; 5 TABLET ORAL at 09:12

## 2023-12-12 NOTE — PROGRESS NOTES
[facility] - Neurosurgery ([facility])  Adult Nutrition  Progress Note    SUMMARY       Recommendations    1.) Recommend continuing with regular diet/double portions and soft and bitesized diet- advance texture per SLP. - assist at mealtimes as needed.     2.) RD to continue to monitor and f/u.      Goals: Meet % EEN/EPN by follow-up date.  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 Caregiver was present in the room. Pt denies n/v/d/c. Caregiver endorses that pt has good appetite, and consumes % of the meals provided. Diet was advanced to Soft and Bite-sized on 12/8 w/ double portions. Pt does require assistance at mealtimes. RD to continue to monitor.  Nutrition Discharge Planning: Pending Clinical Course    Nutrition Risk Screen    Nutrition Risk Screen: no indicators present    Nutrition/Diet History    Patient Reported Diet/Restrictions/Preferences: other (see comments) (Intubated/sedated)  Spiritual, Cultural Beliefs, Orthodox Practices, Values that Affect Care: no  Food Allergies: other (see comments) (Strawberries)  Factors Affecting Nutritional Intake: chewing difficulties/inability to chew food    Anthropometrics    Temp: 97.8 °F (36.6 °C)  Height  Method: Estimated  Height: 6' (182.9 cm)  Height (inches): 72 in  Weight Method: Bed Scale  Weight: 64.4 kg (142 lb)  Weight (lb): 142 lb  Ideal Body Weight (IBW), Male: 178 lb  % Ideal Body Weight, Male (lb): 79.78 %  BMI (Calculated): 19.3  BMI Grade: 18.5-24.9 - normal    Lab/Procedures/Meds    Pertinent Labs Reviewed: reviewed  Pertinent Labs Comments: Gluc: 68, Ca: 8.5, alb: 2.6, tPRO: WNL  Pertinent Medications Reviewed: reviewed  Pertinent Medications Comments: Heparin, senna-docusate    Estimated/Assessed Needs    Weight Used For Calorie Calculations: 85.3 kg (188 lb)  Energy Calorie Requirements (kcal): 1982 kcal/d (MSJ x 1.25)  Energy Need Method: [place]-St Jeor    Protein Requirements: 77 g/d (0.9 g/kg)  Weight Used For Protein Calculations: 85.3 kg (188 lb)        RDA Method (mL): 1982    Nutrition Prescription Ordered    Current Diet Order: Soft and Bite-sized w/ double portions    Evaluation of Received Nutrient/Fluid Intake    I/O: -2.8L since 12/5  Energy Calories Required: meeting needs  Protein Required: meeting needs  Fluid Required:  (as per MD)  Comments: LBM 12/10  % Intake of Estimated Energy Needs: 75 - 100 %  % Meal Intake: 75 - 100 %    Nutrition Risk    Level of Risk/Frequency of Follow-up: low (1x/ week)     Monitor and Evaluation    Food and Nutrient Intake: energy intake, food and beverage intake  Food and Nutrient Adminstration: diet order  Physical Activity and Function: nutrition-related ADLs and IADLs, factors affecting access to physical activity  Anthropometric Measurements: weight, weight change, body mass index  Biochemical Data, Medical Tests and Procedures: lipid profile, inflammatory profile, glucose/endocrine profile, gastrointestinal profile, electrolyte and renal panel  Nutrition-Focused Physical Findings: skin, head and eyes, extremities, muscles and bones, overall appearance     Nutrition Follow-Up    RD Follow-up?: Yes

## 2023-12-12 NOTE — PLAN OF CARE
12/12/23 1234   Post-Acute Status   Post-Acute Authorization Placement   Post-Acute Placement Status Pending payor review/awaiting authorization (if required)   Discharge Delays (!) Post-Acute Set-up   Discharge Plan   Discharge Plan A Skilled Nursing Facility     [facility] does not have a bed. [facility]  also accepted and GEOVANNA left message for [place] asking her to move forward with admission.  GEOVANNA submitted for auth with [facility].      Discharge Plan A and Plan B have been determined by review of patient's clinical status, future medical and therapeutic needs, and coverage/benefits for post-acute care in coordination with multidisciplinary team members.      3:15 PM  [facility] and [facility].  She will contact angela Gee to complete ppw.  SW also spoke to [facility] to let her know they would be calling and answered all questions.  Anticipate discharge tomorrow.        Nallely Qureshi, ALISA  [facility]  [phone]

## 2023-12-12 NOTE — PT/OT/SLP PROGRESS
Physical Therapy Co-Treatment    Patient Name: Sheng Virgen   MRN: [id number]    Co-treatment performed for this visit due to patient need for two skilled therapists to ensure patient and staff safety and to accommodate for patient activity tolerance/pain management   Recommendations:     Discharge Recommendations: Moderate Intensity Therapy  Discharge Equipment Recommendations: wheelchair, lift device, hospital bed  Barriers to discharge: Increased level of assist, Inaccessible home, and Decreased caregiver support    Assessment:     Sheng Virgen is a 83 y.o. 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 He presents with the following impairments/functional limitations: weakness, impaired endurance, impaired sensation, impaired self care skills, impaired functional mobility, gait instability, impaired balance, impaired cognition, impaired coordination, decreased upper extremity function, decreased lower extremity function, decreased safety awareness, pain, impaired skin, impaired fine motor. Pt with fair tolerance to therapy session on this date. Pt continues to be limited by AMS, poor motor initiation, decreased command following, poor activity tolerance, and poor cooperation with therapists instructions. Pt continuing to require increased assist for all functional mobility at this time. Pt would continue to benefit from skilled acute PT in order to address current deficits and progress functional mobility.     Rehab Prognosis: Fair; patient continues to benefit from acute skilled PT services to address these deficits and reach maximum level of function.  Recent Surgery: * No surgery found *      Plan:     During this hospitalization, patient to be seen 3 x/week to address the identified rehab impairments via gait training, therapeutic activities, therapeutic exercises, neuromuscular re-education and progress toward the following goals:    Plan of Care  "Expires:  01/04/24    Subjective     Chief Complaint: None verbalized  Patient/Family Comments/Goals: "I don't know why y'all won't just leave me alone."  Pain/Comfort:  Pain Rating 1: 0/10    Objective:     Communicated with RN prior to session. Patient found HOB elevated with bed alarm, Condom Catheter, peripheral IV, pulse ox (continuous), telemetry, SCD (Posey mitts, Avasys) upon PT entry to room.     General Precautions: Standard, aspiration, fall  Orthopedic Precautions: N/A  Braces: N/A    Functional Mobility:  Bed Mobility: Verbal cues for sequencing and technique   Rolling Left:  total assistance  Rolling Right: total assistance  Supine to Sit: total assistance of 2 persons for LE management and trunk management with HOB elevated using bed rail  Sit to Supine: total assistance of 2 persons for LE management and trunk management with HOB flat  Transfers:   Sit to Stand: attempted x4 reps from EOB; no hip clearance noted on any rep 2/2 pt resisting STS  1st and 2nd: with RW, dependence of 2 persons  3rd and 4th: with no AD, dependence of 2 persons  Verbal cues for improved use of momentum, upright posture, and glute activation   Balance:   Static Sitting: Poor, able to maintain for 10 minute(s) with maximal assistance  Verbal cues for upright posture, improved maintenance of midline orientation, improved use of BUE for support, and command following   Dynamic Sitting: Poor: Patient unable to accept challenge or move without loss of balance, maximal assistance  Pt completed facial grooming with OT assist while seated at EOB  Static Standing: not assessed this visit  Dynamic Standing: not assessed this visit    AM-PAC 6 CLICK MOBILITY  Turning over in bed (including adjusting bedclothes, sheets and blankets)?: 1  Sitting down on and standing up from a chair with arms (e.g., wheelchair, bedside commode, etc.): 1  Moving from lying on back to sitting on the side of the bed?: 1  Moving to and from a bed to a chair " (including a wheelchair)?: 1  Need to walk in hospital room?: 1  Climbing 3-5 steps with a railing?: 1  Basic Mobility Total Score: 6     Therapeutic Activities and Exercises:  Patient educated on role of acute care PT and PT POC, safety while in hospital including calling nurse for mobility, and call light usage  Pt educated on importance of maximal participation in therapy session in order to reduce negative effects of prolonged sedentary positioning.   Answered all questions within PT scope of practice and addressed functional mobility concerns.  Pt soiled with incontinent BM at end of session. Pericare completed and chuckpads changed.     Patient left HOB elevated with all lines intact, call button in reach, RN notified, bed alarm on, daughter present, and posey mitts reapplied at end of session.    GOALS:   Multidisciplinary Problems       Physical Therapy Goals          Problem: Physical Therapy    Goal Priority Disciplines Outcome Goal Variances Interventions   Physical Therapy Goal     PT, PT/OT Ongoing, Progressing     Description: Goals to be met by: 2024     Patient will increase functional independence with mobility by performin. Supine to sit with Moderate Assistance  2. Sit to supine with Moderate Assistance  3. Sit to stand transfer with Moderate Assistance  4. Bed to chair transfer with Moderate Assistance using LRAD  5. Gait  x 20 feet with Moderate Assistance using LRAD.   6. Sitting at edge of bed x10 minutes with Contact Guard Assistance  7. Lower extremity exercise program x15 reps per handout, with assistance as needed                         Time Tracking:     PT Received On: 23  PT Start Time: 1158     PT Stop Time: 1225  PT Total Time (min): 27 min     Billable Minutes: Therapeutic Activity 15 and Neuromuscular Re-education 12      Treatment Type: Treatment  PT/PTA: PT     Number of PTA visits since last PT visit: 0     2023

## 2023-12-12 NOTE — PT/OT/SLP PROGRESS
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  He presents with the following performance deficits affecting function are weakness, impaired endurance, impaired self care skills, impaired functional mobility, gait instability, impaired balance, decreased safety awareness, decreased lower extremity function, decreased upper extremity function, impaired fine motor, impaired cognition, decreased ROM, decreased coordination, impaired coordination, visual deficits. Patient received alert but primarily limited by AMS and poor command following affecting occupational performance and functional transfer progression. Patient would benefit from continued OT services to address deficits and progress towards goals. Continue OT POC.    Rehab Prognosis:  Fair; patient would benefit from acute skilled OT services to address these deficits and reach maximum level of function.       Plan:     Patient to be seen 3 x/week to address the above listed problems via self-care/home management, therapeutic exercises, therapeutic activities, neuromuscular re-education  Plan of Care Expires: 01/04/24  Plan of Care Reviewed with: patient, daughter    Subjective     Chief Complaint: "It's cold!"  Patient/Family Comments/goals: get stronger  Pain/Comfort:  Pain Rating 1: 0/10  Pain Rating Post-Intervention 1: 0/10    Objective:     Communicated with: nurse bhavani prior to session.  Patient " found HOB elevated with bed alarm, Condom Catheter, SCD, telemetry, pulse ox (continuous) (Coalgate mitts; AVASYS) upon OT entry to room.    General Precautions: Standard, fall, aspiration    Orthopedic Precautions:N/A  Braces: N/A  Respiratory Status: Room air     Occupational Performance:     Bed Mobility:    Patient completed Rolling/Turning to Left with  total assistance  Patient completed Rolling/Turning to Right with total assistance  Patient completed Scooting/Bridging with total assistance  Patient completed Supine to Sit with total assistance and 2 persons  Patient completed Sit to Supine with total assistance and 2 persons     Functional Mobility/Transfers:  Patient completed Sit <> Stand Transfer with dependence and of 2 persons  with  rolling walker   X2 trials using RW; x2 trials using no AD; poor initiation   Functional Mobility: Static sitting balance Max(A) with brief periods of CGA; significant L lateral lean     Activities of Daily Living:  Grooming: minimum assistance facial hygiene seated EOB; Total(A) for oral hygiene using swab  Toileting: total assistance perirectal hygiene in side lying      AMPA 6 Click ADL: 6    Treatment & Education:  Patient/family educated on OT POC, goals, and current progress. Addressed all patient questions/concerns within [facility] scope of practice. Co-treatment performed with PT due to patient's complexity and benefit of 2 skilled therapists to facilitate functional and safe occupational performance, accommodate patient's activity tolerance, and maximize patient's participation in therapy.     Patient left HOB elevated with all lines intact, call button in reach, bed alarm on, and daughter present, nurse notified    GOALS:   Multidisciplinary Problems       Occupational Therapy Goals          Problem: Occupational Therapy    Goal Priority Disciplines Outcome Interventions   Occupational Therapy Goal     OT, PT/OT Ongoing, Progressing    Description: Goals to be met by:  1/4/23     Patient will increase functional independence with ADLs by performing:    UE Dressing with Moderate Assistance.  LE Dressing with Moderate Assistance.  Grooming while seated with Moderate Assistance.  Toileting from bedside commode with Moderate Assistance for hygiene and clothing management.   Stand pivot transfers with Moderate Assistance.  Toilet transfer to bedside commode with Moderate Assistance.                         Time Tracking:     OT Date of Treatment: 12/12/23  OT Start Time: 1158  OT Stop Time: 1225  OT Total Time (min): 27 min    Billable Minutes:Self Care/Home Management 17  Therapeutic Activity 10    OT/IDA: IDA     Number of IDA visits since last OT visit: 2    12/12/2023
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
## 2023-12-12 NOTE — NURSING
Nurses Note -- 4 Eyes      12/12/2023   4:16 AM      Skin assessed during: Q Shift Change      [] No Altered Skin Integrity Present    [x]Prevention Measures Documented      [x] Yes- Altered Skin Integrity Present or Discovered   [] LDA Added if Not in Epic (Describe Wound)   [x] New Altered Skin Integrity was Present on Admit and Documented in LDA   [] Wound Image Taken    Wound Care Consulted? no    Attending Nurse:  Elder Peoples RN/Staff Member:  RAUL Montes De Oca

## 2023-12-12 NOTE — PLAN OF CARE
Recommendations     1.) Recommend continuing with regular diet/double portions and soft and bitesized diet- advance texture per SLP. - assist at mealtimes as needed.      2.) RD to continue to monitor and f/u.        Goals: Meet % EEN/EPN by follow-up date.  Nutrition Goal Status: goal met  Communication of RD Recs: other (comment) (POC)

## 2023-12-12 NOTE — SUBJECTIVE & OBJECTIVE
Neurologic Chief Complaint: ICH    Subjective:     Interval History: 12/12: Abx discontinued as blood culture likely contaminant. Still pending SNF placement. TONYEON. AF, 108-173 SBP. Isolated hypertension episodes likely 2/2 pain / agitation. Will trial out of mittens today.    HPI, Past Medical, Family, and Social History remains the same as documented in the initial encounter.     Review of Systems   Unable to perform ROS: Dementia     Scheduled Meds:   heparin (porcine)  5,000 Units Subcutaneous Q8H    losartan  50 mg Oral Daily    melatonin  6 mg Oral Nightly    memantine  5 mg Oral BID    NIFEdipine  60 mg Oral Daily    QUEtiapine  50 mg Oral QHS    senna-docusate 8.6-50 mg  1 tablet Oral BID     Continuous Infusions:        PRN Meds:acetaminophen, hydrOXYzine HCL, labetalol, ondansetron    Objective:     Vital Signs (Most Recent):  Temp: 97.8 °F (36.6 °C) (12/12/23 1201)  Pulse: 79 (12/12/23 1201)  Resp: 18 (12/12/23 1201)  BP: 120/83 (12/12/23 1201)  SpO2: 96 % (12/12/23 1201)  BP Location: Right arm    Vital Signs Range (Last 24H):  Temp:  [97.8 °F (36.6 °C)-98.4 °F (36.9 °C)]   Pulse:  [71-87]   Resp:  [16-18]   BP: (108-177)/()   SpO2:  [95 %-100 %]   BP Location: Right arm       Physical Exam  Vitals reviewed.   Constitutional:       Appearance: He is ill-appearing.   HENT:      Head: Normocephalic and atraumatic.   Cardiovascular:      Rate and Rhythm: Normal rate.   Pulmonary:      Effort: Pulmonary effort is normal. No respiratory distress.   Skin:     General: Skin is warm and dry.   Neurological:      Mental Status: He is alert.              Neurological Exam:   LOC: alert  Attention Span: poor  Language: No aphasia  Articulation: mild dysarthria  Orientation: oriented to self only  Visual Fields: limited by patient being uncooperative with exam; does not appear to blink to threat when tested  EOM (CN III, IV, VI): Full/intact  Pupils (CN II, III): PERRL  Facial Movement (CN VII): right sided  "facial droop  Motor: Arm left  Paresis: 4/5  Leg left  Paresis: 4/5  Arm right  Paresis: 3/5  Leg right Paresis: 3/5  Cerebellum: difficult to test due to patient's lack of cooperativeness with exam  Localizes pain throughout all four extremities    Laboratory:  BMP:   Recent Labs   Lab 12/12/23  0736      K 3.6      CO2 27   BUN 18   CREATININE 0.8   CALCIUM 8.5*       CBC:   Recent Labs   Lab 12/12/23  0736   WBC 4.37   RBC 3.19*   HGB 9.8*   HCT 30.4*      MCV 95   MCH 30.7   MCHC 32.2       Lipid Panel:   No results for input(s): "CHOL", "LDLCALC", "HDL", "TRIG" in the last 168 hours.    Hgb A1C:   No results for input(s): "HGBA1C" in the last 168 hours.    TSH:   No results for input(s): "TSH" in the last 168 hours.      Diagnostic Results     Brain Imaging   MRI Brain: 12/9/23  Allowing for a difference in technique, similar appearance of left cerebellar hemorrhage with similar mild mass effect on the 4th ventricle.  No hydrocephalus.     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     Multiple chronic microhemorrhages as detailed above that may reflect combination of hypertensive hemorrhages and amyloid angiopathy.    CT Head 12/3/23  1. Left cerebellar intraparenchymal hemorrhage with intraventricular extension into the 4th ventricle.  2. No acute fracture or traumatic dislocation of the cervical spine.  3. Paranasal sinus disease.  4. Degenerative changes of the cervical spine most pronounced at C5-C6 with moderate spinal canal stenosis and severe bilateral neural foraminal narrowing.    CT Head 12/4/23: 4:01  Redemonstration of left cerebellar hemorrhage, unchanged in size and configuration from prior exam with continued interventricular extension.  No significant interval detrimental change compared to prior head CT.     CT Head 12/4/23: 13:47  Similar left cerebellar intraparenchymal hemorrhage with extension into " the 4th ventricle.     Vessel Imaging   CTA Head 12/5/23  Stable left cerebellar hemorrhage, with mild extension into the 4th ventricle. Scattered mild intracranial atherosclerosis with no evidence of aneurysm, significant stenosis, or occlusion. Atherosclerosis about the visualized extracranial vasculature, noting potential focal ulceration about the distal left cervical internal carotid artery.    Cardiac Imaging   Echo 12/5/23    Left Ventricle: The left ventricle is normal in size. Normal wall thickness. There is concentric remodeling. Normal wall motion. There is normal systolic function with a visually estimated ejection fraction of 60 - 65%. Diastolic function cannot be reliably determined in the presence of mitral valve disease.    Right Ventricle: Normal right ventricular cavity size. Wall thickness is normal. Right ventricle wall motion  is normal. Systolic function is normal.    Aortic Valve: The aortic valve is a trileaflet valve. There is mild aortic valve sclerosis. There is annular calcification present. There is mild aortic regurgitation.    Mitral Valve: There is moderate mitral annular calcification present. There is normal leaflet mobility. There is systolic anterior motion of the mitral valve. There is moderate stenosis. The mean pressure gradient across the mitral valve is 8 mmHg at a heart rate of 96 bpm. There is no significant regurgitation.    Tricuspid Valve: There is mild regurgitation.    Pulmonary Artery: The estimated pulmonary artery systolic pressure is 36 mmHg.    IVC/SVC: Normal venous pressure at 3 mmHg.    Pericardium: Left pleural effusion.

## 2023-12-12 NOTE — PT/OT/SLP PROGRESS
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 male with oral and p haryngeal phases of swallow wfl.  Pt. With h/o dementia    Subjective     "I am terrific" per pt  Patient goals: did not state     Pain/Comfort:  Pain Rating 1: 0/10  Pain Rating Post-Intervention 1: 0/10    Respiratory Status: Room air    Objective:     Has the patient been evaluated by SLP for swallowing?   Yes  Keep patient NPO? No   Current Respiratory Status:        Pt. Seen at bedside and hob was raised to 90 degree angle.  Pt. Was observed self feeding 2 kirill crackers and 4 ounces of water via cup with a straw.  Oral and pharyngeal phases of swallow were wfl with no s/s of aspiration .  Cognition at baseline per family.    Goals:   Multidisciplinary Problems       SLP Goals          Problem: SLP    Goal Priority Disciplines Outcome   SLP Goal     SLP Ongoing, Progressing   Description: Goals due 12/12  1.  Tolerate mech soft diet - level 5 with no s/s of aspiration  2.  Participate in speech langauge cognitive eval                       Plan:     Patient to be seen:  3 x/week   Plan of Care expires:  01/02/24  Plan of Care reviewed with:  patient   SLP Follow-Up:  No       Discharge recommendations:  No Therapy Indicated   Barriers to Discharge:  Decreased Care Giver Support    Time Tracking:     SLP Treatment Date:   12/12/23  Speech Start Time:  0730  Speech Stop Time:  0740     Speech Total Time (min):  10 min    Billable Minutes: Treatment Swallowing Dysfunction " 10    12/12/2023

## 2023-12-12 NOTE — PLAN OF CARE
Problem: Fall Injury Risk  Goal: Absence of Fall and Fall-Related Injury  Outcome: Ongoing, Progressing     Problem: Restraint, Nonbehavioral (Nonviolent)  Goal: Absence of Harm or Injury  Outcome: Ongoing, Progressing     Problem: Cerebral Tissue Perfusion (Stroke, Hemorrhagic)  Goal: Optimal Cerebral Tissue Perfusion  Outcome: Ongoing, Progressing     Problem: Cognitive Impairment (Stroke, Hemorrhagic)  Goal: Optimal Cognitive Function  Outcome: Ongoing, Progressing     Problem: Communication Impairment (Stroke, Hemorrhagic)  Goal: Effective Communication Skills  Outcome: Ongoing, Progressing     Problem: Functional Ability Impaired (Stroke, Hemorrhagic)  Goal: Optimal Functional Ability  Outcome: Ongoing, Progressing     Problem: Skin Injury Risk Increased  Goal: Skin Health and Integrity  Outcome: Ongoing, Progressing

## 2023-12-13 PROBLEM — G81.90 HEMIPARESIS: Status: ACTIVE | Noted: 2023-12-13

## 2023-12-13 PROBLEM — G93.6 VASOGENIC CEREBRAL EDEMA: Status: ACTIVE | Noted: 2023-12-13

## 2023-12-13 PROBLEM — R47.1 DYSARTHRIA: Status: ACTIVE | Noted: 2023-12-13

## 2023-12-13 LAB
ALBUMIN SERPL BCP-MCNC: 2.7 G/DL (ref 3.5–5.2)
ALP SERPL-CCNC: 65 U/L (ref 55–135)
ALT SERPL W/O P-5'-P-CCNC: 21 U/L (ref 10–44)
ANION GAP SERPL CALC-SCNC: 8 MMOL/L (ref 8–16)
AST SERPL-CCNC: 38 U/L (ref 10–40)
BACTERIA BLD CULT: NORMAL
BACTERIA BLD CULT: NORMAL
BASOPHILS # BLD AUTO: 0.02 K/UL (ref 0–0.2)
BASOPHILS NFR BLD: 0.4 % (ref 0–1.9)
BILIRUB SERPL-MCNC: 0.4 MG/DL (ref 0.1–1)
BUN SERPL-MCNC: 18 MG/DL (ref 8–23)
CALCIUM SERPL-MCNC: 8.6 MG/DL (ref 8.7–10.5)
CHLORIDE SERPL-SCNC: 107 MMOL/L (ref 95–110)
CO2 SERPL-SCNC: 26 MMOL/L (ref 23–29)
CREAT SERPL-MCNC: 0.8 MG/DL (ref 0.5–1.4)
DIFFERENTIAL METHOD: ABNORMAL
EOSINOPHIL # BLD AUTO: 0.1 K/UL (ref 0–0.5)
EOSINOPHIL NFR BLD: 1.2 % (ref 0–8)
ERYTHROCYTE [DISTWIDTH] IN BLOOD BY AUTOMATED COUNT: 16.4 % (ref 11.5–14.5)
EST. GFR  (NO RACE VARIABLE): >60 ML/MIN/1.73 M^2
GLUCOSE SERPL-MCNC: 76 MG/DL (ref 70–110)
HCT VFR BLD AUTO: 30.1 % (ref 40–54)
HGB BLD-MCNC: 10.1 G/DL (ref 14–18)
IMM GRANULOCYTES # BLD AUTO: 0.01 K/UL (ref 0–0.04)
IMM GRANULOCYTES NFR BLD AUTO: 0.2 % (ref 0–0.5)
LYMPHOCYTES # BLD AUTO: 1.1 K/UL (ref 1–4.8)
LYMPHOCYTES NFR BLD: 21.6 % (ref 18–48)
MAGNESIUM SERPL-MCNC: 2.2 MG/DL (ref 1.6–2.6)
MCH RBC QN AUTO: 31.1 PG (ref 27–31)
MCHC RBC AUTO-ENTMCNC: 33.6 G/DL (ref 32–36)
MCV RBC AUTO: 93 FL (ref 82–98)
MONOCYTES # BLD AUTO: 0.4 K/UL (ref 0.3–1)
MONOCYTES NFR BLD: 7.6 % (ref 4–15)
NEUTROPHILS # BLD AUTO: 3.4 K/UL (ref 1.8–7.7)
NEUTROPHILS NFR BLD: 69 % (ref 38–73)
NRBC BLD-RTO: 0 /100 WBC
PHOSPHATE SERPL-MCNC: 3.6 MG/DL (ref 2.7–4.5)
PLATELET # BLD AUTO: 192 K/UL (ref 150–450)
PMV BLD AUTO: 9.8 FL (ref 9.2–12.9)
POTASSIUM SERPL-SCNC: 3.4 MMOL/L (ref 3.5–5.1)
PROT SERPL-MCNC: 6.9 G/DL (ref 6–8.4)
RBC # BLD AUTO: 3.25 M/UL (ref 4.6–6.2)
SODIUM SERPL-SCNC: 141 MMOL/L (ref 136–145)
WBC # BLD AUTO: 4.9 K/UL (ref 3.9–12.7)

## 2023-12-13 PROCEDURE — 99233 PR SUBSEQUENT HOSPITAL CARE,LEVL III: ICD-10-PCS | Mod: ,,, | Performed by: STUDENT IN AN ORGANIZED HEALTH CARE EDUCATION/TRAINING PROGRAM

## 2023-12-13 PROCEDURE — 99233 SBSQ HOSP IP/OBS HIGH 50: CPT | Mod: ,,, | Performed by: STUDENT IN AN ORGANIZED HEALTH CARE EDUCATION/TRAINING PROGRAM

## 2023-12-13 PROCEDURE — 11000001 HC ACUTE MED/SURG PRIVATE ROOM

## 2023-12-13 PROCEDURE — 85025 COMPLETE CBC W/AUTO DIFF WBC: CPT | Performed by: NURSE PRACTITIONER

## 2023-12-13 PROCEDURE — 97530 THERAPEUTIC ACTIVITIES: CPT | Mod: CO

## 2023-12-13 PROCEDURE — 25000003 PHARM REV CODE 250: Performed by: PHYSICIAN ASSISTANT

## 2023-12-13 PROCEDURE — 80053 COMPREHEN METABOLIC PANEL: CPT | Performed by: NURSE PRACTITIONER

## 2023-12-13 PROCEDURE — 63600175 PHARM REV CODE 636 W HCPCS

## 2023-12-13 PROCEDURE — 84100 ASSAY OF PHOSPHORUS: CPT

## 2023-12-13 PROCEDURE — 97112 NEUROMUSCULAR REEDUCATION: CPT | Mod: CO

## 2023-12-13 PROCEDURE — 63600175 PHARM REV CODE 636 W HCPCS: Performed by: REGISTERED NURSE

## 2023-12-13 PROCEDURE — 25000003 PHARM REV CODE 250: Performed by: REGISTERED NURSE

## 2023-12-13 PROCEDURE — 94761 N-INVAS EAR/PLS OXIMETRY MLT: CPT

## 2023-12-13 PROCEDURE — 36415 COLL VENOUS BLD VENIPUNCTURE: CPT | Performed by: NURSE PRACTITIONER

## 2023-12-13 PROCEDURE — 83735 ASSAY OF MAGNESIUM: CPT

## 2023-12-13 PROCEDURE — 25000003 PHARM REV CODE 250

## 2023-12-13 RX ORDER — POTASSIUM CHLORIDE 20 MEQ/1
20 TABLET, EXTENDED RELEASE ORAL ONCE
Status: COMPLETED | OUTPATIENT
Start: 2023-12-13 | End: 2023-12-13

## 2023-12-13 RX ORDER — LOSARTAN POTASSIUM 50 MG/1
100 TABLET ORAL DAILY
Status: DISCONTINUED | OUTPATIENT
Start: 2023-12-14 | End: 2023-12-15 | Stop reason: HOSPADM

## 2023-12-13 RX ORDER — LOSARTAN POTASSIUM 50 MG/1
50 TABLET ORAL ONCE
Status: COMPLETED | OUTPATIENT
Start: 2023-12-13 | End: 2023-12-13

## 2023-12-13 RX ADMIN — MEMANTINE HYDROCHLORIDE 5 MG: 5 TABLET ORAL at 10:12

## 2023-12-13 RX ADMIN — MEMANTINE HYDROCHLORIDE 5 MG: 5 TABLET ORAL at 08:12

## 2023-12-13 RX ADMIN — LOSARTAN POTASSIUM 50 MG: 50 TABLET, FILM COATED ORAL at 08:12

## 2023-12-13 RX ADMIN — HEPARIN SODIUM 5000 UNITS: 5000 INJECTION INTRAVENOUS; SUBCUTANEOUS at 03:12

## 2023-12-13 RX ADMIN — HEPARIN SODIUM 5000 UNITS: 5000 INJECTION INTRAVENOUS; SUBCUTANEOUS at 10:12

## 2023-12-13 RX ADMIN — LABETALOL HYDROCHLORIDE 10 MG: 5 INJECTION, SOLUTION INTRAVENOUS at 07:12

## 2023-12-13 RX ADMIN — QUETIAPINE FUMARATE 12.5 MG: 25 TABLET ORAL at 03:12

## 2023-12-13 RX ADMIN — LOSARTAN POTASSIUM 50 MG: 50 TABLET, FILM COATED ORAL at 12:12

## 2023-12-13 RX ADMIN — LABETALOL HYDROCHLORIDE 10 MG: 5 INJECTION, SOLUTION INTRAVENOUS at 10:12

## 2023-12-13 RX ADMIN — Medication 6 MG: at 10:12

## 2023-12-13 RX ADMIN — SENNOSIDES AND DOCUSATE SODIUM 1 TABLET: 8.6; 5 TABLET ORAL at 08:12

## 2023-12-13 RX ADMIN — SENNOSIDES AND DOCUSATE SODIUM 1 TABLET: 8.6; 5 TABLET ORAL at 10:12

## 2023-12-13 RX ADMIN — QUETIAPINE FUMARATE 50 MG: 25 TABLET ORAL at 10:12

## 2023-12-13 RX ADMIN — HEPARIN SODIUM 5000 UNITS: 5000 INJECTION INTRAVENOUS; SUBCUTANEOUS at 06:12

## 2023-12-13 RX ADMIN — NIFEDIPINE 60 MG: 30 TABLET, FILM COATED, EXTENDED RELEASE ORAL at 08:12

## 2023-12-13 RX ADMIN — POTASSIUM CHLORIDE 20 MEQ: 1500 TABLET, EXTENDED RELEASE ORAL at 05:12

## 2023-12-13 NOTE — ASSESSMENT & PLAN NOTE
Carries history of dementia. Oriented to self only at baseline per daughter. Prescribed Aricept and Namenda previously, though after pharmacy review, it appears he hasn't had any recent refill. Per daughters, is close to cognitive baseline now. On memantine while admitted.       hAssessment/Plan:    1  Well adult exam    2  Hx of basal cell carcinoma    3  Discoid lupus erythematosus       With rash again   Needs another biopsy   I can fill out fmla papers also    Subjective:      Patient ID: Cricket Luna is a 46 y o  female  HPI  Here to go over chronic issues and labs / imaging studies if applicable  The following portions of the patient's history were reviewed and updated as appropriate: allergies, current medications, past family history, past medical history, past social history, past surgical history and problem list     Review of Systems   Constitutional: Negative for fever and unexpected weight change  HENT: Negative for nosebleeds and trouble swallowing  Eyes: Negative for visual disturbance  Respiratory: Negative for chest tightness and shortness of breath  Cardiovascular: Negative for chest pain, palpitations and leg swelling  Gastrointestinal: Negative for abdominal pain, constipation, diarrhea and nausea  Endocrine: Negative for cold intolerance  Genitourinary: Negative for dysuria and urgency  Musculoskeletal: Positive for arthralgias  Negative for joint swelling and myalgias  Skin: Negative for rash  Neurological: Negative for tremors, seizures and syncope  Hematological: Does not bruise/bleed easily  Psychiatric/Behavioral: Negative for hallucinations and suicidal ideas  Objective:      /72   Pulse 79   Ht 5' (1 524 m)   Wt 59 4 kg (131 lb)   SpO2 99%   BMI 25 58 kg/m²     No visits with results within 2 Week(s) from this visit     Latest known visit with results is:   Lab on 05/13/2022   Component Date Value   • Hepatitis C Ab 05/13/2022 Non-reactive    • Mycoplasma pneumo IgG 05/13/2022 238 (H)    • Mycoplasma pneumo IgM 05/13/2022 <770    • EBV VCA IgG 05/13/2022 158 0 (H)    • EBV VCA IgM 05/13/2022 <36 0    • EBV Nuclear Ag Ab 05/13/2022 78 6 (H)    • INTERPRETATION 05/13/2022 Comment           Physical Exam  Vitals and nursing note reviewed  Constitutional:       Appearance: She is well-developed  HENT:      Head: Normocephalic and atraumatic  Mouth/Throat:      Comments: Many oral ulcers same  Cardiovascular:      Rate and Rhythm: Normal rate and regular rhythm  Heart sounds: Normal heart sounds  No murmur heard  Pulmonary:      Effort: Pulmonary effort is normal       Breath sounds: Normal breath sounds  No wheezing or rales  Abdominal:      General: Bowel sounds are normal  There is no distension  Palpations: Abdomen is soft  Tenderness: There is no abdominal tenderness  Musculoskeletal:         General: No tenderness  Normal range of motion  Cervical back: Normal range of motion and neck supple  Lymphadenopathy:      Cervical: No cervical adenopathy  Skin:     General: Skin is warm and dry  Capillary Refill: Capillary refill takes less than 2 seconds  Findings: No rash  Neurological:      Mental Status: She is alert and oriented to person, place, and time  Cranial Nerves: No cranial nerve deficit  Sensory: No sensory deficit  Motor: No abnormal muscle tone  Psychiatric:         Behavior: Behavior normal          Thought Content:  Thought content normal          Judgment: Judgment normal                      Juanita Ware MD  [place] [id number]

## 2023-12-13 NOTE — PLAN OF CARE
"Patient's son has been at the hospital throughout the day today and has told the charge RN, ASHLEY and unit manager that he wants his father to go to a facility on "[address]  GEOVANNA let nursing staff know that patient's son has several active complaints with EPS for "caregiver neglect" and that SW is working with patient's dtrs, Alisson and Gerard, to get the patient placed.  GEOVANNA let RN know that he could contact his sister Gerard and it would be her decision on whether she wanted to tell her brother about the discharge plan.      GEOVANNA also received email from patient advocacy and relayed this same information.  SW awaiting response from [facility] re:  ppw and admission.  Patient's auth has already been obtained.      Nallely Qureshi LMSW  [facility]  [phone]    "

## 2023-12-13 NOTE — ASSESSMENT & PLAN NOTE
-seroquel 50 qhs, starting low dose daytime seroquel  -melatonin qhs  -atarax low dose qhs prn  -delirium precautions
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
## 2023-12-13 NOTE — PLAN OF CARE
12/13/23 1159   Post-Acute Status   Post-Acute Authorization Placement   Post-Acute Placement Status Set-up Complete/Auth obtained   Discharge Delays (!) Patient and Family Barriers   Discharge Plan   Discharge Plan A Skilled Nursing Facility     Patient has been accepted at [facility] and auth has been received (cert #[id number]).  SW spoke with angela Gee and she has been unable to get bank statements, but her sister Alisson has all the account information and they are going to the facility to try and see what can be done and to fill out ppw.  GEOVANNA will continue to follow.      Discharge Plan A and Plan B have been determined by review of patient's clinical status, future medical and therapeutic needs, and coverage/benefits for post-acute care in coordination with multidisciplinary team members.    Nallely Qureshi, ALISA  [facility]  [phone]

## 2023-12-13 NOTE — SUBJECTIVE & OBJECTIVE
Neurologic Chief Complaint: ICH    Subjective:     Interval History: patient remained out of mittens overnight, telesitter discontinued, starting small dose of daytime seroquel, pending placement at [facility], family completing paperwork.    HPI, Past Medical, Family, and Social History remains the same as documented in the initial encounter.     Review of Systems   Unable to perform ROS: Dementia     Scheduled Meds:   heparin (porcine)  5,000 Units Subcutaneous Q8H    [START ON 12/14/2023] losartan  100 mg Oral Daily    melatonin  6 mg Oral Nightly    memantine  5 mg Oral BID    NIFEdipine  60 mg Oral Daily    QUEtiapine  12.5 mg Oral QAM    QUEtiapine  50 mg Oral QHS    senna-docusate 8.6-50 mg  1 tablet Oral BID     Continuous Infusions:        PRN Meds:acetaminophen, hydrOXYzine HCL, labetalol, ondansetron    Objective:     Vital Signs (Most Recent):  Temp: 98.1 °F (36.7 °C) (12/13/23 1151)  Pulse: 78 (12/13/23 1151)  Resp: 18 (12/13/23 1151)  BP: 100/62 (12/13/23 1231)  SpO2: 95 % (12/13/23 1151)  BP Location: Right arm    Vital Signs Range (Last 24H):  Temp:  [98 °F (36.7 °C)-98.4 °F (36.9 °C)]   Pulse:  [70-83]   Resp:  [17-20]   BP: (100-184)/()   SpO2:  [93 %-97 %]   BP Location: Right arm       Physical Exam  Vitals reviewed.   Constitutional:       Appearance: He is ill-appearing.   HENT:      Head: Normocephalic and atraumatic.   Cardiovascular:      Rate and Rhythm: Normal rate.   Pulmonary:      Effort: Pulmonary effort is normal. No respiratory distress.   Skin:     General: Skin is warm and dry.   Neurological:      Mental Status: He is alert.              Neurological Exam:   LOC: alert  Attention Span: poor  Language: No aphasia  Articulation: mild dysarthria  Orientation: oriented to self only  Visual Fields: limited by patient being uncooperative with exam; does not appear to blink to threat when tested  EOM (CN III, IV, VI): Full/intact  Pupils (CN II, III): PERRL  Facial Movement (CN  "VII): right sided facial droop  Motor: Arm left  Paresis: 4/5  Leg left  Paresis: 4/5  Arm right  Paresis: 3/5  Leg right Paresis: 3/5  Cerebellum: difficult to test due to patient's lack of cooperativeness with exam  Localizes pain throughout all four extremities    Laboratory:  BMP:   Recent Labs   Lab 12/13/23  0836      K 3.4*      CO2 26   BUN 18   CREATININE 0.8   CALCIUM 8.6*       CBC:   Recent Labs   Lab 12/13/23  0836   WBC 4.90   RBC 3.25*   HGB 10.1*   HCT 30.1*      MCV 93   MCH 31.1*   MCHC 33.6       Lipid Panel:   No results for input(s): "CHOL", "LDLCALC", "HDL", "TRIG" in the last 168 hours.    Hgb A1C:   No results for input(s): "HGBA1C" in the last 168 hours.    TSH:   No results for input(s): "TSH" in the last 168 hours.      Diagnostic Results     Brain Imaging   MRI Brain: 12/9/23  Allowing for a difference in technique, similar appearance of left cerebellar hemorrhage with similar mild mass effect on the 4th ventricle.  No hydrocephalus.     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     Multiple chronic microhemorrhages as detailed above that may reflect combination of hypertensive hemorrhages and amyloid angiopathy.    CT Head 12/3/23  1. Left cerebellar intraparenchymal hemorrhage with intraventricular extension into the 4th ventricle.  2. No acute fracture or traumatic dislocation of the cervical spine.  3. Paranasal sinus disease.  4. Degenerative changes of the cervical spine most pronounced at C5-C6 with moderate spinal canal stenosis and severe bilateral neural foraminal narrowing.    CT Head 12/4/23: 4:01  Redemonstration of left cerebellar hemorrhage, unchanged in size and configuration from prior exam with continued interventricular extension.  No significant interval detrimental change compared to prior head CT.     CT Head 12/4/23: 13:47  Similar left cerebellar intraparenchymal hemorrhage " with extension into the 4th ventricle.     Vessel Imaging   CTA Head 12/5/23  Stable left cerebellar hemorrhage, with mild extension into the 4th ventricle. Scattered mild intracranial atherosclerosis with no evidence of aneurysm, significant stenosis, or occlusion. Atherosclerosis about the visualized extracranial vasculature, noting potential focal ulceration about the distal left cervical internal carotid artery.    Cardiac Imaging   Echo 12/5/23    Left Ventricle: The left ventricle is normal in size. Normal wall thickness. There is concentric remodeling. Normal wall motion. There is normal systolic function with a visually estimated ejection fraction of 60 - 65%. Diastolic function cannot be reliably determined in the presence of mitral valve disease.    Right Ventricle: Normal right ventricular cavity size. Wall thickness is normal. Right ventricle wall motion  is normal. Systolic function is normal.    Aortic Valve: The aortic valve is a trileaflet valve. There is mild aortic valve sclerosis. There is annular calcification present. There is mild aortic regurgitation.    Mitral Valve: There is moderate mitral annular calcification present. There is normal leaflet mobility. There is systolic anterior motion of the mitral valve. There is moderate stenosis. The mean pressure gradient across the mitral valve is 8 mmHg at a heart rate of 96 bpm. There is no significant regurgitation.    Tricuspid Valve: There is mild regurgitation.    Pulmonary Artery: The estimated pulmonary artery systolic pressure is 36 mmHg.    IVC/SVC: Normal venous pressure at 3 mmHg.    Pericardium: Left pleural effusion.
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
## 2023-12-13 NOTE — PLAN OF CARE
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      Belia Whitlock NP  [facility]

## 2023-12-13 NOTE — PLAN OF CARE
Problem: Fall Injury Risk  Goal: Absence of Fall and Fall-Related Injury  Outcome: Ongoing, Progressing     Problem: Infection  Goal: Absence of Infection Signs and Symptoms  Outcome: Ongoing, Progressing     Problem: Adult Inpatient Plan of Care  Goal: Plan of Care Review  Outcome: Ongoing, Progressing  Goal: Absence of Hospital-Acquired Illness or Injury  Outcome: Ongoing, Progressing  Goal: Optimal Comfort and Wellbeing  Outcome: Ongoing, Progressing  Goal: Readiness for Transition of Care  Outcome: Ongoing, Progressing     Problem: Adjustment to Illness (Stroke, Hemorrhagic)  Goal: Optimal Coping  Outcome: Ongoing, Progressing     Problem: Bowel Elimination Impaired (Stroke, Hemorrhagic)  Goal: Effective Bowel Elimination  Outcome: Ongoing, Progressing     Problem: Cerebral Tissue Perfusion (Stroke, Hemorrhagic)  Goal: Optimal Cerebral Tissue Perfusion  Outcome: Ongoing, Progressing     Problem: Cognitive Impairment (Stroke, Hemorrhagic)  Goal: Optimal Cognitive Function  Outcome: Ongoing, Progressing     Problem: Communication Impairment (Stroke, Hemorrhagic)  Goal: Effective Communication Skills  Outcome: Ongoing, Progressing     Problem: Functional Ability Impaired (Stroke, Hemorrhagic)  Goal: Optimal Functional Ability  Outcome: Ongoing, Progressing     Problem: Pain (Stroke, Hemorrhagic)  Goal: Acceptable Pain Control  Outcome: Ongoing, Progressing     Problem: Respiratory Compromise (Stroke, Hemorrhagic)  Goal: Effective Oxygenation and Ventilation  Outcome: Ongoing, Progressing     Problem: Sensorimotor Impairment (Stroke, Hemorrhagic)  Goal: Improved Sensorimotor Function  Outcome: Ongoing, Progressing     Problem: Swallowing Impairment (Stroke, Hemorrhagic)  Goal: Optimal Eating and Swallowing Without Aspiration  Outcome: Ongoing, Progressing     Problem: Urinary Elimination Impaired (Stroke, Hemorrhagic)  Goal: Effective Urinary Elimination  Outcome: Ongoing, Progressing     Problem: Impaired Wound  Healing  Goal: Optimal Wound Healing  Outcome: Ongoing, Progressing     Problem: Skin Injury Risk Increased  Goal: Skin Health and Integrity  Outcome: Ongoing, Progressing    No c/o pain, no neuro changes, no acute events. Possible d/c today to SNF.

## 2023-12-13 NOTE — PT/OT/SLP PROGRESS
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  He presents with the following performance deficits affecting function are weakness, impaired endurance, impaired functional mobility, gait instability, impaired cognition, decreased lower extremity function, decreased upper extremity function, impaired balance, impaired self care skills, decreased coordination, decreased safety awareness, decreased ROM, impaired coordination, abnormal tone, impaired fine motor. Patient continues to be disoriented and unable to consistently follow one step commands. Patient would benefit from continued OT services to address deficits and progress towards goals. Continue OT POC.    Rehab Prognosis:  Fair; patient would benefit from acute skilled OT services to address these deficits and reach maximum level of function.       Plan:     Patient to be seen 3 x/week to address the above listed problems via self-care/home management, therapeutic exercises, therapeutic activities, neuromuscular re-education  Plan of Care Expires: 01/04/24  Plan of Care Reviewed with: patient    Subjective     Chief Complaint: patient pleasantly confused  Patient/Family Comments/goals: "I traveled all over the [place]!"; no family present during therapy  Pain/Comfort:  Pain Rating 1: 0/10  Pain Rating Post-Intervention 1: 0/10    Objective:     Communicated with: nurse mckee prior to session.  Patient found supine with bed alarm, telemetry, Condom Catheter, SCD upon OT " entry to room.    General Precautions: Standard, fall, aspiration    Orthopedic Precautions:N/A  Braces: N/A  Respiratory Status: Room air     Occupational Performance:     Bed Mobility:    Patient completed Scooting/Bridging with total assistance  Patient completed Supine to Sit with total assistance  Patient completed Sit to Supine with total assistance     Functional Mobility/Transfers:  Functional Mobility: Deferred standing trial 2/2 poor command following and significant posterior lean seated EOB requiring Total(A) with x1 brief period of close SBA with good core activation for static sitting balance    Activities of Daily Living:  Did not assess today. Patient unable to follow simple commands.       [facility] 6 Click ADL: 6    Treatment & Education:  Patient unable to retain education. Orienting questions performed and answered with no success. Addressed all patient needs within [facility] scope of practice.     Patient left HOB elevated with all lines intact, call button in reach, bed alarm on, and nurse notified    GOALS:   Multidisciplinary Problems       Occupational Therapy Goals          Problem: Occupational Therapy    Goal Priority Disciplines Outcome Interventions   Occupational Therapy Goal     OT, PT/OT Ongoing, Progressing    Description: Goals to be met by: 1/4/23     Patient will increase functional independence with ADLs by performing:    UE Dressing with Moderate Assistance.  LE Dressing with Moderate Assistance.  Grooming while seated with Moderate Assistance.  Toileting from bedside commode with Moderate Assistance for hygiene and clothing management.   Stand pivot transfers with Moderate Assistance.  Toilet transfer to bedside commode with Moderate Assistance.                         Time Tracking:     OT Date of Treatment: 12/13/23  OT Start Time: 1349  OT Stop Time: 1412  OT Total Time (min): 23 min    Billable Minutes:Therapeutic Activity 9  Neuromuscular Re-education 14    OT/IDA: IDA     Number  of IDA visits since last OT visit: 3    12/13/2023

## 2023-12-14 LAB
ALBUMIN SERPL BCP-MCNC: 3 G/DL (ref 3.5–5.2)
ALP SERPL-CCNC: 77 U/L (ref 55–135)
ALT SERPL W/O P-5'-P-CCNC: 26 U/L (ref 10–44)
ANION GAP SERPL CALC-SCNC: 10 MMOL/L (ref 8–16)
ANION GAP SERPL CALC-SCNC: 7 MMOL/L (ref 8–16)
AST SERPL-CCNC: 57 U/L (ref 10–40)
BASOPHILS # BLD AUTO: 0.03 K/UL (ref 0–0.2)
BASOPHILS NFR BLD: 0.4 % (ref 0–1.9)
BILIRUB SERPL-MCNC: 0.5 MG/DL (ref 0.1–1)
BUN SERPL-MCNC: 19 MG/DL (ref 8–23)
BUN SERPL-MCNC: 20 MG/DL (ref 8–23)
CALCIUM SERPL-MCNC: 8.7 MG/DL (ref 8.7–10.5)
CALCIUM SERPL-MCNC: 9 MG/DL (ref 8.7–10.5)
CHLORIDE SERPL-SCNC: 106 MMOL/L (ref 95–110)
CHLORIDE SERPL-SCNC: 107 MMOL/L (ref 95–110)
CO2 SERPL-SCNC: 25 MMOL/L (ref 23–29)
CO2 SERPL-SCNC: 26 MMOL/L (ref 23–29)
CREAT SERPL-MCNC: 0.8 MG/DL (ref 0.5–1.4)
CREAT SERPL-MCNC: 0.8 MG/DL (ref 0.5–1.4)
DIFFERENTIAL METHOD: ABNORMAL
EOSINOPHIL # BLD AUTO: 0.1 K/UL (ref 0–0.5)
EOSINOPHIL NFR BLD: 0.9 % (ref 0–8)
ERYTHROCYTE [DISTWIDTH] IN BLOOD BY AUTOMATED COUNT: 16.7 % (ref 11.5–14.5)
EST. GFR  (NO RACE VARIABLE): >60 ML/MIN/1.73 M^2
EST. GFR  (NO RACE VARIABLE): >60 ML/MIN/1.73 M^2
GLUCOSE SERPL-MCNC: 71 MG/DL (ref 70–110)
GLUCOSE SERPL-MCNC: 82 MG/DL (ref 70–110)
HCT VFR BLD AUTO: 33.8 % (ref 40–54)
HGB BLD-MCNC: 11 G/DL (ref 14–18)
IMM GRANULOCYTES # BLD AUTO: 0.02 K/UL (ref 0–0.04)
IMM GRANULOCYTES NFR BLD AUTO: 0.3 % (ref 0–0.5)
LYMPHOCYTES # BLD AUTO: 1.3 K/UL (ref 1–4.8)
LYMPHOCYTES NFR BLD: 16.9 % (ref 18–48)
MAGNESIUM SERPL-MCNC: 2.3 MG/DL (ref 1.6–2.6)
MCH RBC QN AUTO: 30.8 PG (ref 27–31)
MCHC RBC AUTO-ENTMCNC: 32.5 G/DL (ref 32–36)
MCV RBC AUTO: 95 FL (ref 82–98)
MONOCYTES # BLD AUTO: 0.5 K/UL (ref 0.3–1)
MONOCYTES NFR BLD: 6.2 % (ref 4–15)
NEUTROPHILS # BLD AUTO: 5.9 K/UL (ref 1.8–7.7)
NEUTROPHILS NFR BLD: 75.3 % (ref 38–73)
NRBC BLD-RTO: 0 /100 WBC
PHOSPHATE SERPL-MCNC: 3.6 MG/DL (ref 2.7–4.5)
PLATELET # BLD AUTO: 126 K/UL (ref 150–450)
PMV BLD AUTO: 11.1 FL (ref 9.2–12.9)
POTASSIUM SERPL-SCNC: 3.7 MMOL/L (ref 3.5–5.1)
POTASSIUM SERPL-SCNC: 4.9 MMOL/L (ref 3.5–5.1)
PROT SERPL-MCNC: 8.2 G/DL (ref 6–8.4)
RBC # BLD AUTO: 3.57 M/UL (ref 4.6–6.2)
SODIUM SERPL-SCNC: 139 MMOL/L (ref 136–145)
SODIUM SERPL-SCNC: 142 MMOL/L (ref 136–145)
WBC # BLD AUTO: 7.77 K/UL (ref 3.9–12.7)

## 2023-12-14 PROCEDURE — 36415 COLL VENOUS BLD VENIPUNCTURE: CPT

## 2023-12-14 PROCEDURE — 99233 SBSQ HOSP IP/OBS HIGH 50: CPT | Mod: GC,,, | Performed by: STUDENT IN AN ORGANIZED HEALTH CARE EDUCATION/TRAINING PROGRAM

## 2023-12-14 PROCEDURE — 25000003 PHARM REV CODE 250: Performed by: PHYSICIAN ASSISTANT

## 2023-12-14 PROCEDURE — 11000001 HC ACUTE MED/SURG PRIVATE ROOM

## 2023-12-14 PROCEDURE — 63600175 PHARM REV CODE 636 W HCPCS: Performed by: REGISTERED NURSE

## 2023-12-14 PROCEDURE — 99233 PR SUBSEQUENT HOSPITAL CARE,LEVL III: ICD-10-PCS | Mod: GC,,, | Performed by: STUDENT IN AN ORGANIZED HEALTH CARE EDUCATION/TRAINING PROGRAM

## 2023-12-14 PROCEDURE — 25000003 PHARM REV CODE 250

## 2023-12-14 PROCEDURE — 85025 COMPLETE CBC W/AUTO DIFF WBC: CPT | Performed by: NURSE PRACTITIONER

## 2023-12-14 PROCEDURE — 80048 BASIC METABOLIC PNL TOTAL CA: CPT | Mod: XB

## 2023-12-14 PROCEDURE — 80053 COMPREHEN METABOLIC PANEL: CPT | Performed by: NURSE PRACTITIONER

## 2023-12-14 PROCEDURE — 25000003 PHARM REV CODE 250: Performed by: REGISTERED NURSE

## 2023-12-14 PROCEDURE — 84100 ASSAY OF PHOSPHORUS: CPT

## 2023-12-14 PROCEDURE — 83735 ASSAY OF MAGNESIUM: CPT

## 2023-12-14 PROCEDURE — 36415 COLL VENOUS BLD VENIPUNCTURE: CPT | Performed by: NURSE PRACTITIONER

## 2023-12-14 RX ORDER — NIFEDIPINE 30 MG/1
90 TABLET, EXTENDED RELEASE ORAL DAILY
Status: DISCONTINUED | OUTPATIENT
Start: 2023-12-15 | End: 2023-12-15 | Stop reason: HOSPADM

## 2023-12-14 RX ORDER — NIFEDIPINE 30 MG/1
30 TABLET, EXTENDED RELEASE ORAL DAILY
Status: DISCONTINUED | OUTPATIENT
Start: 2023-12-14 | End: 2023-12-14

## 2023-12-14 RX ADMIN — HEPARIN SODIUM 5000 UNITS: 5000 INJECTION INTRAVENOUS; SUBCUTANEOUS at 10:12

## 2023-12-14 RX ADMIN — HEPARIN SODIUM 5000 UNITS: 5000 INJECTION INTRAVENOUS; SUBCUTANEOUS at 02:12

## 2023-12-14 RX ADMIN — MEMANTINE HYDROCHLORIDE 5 MG: 5 TABLET ORAL at 08:12

## 2023-12-14 RX ADMIN — LOSARTAN POTASSIUM 100 MG: 50 TABLET, FILM COATED ORAL at 08:12

## 2023-12-14 RX ADMIN — Medication 6 MG: at 10:12

## 2023-12-14 RX ADMIN — QUETIAPINE FUMARATE 12.5 MG: 25 TABLET ORAL at 08:12

## 2023-12-14 RX ADMIN — HEPARIN SODIUM 5000 UNITS: 5000 INJECTION INTRAVENOUS; SUBCUTANEOUS at 06:12

## 2023-12-14 RX ADMIN — MEMANTINE HYDROCHLORIDE 5 MG: 5 TABLET ORAL at 10:12

## 2023-12-14 RX ADMIN — SENNOSIDES AND DOCUSATE SODIUM 1 TABLET: 8.6; 5 TABLET ORAL at 08:12

## 2023-12-14 RX ADMIN — QUETIAPINE FUMARATE 50 MG: 25 TABLET ORAL at 10:12

## 2023-12-14 RX ADMIN — NIFEDIPINE 60 MG: 30 TABLET, FILM COATED, EXTENDED RELEASE ORAL at 08:12

## 2023-12-14 NOTE — SUBJECTIVE & OBJECTIVE
Neurologic Chief Complaint: ICH    Subjective:     Interval History: 12/14: NAEON. AF, 100-197 SBP. Nifedipine increased to 90mg qd. K+ 4.9 today from 3.4 yesterday, visible hemolysis, repeating afternoon BMP. Medically ready for discharge likely tomorrow.     HPI, Past Medical, Family, and Social History remains the same as documented in the initial encounter.     Review of Systems   Unable to perform ROS: Dementia     Scheduled Meds:   heparin (porcine)  5,000 Units Subcutaneous Q8H    losartan  100 mg Oral Daily    melatonin  6 mg Oral Nightly    memantine  5 mg Oral BID    NIFEdipine  30 mg Oral Daily    [START ON 12/15/2023] NIFEdipine  90 mg Oral Daily    QUEtiapine  12.5 mg Oral QAM    QUEtiapine  50 mg Oral QHS    senna-docusate 8.6-50 mg  1 tablet Oral BID     Continuous Infusions:        PRN Meds:acetaminophen, labetalol, ondansetron    Objective:     Vital Signs (Most Recent):  Temp: 97.5 °F (36.4 °C) (12/14/23 1157)  Pulse: 71 (12/14/23 1157)  Resp: 18 (12/14/23 1157)  BP: 104/70 (12/14/23 1157)  SpO2: 96 % (12/14/23 1157)  BP Location: Right arm    Vital Signs Range (Last 24H):  Temp:  [97.2 °F (36.2 °C)-98.6 °F (37 °C)]   Pulse:  [67-82]   Resp:  [18]   BP: (100-197)/()   SpO2:  [96 %-98 %]   BP Location: Right arm       Physical Exam  Vitals reviewed.   Constitutional:       Appearance: He is ill-appearing.   HENT:      Head: Normocephalic and atraumatic.   Cardiovascular:      Rate and Rhythm: Normal rate.   Pulmonary:      Effort: Pulmonary effort is normal. No respiratory distress.   Skin:     General: Skin is warm and dry.   Neurological:      Mental Status: He is alert.              Neurological Exam:   LOC: alert  Attention Span: poor  Language: No aphasia  Articulation: mild dysarthria  Orientation: oriented to self only  Visual Fields: limited by patient being uncooperative with exam; does not appear to blink to threat when tested  EOM (CN III, IV, VI): Full/intact  Pupils (CN II, III):  "PERRL  Facial Movement (CN VII): right sided facial droop  Motor: Arm left  Paresis: 4/5  Leg left  Paresis: 4/5  Arm right  Paresis: 3/5  Leg right Paresis: 3/5  Cerebellum: difficult to test due to patient's lack of cooperativeness with exam  Localizes pain throughout all four extremities    Laboratory:  BMP:   Recent Labs   Lab 12/14/23  0626      K 4.9      CO2 25   BUN 19   CREATININE 0.8   CALCIUM 9.0       CBC:   Recent Labs   Lab 12/14/23  0626   WBC 7.77   RBC 3.57*   HGB 11.0*   HCT 33.8*   *   MCV 95   MCH 30.8   MCHC 32.5       Lipid Panel:   No results for input(s): "CHOL", "LDLCALC", "HDL", "TRIG" in the last 168 hours.    Hgb A1C:   No results for input(s): "HGBA1C" in the last 168 hours.    TSH:   No results for input(s): "TSH" in the last 168 hours.      Diagnostic Results     Brain Imaging   MRI Brain: 12/9/23  Allowing for a difference in technique, similar appearance of left cerebellar hemorrhage with similar mild mass effect on the 4th ventricle.  No hydrocephalus.     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     Multiple chronic microhemorrhages as detailed above that may reflect combination of hypertensive hemorrhages and amyloid angiopathy.    CT Head 12/3/23  1. Left cerebellar intraparenchymal hemorrhage with intraventricular extension into the 4th ventricle.  2. No acute fracture or traumatic dislocation of the cervical spine.  3. Paranasal sinus disease.  4. Degenerative changes of the cervical spine most pronounced at C5-C6 with moderate spinal canal stenosis and severe bilateral neural foraminal narrowing.    CT Head 12/4/23: 4:01  Redemonstration of left cerebellar hemorrhage, unchanged in size and configuration from prior exam with continued interventricular extension.  No significant interval detrimental change compared to prior head CT.     CT Head 12/4/23: 13:47  Similar left cerebellar " intraparenchymal hemorrhage with extension into the 4th ventricle.     Vessel Imaging   CTA Head 12/5/23  Stable left cerebellar hemorrhage, with mild extension into the 4th ventricle. Scattered mild intracranial atherosclerosis with no evidence of aneurysm, significant stenosis, or occlusion. Atherosclerosis about the visualized extracranial vasculature, noting potential focal ulceration about the distal left cervical internal carotid artery.    Cardiac Imaging   Echo 12/5/23    Left Ventricle: The left ventricle is normal in size. Normal wall thickness. There is concentric remodeling. Normal wall motion. There is normal systolic function with a visually estimated ejection fraction of 60 - 65%. Diastolic function cannot be reliably determined in the presence of mitral valve disease.    Right Ventricle: Normal right ventricular cavity size. Wall thickness is normal. Right ventricle wall motion  is normal. Systolic function is normal.    Aortic Valve: The aortic valve is a trileaflet valve. There is mild aortic valve sclerosis. There is annular calcification present. There is mild aortic regurgitation.    Mitral Valve: There is moderate mitral annular calcification present. There is normal leaflet mobility. There is systolic anterior motion of the mitral valve. There is moderate stenosis. The mean pressure gradient across the mitral valve is 8 mmHg at a heart rate of 96 bpm. There is no significant regurgitation.    Tricuspid Valve: There is mild regurgitation.    Pulmonary Artery: The estimated pulmonary artery systolic pressure is 36 mmHg.    IVC/SVC: Normal venous pressure at 3 mmHg.    Pericardium: Left pleural effusion.

## 2023-12-14 NOTE — PLAN OF CARE
12/14/23 1446   Post-Acute Status   Post-Acute Authorization Placement   Post-Acute Placement Status Pending medical clearance/testing   Discharge Delays (!) Change in Medical Condition   Discharge Plan   Discharge Plan A Skilled Nursing Facility     Patient accepted at [facility] pending medical clearance and family completing ppw.      SW met with patient's daughter Alisson at bedside.  She and her sister agreeable to [facility], but are concerned about the access to finances. Alisson had been to the [employer] and while there brother has had access the financials revoked, no family has FPOA so they cannot access the bank statements.  Alisson does have the patient's account info and award letter.  GEOVANNA will let Rachel at [facility] know to see if they can work with that.      Discharge Plan A and Plan B have been determined by review of patient's clinical status, future medical and therapeutic needs, and coverage/benefits for post-acute care in coordination with multidisciplinary team members.    Nallely Qureshi, ALISA  [facility]  [phone]

## 2023-12-14 NOTE — PLAN OF CARE
CHW met with patient/family at bedside. Patient experience rounding completed and reviewed the following.     Do you know your discharge plan? Yes or No,    If yes, what is the plan?   Yes SNF    Have you discussed your needs and preferences with your SW/CM?  Yes      If you are discharging home, do you have help at home?  Yes    Do you think you will need help additional at home at discharge?  No     Do you currently have difficulty keeping up with bills, affording medicine or buying food?  No    Assigned SW/CM notified of any patient/family needs or concerns. Appropriate resources provided to address patient's needs.  Nallely Adan W  Case Management  [phone]
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
## 2023-12-14 NOTE — PLAN OF CARE
[facility] Care Plan    POC reviewed with Sheng Virgen and family at 0300. Pt verbalized understanding. Questions and concerns addressed. No acute events overnight. Pt progressing toward goals. Will continue to monitor. See below and flowsheets for full assessment and VS info.     -pt frequently removing devices O/N including well-secured upper extremity PIV's, condom cath, SCD tubing, gown, and moving lower extremities out of bed.  Pt NOT acutely agitated but pleasantly confused.  -Informed policy regarding PIV's is it is required to obtain order/nursing communication to start lower extremity IV's. Informed stroke team, per  note, the reason patient was NOT admitted/discharged to outside facility is due to patient being in restraints(mittens).  Instructed per stroke team to attempt insertion of PIV into upper extremities later in shift and to restart restraints if required.  Pt currently only requires PRN administration of labetalol for SBP>180 and not any currently regularly scheduled medications.  -PRN labetalol given x1      Is this a stroke patient? yes- Stroke booklet reviewed with patient, risk factors identified for patient and stroke booklet remains at bedside for ongoing education.     Neuro:  Hiral Coma Scale  Best Eye Response: 4-->(E4) spontaneous  Best Motor Response: 6-->(M6) obeys commands (with repitition)  Best Verbal Response: 4-->(V4) confused  Hiral Coma Scale Score: 14  Assessment Qualifiers: patient not sedated/intubated, no eye obstruction present  Pupil PERRLA: no     24hr Temp:  [97.2 °F (36.2 °C)-98.6 °F (37 °C)]     CV:   Rhythm: normal sinus rhythm  BP goals:   SBP < 180  MAP > 65    Resp:           Plan: N/A    GI/:     Diet/Nutrition Received: mechanical/dental soft  Last Bowel Movement: 12/13/23  Voiding Characteristics: external catheter    Intake/Output Summary (Last 24 hours) at 12/14/2023 0031  Last data filed at 12/13/2023 2327  Gross per 24 hour   Intake 1030 ml  "  Output 903 ml   Net 127 ml     Unmeasured Output  Urine Occurrence: 1  Stool Occurrence: 2  Emesis Occurrence: 0  Pad Count: 2    Labs/Accuchecks:  Recent Labs   Lab 12/13/23  0836   WBC 4.90   RBC 3.25*   HGB 10.1*   HCT 30.1*         Recent Labs   Lab 12/13/23  0836      K 3.4*   CO2 26      BUN 18   CREATININE 0.8   ALKPHOS 65   ALT 21   AST 38   BILITOT 0.4    No results for input(s): "PROTIME", "INR", "APTT", "HEPANTIXA" in the last 168 hours. No results for input(s): "CPK", "CPKMB", "TROPONINI", "MB" in the last 168 hours.    Electrolytes: Electrolytes replaced  Accuchecks: none    Gtts:      LDA/Wounds:  Lines/Drains/Airways       Drain  Duration             Male External Urinary Catheter 12/06/23 0105 Small 7 days              Peripheral Intravenous Line  Duration                  Peripheral IV - Single Lumen 12/13/23 2230 22 G Anterior;Right Ankle <1 day                  Wounds: Yes  Wound care consulted: Yes   "

## 2023-12-14 NOTE — PLAN OF CARE
Problem: Restraint, Nonbehavioral (Nonviolent)  Goal: Absence of Harm or Injury  Outcome: Met     Problem: Infection  Goal: Absence of Infection Signs and Symptoms  Outcome: Met       Pt. Alert to self, BP in am very elevated, labetalol given.  BP WNL since late morning.  Pt. Very confused, AO to self, responds to commands at times.  Seems confused by instructions.  Incontinent x2.

## 2023-12-14 NOTE — ASSESSMENT & PLAN NOTE
Mild. Likely due to poor oral intake. Replace as needed.  K+ 4.9 today 12/14, visible hemoylsis, repeating afternoon BMP

## 2023-12-14 NOTE — PLAN OF CARE
"SW received message from [facility] stating that the family had told them that patient was in restraints and that they cannot accept.  SW notified them that patient no longer in mitts and that those were discontinued yesterday.  Despite that, SW was notified that they are not able to "meet the patient's needs" and have declined admission.      GEOVANNA reached out to Rachel at [facility], who had previously accepted.  She said she may have a bed tomorrow and asked SW to f/u.  GEOVANNA will reach out in the morning and then call family to discuss.      Discharge Plan A and Plan B have been determined by review of patient's clinical status, future medical and therapeutic needs, and coverage/benefits for post-acute care in coordination with multidisciplinary team members.    Nallely Qureshi, ALISA  [facility]  [phone]    "

## 2023-12-15 VITALS
DIASTOLIC BLOOD PRESSURE: 80 MMHG | TEMPERATURE: 98 F | OXYGEN SATURATION: 100 % | WEIGHT: 142 LBS | SYSTOLIC BLOOD PRESSURE: 126 MMHG | HEIGHT: 72 IN | HEART RATE: 79 BPM | RESPIRATION RATE: 16 BRPM | BODY MASS INDEX: 19.23 KG/M2

## 2023-12-15 PROBLEM — E87.6 HYPOKALEMIA: Status: RESOLVED | Noted: 2023-12-06 | Resolved: 2023-12-15

## 2023-12-15 LAB
ALBUMIN SERPL BCP-MCNC: 2.6 G/DL (ref 3.5–5.2)
ALP SERPL-CCNC: 73 U/L (ref 55–135)
ALT SERPL W/O P-5'-P-CCNC: 19 U/L (ref 10–44)
ANION GAP SERPL CALC-SCNC: 4 MMOL/L (ref 8–16)
AST SERPL-CCNC: 33 U/L (ref 10–40)
BASOPHILS # BLD AUTO: 0.02 K/UL (ref 0–0.2)
BASOPHILS NFR BLD: 0.4 % (ref 0–1.9)
BILIRUB SERPL-MCNC: 0.3 MG/DL (ref 0.1–1)
BUN SERPL-MCNC: 21 MG/DL (ref 8–23)
CALCIUM SERPL-MCNC: 8.4 MG/DL (ref 8.7–10.5)
CHLORIDE SERPL-SCNC: 108 MMOL/L (ref 95–110)
CO2 SERPL-SCNC: 28 MMOL/L (ref 23–29)
CREAT SERPL-MCNC: 0.8 MG/DL (ref 0.5–1.4)
DIFFERENTIAL METHOD: ABNORMAL
EOSINOPHIL # BLD AUTO: 0.1 K/UL (ref 0–0.5)
EOSINOPHIL NFR BLD: 1.3 % (ref 0–8)
ERYTHROCYTE [DISTWIDTH] IN BLOOD BY AUTOMATED COUNT: 16.9 % (ref 11.5–14.5)
EST. GFR  (NO RACE VARIABLE): >60 ML/MIN/1.73 M^2
GLUCOSE SERPL-MCNC: 70 MG/DL (ref 70–110)
HCT VFR BLD AUTO: 26.5 % (ref 40–54)
HGB BLD-MCNC: 9.1 G/DL (ref 14–18)
IMM GRANULOCYTES # BLD AUTO: 0.01 K/UL (ref 0–0.04)
IMM GRANULOCYTES NFR BLD AUTO: 0.2 % (ref 0–0.5)
LYMPHOCYTES # BLD AUTO: 1.6 K/UL (ref 1–4.8)
LYMPHOCYTES NFR BLD: 33 % (ref 18–48)
MAGNESIUM SERPL-MCNC: 2.2 MG/DL (ref 1.6–2.6)
MCH RBC QN AUTO: 30.6 PG (ref 27–31)
MCHC RBC AUTO-ENTMCNC: 34.3 G/DL (ref 32–36)
MCV RBC AUTO: 89 FL (ref 82–98)
MONOCYTES # BLD AUTO: 0.5 K/UL (ref 0.3–1)
MONOCYTES NFR BLD: 9.8 % (ref 4–15)
NEUTROPHILS # BLD AUTO: 2.6 K/UL (ref 1.8–7.7)
NEUTROPHILS NFR BLD: 55.3 % (ref 38–73)
NRBC BLD-RTO: 0 /100 WBC
PHOSPHATE SERPL-MCNC: 2.9 MG/DL (ref 2.7–4.5)
PLATELET # BLD AUTO: 198 K/UL (ref 150–450)
PMV BLD AUTO: 10 FL (ref 9.2–12.9)
POTASSIUM SERPL-SCNC: 3.9 MMOL/L (ref 3.5–5.1)
PROT SERPL-MCNC: 6.6 G/DL (ref 6–8.4)
RBC # BLD AUTO: 2.97 M/UL (ref 4.6–6.2)
SODIUM SERPL-SCNC: 140 MMOL/L (ref 136–145)
WBC # BLD AUTO: 4.69 K/UL (ref 3.9–12.7)

## 2023-12-15 PROCEDURE — 97112 NEUROMUSCULAR REEDUCATION: CPT

## 2023-12-15 PROCEDURE — 99233 SBSQ HOSP IP/OBS HIGH 50: CPT | Mod: ,,, | Performed by: PSYCHIATRY & NEUROLOGY

## 2023-12-15 PROCEDURE — 99233 PR SUBSEQUENT HOSPITAL CARE,LEVL III: ICD-10-PCS | Mod: ,,, | Performed by: PSYCHIATRY & NEUROLOGY

## 2023-12-15 PROCEDURE — 25000003 PHARM REV CODE 250

## 2023-12-15 PROCEDURE — 63600175 PHARM REV CODE 636 W HCPCS: Performed by: REGISTERED NURSE

## 2023-12-15 PROCEDURE — 83735 ASSAY OF MAGNESIUM: CPT

## 2023-12-15 PROCEDURE — 80053 COMPREHEN METABOLIC PANEL: CPT | Performed by: NURSE PRACTITIONER

## 2023-12-15 PROCEDURE — 25000003 PHARM REV CODE 250: Performed by: PHYSICIAN ASSISTANT

## 2023-12-15 PROCEDURE — 36415 COLL VENOUS BLD VENIPUNCTURE: CPT | Performed by: NURSE PRACTITIONER

## 2023-12-15 PROCEDURE — 97530 THERAPEUTIC ACTIVITIES: CPT

## 2023-12-15 PROCEDURE — 85025 COMPLETE CBC W/AUTO DIFF WBC: CPT | Performed by: NURSE PRACTITIONER

## 2023-12-15 PROCEDURE — 25000003 PHARM REV CODE 250: Performed by: REGISTERED NURSE

## 2023-12-15 PROCEDURE — 84100 ASSAY OF PHOSPHORUS: CPT

## 2023-12-15 RX ORDER — NIFEDIPINE 90 MG/1
90 TABLET, EXTENDED RELEASE ORAL DAILY
Qty: 30 TABLET | Refills: 11
Start: 2023-12-16 | End: 2024-12-15

## 2023-12-15 RX ORDER — QUETIAPINE FUMARATE 25 MG/1
TABLET, FILM COATED ORAL
Qty: 30 TABLET | Refills: 11
Start: 2023-12-16

## 2023-12-15 RX ORDER — LOSARTAN POTASSIUM 100 MG/1
100 TABLET ORAL DAILY
Qty: 90 TABLET | Refills: 3
Start: 2023-12-16 | End: 2024-12-15

## 2023-12-15 RX ORDER — QUETIAPINE FUMARATE 25 MG/1
25 TABLET, FILM COATED ORAL EVERY MORNING
Qty: 30 TABLET | Refills: 11
Start: 2023-12-16 | End: 2023-12-15

## 2023-12-15 RX ADMIN — HEPARIN SODIUM 5000 UNITS: 5000 INJECTION INTRAVENOUS; SUBCUTANEOUS at 05:12

## 2023-12-15 RX ADMIN — MEMANTINE HYDROCHLORIDE 5 MG: 5 TABLET ORAL at 09:12

## 2023-12-15 RX ADMIN — QUETIAPINE FUMARATE 12.5 MG: 25 TABLET ORAL at 06:12

## 2023-12-15 RX ADMIN — NIFEDIPINE 90 MG: 30 TABLET, FILM COATED, EXTENDED RELEASE ORAL at 09:12

## 2023-12-15 RX ADMIN — HEPARIN SODIUM 5000 UNITS: 5000 INJECTION INTRAVENOUS; SUBCUTANEOUS at 02:12

## 2023-12-15 RX ADMIN — LOSARTAN POTASSIUM 100 MG: 50 TABLET, FILM COATED ORAL at 09:12

## 2023-12-15 RX ADMIN — SENNOSIDES AND DOCUSATE SODIUM 1 TABLET: 8.6; 5 TABLET ORAL at 09:12
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
## 2023-12-15 NOTE — NURSING
Report called to Delfino at [facility] for patient report.     1406: Spoke to Gerard Virgen and informed her of patient being p/u for transport to [facility] at [id number]

## 2023-12-15 NOTE — SUBJECTIVE & OBJECTIVE
Neurologic Chief Complaint: ICH    Subjective:     Interval History: NAEO, neuro exam grossly stable. BPs improved with med changes yeterday. K stable. Stable for discharge to SNF today.    HPI, Past Medical, Family, and Social History remains the same as documented in the initial encounter.     Review of Systems   Unable to perform ROS: Dementia     Scheduled Meds:   heparin (porcine)  5,000 Units Subcutaneous Q8H    losartan  100 mg Oral Daily    melatonin  6 mg Oral Nightly    memantine  5 mg Oral BID    NIFEdipine  90 mg Oral Daily    QUEtiapine  12.5 mg Oral QAM    QUEtiapine  50 mg Oral QHS    senna-docusate 8.6-50 mg  1 tablet Oral BID     Continuous Infusions:        PRN Meds:acetaminophen, labetalol, ondansetron    Objective:     Vital Signs (Most Recent):  Temp: 98.2 °F (36.8 °C) (12/15/23 0759)  Pulse: 69 (12/15/23 0759)  Resp: 14 (12/15/23 0759)  BP: (!) 138/90 (12/15/23 0954)  SpO2: 100 % (12/15/23 0759)  BP Location: Left arm    Vital Signs Range (Last 24H):  Temp:  [97.5 °F (36.4 °C)-98.2 °F (36.8 °C)]   Pulse:  [69-83]   Resp:  [14-18]   BP: ()/()   SpO2:  [96 %-100 %]   BP Location: Left arm       Physical Exam  Vitals reviewed.   Constitutional:       Appearance: He is ill-appearing.   HENT:      Head: Normocephalic.   Cardiovascular:      Rate and Rhythm: Normal rate.   Pulmonary:      Effort: Pulmonary effort is normal. No respiratory distress.   Skin:     General: Skin is warm and dry.   Neurological:      Mental Status: He is alert.              Neurological Exam:   LOC: alert  Attention Span: poor  Language: mild aphasia  Articulation: mild dysarthria  Orientation: oriented to self only  Visual Fields: limited by patient being uncooperative with exam  EOM (CN III, IV, VI): Full/intact  Facial Movement (CN VII): right sided facial droop  Motor: Arm left  Paresis: 4/5  Leg left  Paresis: 4/5  Arm right  Paresis: 3/5  Leg right Paresis: 3/5  Cerebellum: difficult to test due to  "patient's lack of cooperativeness with exam  Localizes pain throughout all four extremities    Laboratory:  BMP:   Recent Labs   Lab 12/15/23  0535      K 3.9      CO2 28   BUN 21   CREATININE 0.8   CALCIUM 8.4*       CBC:   Recent Labs   Lab 12/15/23  0535   WBC 4.69   RBC 2.97*   HGB 9.1*   HCT 26.5*      MCV 89   MCH 30.6   MCHC 34.3       Lipid Panel:   No results for input(s): "CHOL", "LDLCALC", "HDL", "TRIG" in the last 168 hours.    Hgb A1C:   No results for input(s): "HGBA1C" in the last 168 hours.    TSH:   No results for input(s): "TSH" in the last 168 hours.      Diagnostic Results     Brain Imaging   MRI Brain: 12/9/23  Allowing for a difference in technique, similar appearance of left cerebellar hemorrhage with similar mild mass effect on the 4th ventricle.  No hydrocephalus.  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  Multiple chronic microhemorrhages as detailed above that may reflect combination of hypertensive hemorrhages and amyloid angiopathy.    CT Head 12/3/23  1. Left cerebellar intraparenchymal hemorrhage with intraventricular extension into the 4th ventricle.  2. No acute fracture or traumatic dislocation of the cervical spine.  3. Paranasal sinus disease.  4. Degenerative changes of the cervical spine most pronounced at C5-C6 with moderate spinal canal stenosis and severe bilateral neural foraminal narrowing.    CT Head 12/4/23: 4:01  Redemonstration of left cerebellar hemorrhage, unchanged in size and configuration from prior exam with continued interventricular extension.  No significant interval detrimental change compared to prior head CT.     CT Head 12/4/23: 13:47  Similar left cerebellar intraparenchymal hemorrhage with extension into the 4th ventricle.       Vessel Imaging   CTA Head 12/5/23  Stable left cerebellar hemorrhage, with mild extension into the 4th ventricle. Scattered mild " intracranial atherosclerosis with no evidence of aneurysm, significant stenosis, or occlusion. Atherosclerosis about the visualized extracranial vasculature, noting potential focal ulceration about the distal left cervical internal carotid artery.      Cardiac Imaging   Echo 12/5/23    Left Ventricle: The left ventricle is normal in size. Normal wall thickness. There is concentric remodeling. Normal wall motion. There is normal systolic function with a visually estimated ejection fraction of 60 - 65%. Diastolic function cannot be reliably determined in the presence of mitral valve disease.    Right Ventricle: Normal right ventricular cavity size. Wall thickness is normal. Right ventricle wall motion  is normal. Systolic function is normal.    Aortic Valve: The aortic valve is a trileaflet valve. There is mild aortic valve sclerosis. There is annular calcification present. There is mild aortic regurgitation.    Mitral Valve: There is moderate mitral annular calcification present. There is normal leaflet mobility. There is systolic anterior motion of the mitral valve. There is moderate stenosis. The mean pressure gradient across the mitral valve is 8 mmHg at a heart rate of 96 bpm. There is no significant regurgitation.    Tricuspid Valve: There is mild regurgitation.    Pulmonary Artery: The estimated pulmonary artery systolic pressure is 36 mmHg.    IVC/SVC: Normal venous pressure at 3 mmHg.    Pericardium: Left pleural effusion.

## 2023-12-15 NOTE — DISCHARGE SUMMARY
"Jamison Morejon - Neurosurgery ([facility])  Vascular Neurology  Comprehensive Stroke Center  Discharge Summary     Summary:     Admit Date: 12/3/2023  8:30 PM    Discharge Date and Time: 12/15/2023  3:24 PM    Attending Physician: Candelaria Lewis MD     Discharge Provider: Nakia Schuster PA-C    History of Present Illness: Sheng Virgen is a 83 y.o. male with a significant medical history of dementia, HTN, prostate CA who presents to the hospital for evaluation of AMS.  HPI information gathered from review of the patient's medical record due to the patient currently being unresponsive, history of dementia.  Per the ED provider note:  "   Fatigue       Generalized weakness getting progressively worse, strong urine odor, incontinent of urine      Mr. Virgen is an 83-year-old male with history of dementia.  He has become quite confused and out of it over the last 2 days.  He fell out of his chair yesterday.  He has been confused.  There has been no fever or vomiting.  Family has never seen him like this."    The patient had been confused and agitated in the ED.  He was given a total of 5 mg of Haldol and 1 mg of Ativan.  He was also extremely hypertensive, BPs 240s/160s initially.    Hospital Course (synopsis of major diagnoses, care, treatment, and services provided during the course of the hospital stay):   12/5: exam much improved today; patient able to move all four extremities, and occasionally communicate clearly, though is still confused; not very cooperative with exam; off Cardene as of 5:30AM, received prn labetalol around 2AM this morning; on oral hydralazine; CTA done this morning and is without evidence of underlying vascular lesion; echo with normal size of left and right atrium, EF 60-65%; follow-up MRI is ordered. Blood cultures 1/4 growing staph in clusters, MRSA PCR negative.   12/6: neuro exam is stable; remains off cardene, no prns needed; just on po hydral; pending MRI brain w/SWI; Scr trending up from 0.9 to " 1.2; mildly hypokalemic at 3.4; hemoglobin trending down from 11/4 to 10.2, but no signs of bleeding per nusring and primary team; blood cultures with 1/4 bottles positive, but growing likely contaminant (microccocus spp); stable to step down to NPU. Primary team had extensive discussion with family, and is unclear if son has POA documentation.  12/7: stepped down from NPU; creatinine down-trending; mild hypokalemia, replaced; BP elevated, started losartan, but will run a liter of fluids slowly given slight increase in creatinine in setting of poor po intake; mentation waxes and wanes, likely reflecting delirium; starting seroquel; pending mri brain w/swi; blood cultures growing rhizobacter as well, unsure if this is contaminant, will discuss w/ID service.  12/8: neuro exam stable; up all night; increased seroquel to 50; BP elevated, increased losartan, added nifedipine, and dc'ed hydralazine; mri brain pending, radiology concerned about BBs in patient's leg seen on x-ray; waiting on micro lab regrowing blood cultures for Rhizobium, which has been verified, will re-culture.  12/9: neuro exam stable; repeat blood cultures w/o growth thus far.  12/10: neuro exam stable; repeat blood cultures w/o growth, and still no systemic signs of infection; discussed again w/ID and will start a 2 week course of Cipro  12/11 cipro discontinued per ID as culture results from 12/3 suspected to be likely contaminant, pending SNF placement  12/12: Abx discontinued as blood culture likely contaminant. Still pending SNF placement. NAEON. AF, 108-173 SBP. Isolated hypertension episodes likely 2/2 pain / agitation. Will trial out of mittens today.  12/13: patient remained out of mittens overnight, telesitter discontinued, starting small dose of daytime seroquel, pending placement at [facility], family completing paperwork  12/14: NAEON. AF, 100-197 SBP. Nifedipine increased to 90mg qd. K+ 4.9 today from 3.4 yesterday, visible hemolysis,  repeating afternoon BMP. Medically ready for discharge likely tomorrow.   12/15/2023 NAEO, neuro exam grossly stable. BPs improved with med changes yeterday. K stable. Stable for discharge to SNF today.      Goals of Care Treatment Preferences:  Code Status: Full Code      Stroke Etiology: Hemorrhage ICH Deep Hypertension    STROKE DOCUMENTATION         NIH Scale:  1a. Level of Consciousness: 0-->Alert, keenly responsive  1b. LOC Questions: 2-->Answers neither question correctly  1c. LOC Commands: 1-->Performs one task correctly  2. Best Gaze: 1-->Partial gaze palsy, gaze is abnormal in one or both eyes, but forced deviation or total gaze paresis is not present  3. Visual: 0-->No visual loss  4. Facial Palsy: 0-->Normal symmetrical movements  5a. Motor Arm, Left: 1-->Drift, limb holds 90 (or 45) degrees, but drifts down before full 10 seconds, does not hit bed or other support  5b. Motor Arm, Right: 1-->Drift, limb holds 90 (or 45) degrees, but drifts down before full 10 secs, does not hit bed or other support  6a. Motor Leg, Left: 1-->Drift, leg falls by the end of the 5-sec period but does not hit bed  6b. Motor Leg, Right: 1-->Drift, leg falls by the end of the 5-sec period but does not hit bed  7. Limb Ataxia: 0-->Absent  8. Sensory: 0-->Normal, no sensory loss  9. Best Language: 1-->Mild-to-moderate aphasia, some obvious loss of fluency or facility of comprehension, without significant limitation on ideas expressed or form of expression. Reduction of speech and/or comprehension, however, makes conversation. . . (see row details)  10. Dysarthria: 1-->Mild-to-moderate dysarthria, patient slurs at least some words and, at worst, can be understood with some difficulty  11. Extinction and Inattention (formerly Neglect): 0-->No abnormality  Total (NIH Stroke Scale): 10        Modified Nahomi Score: 5  Hiral Coma Scale:    ABCD2 Score:    [id number] Score:   HAS -BLED Score:   ICH Score:4  Hunt & Prasad  Classification:       Assessment/Plan:     Diagnostic Results:       Brain Imaging   MRI Brain: 12/9/23  Allowing for a difference in technique, similar appearance of left cerebellar hemorrhage with similar mild mass effect on the 4th ventricle.  No hydrocephalus.  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  Multiple chronic microhemorrhages as detailed above that may reflect combination of hypertensive hemorrhages and amyloid angiopathy.     CT Head 12/3/23  1. Left cerebellar intraparenchymal hemorrhage with intraventricular extension into the 4th ventricle.  2. No acute fracture or traumatic dislocation of the cervical spine.  3. Paranasal sinus disease.  4. Degenerative changes of the cervical spine most pronounced at C5-C6 with moderate spinal canal stenosis and severe bilateral neural foraminal narrowing.     CT Head 12/4/23: 4:01  Redemonstration of left cerebellar hemorrhage, unchanged in size and configuration from prior exam with continued interventricular extension.  No significant interval detrimental change compared to prior head CT.      CT Head 12/4/23: 13:47  Similar left cerebellar intraparenchymal hemorrhage with extension into the 4th ventricle.         Vessel Imaging   CTA Head 12/5/23  Stable left cerebellar hemorrhage, with mild extension into the 4th ventricle. Scattered mild intracranial atherosclerosis with no evidence of aneurysm, significant stenosis, or occlusion. Atherosclerosis about the visualized extracranial vasculature, noting potential focal ulceration about the distal left cervical internal carotid artery.        Cardiac Imaging   Echo 12/5/23    Left Ventricle: The left ventricle is normal in size. Normal wall thickness. There is concentric remodeling. Normal wall motion. There is normal systolic function with a visually estimated ejection fraction of 60 - 65%. Diastolic function cannot be reliably  determined in the presence of mitral valve disease.    Right Ventricle: Normal right ventricular cavity size. Wall thickness is normal. Right ventricle wall motion  is normal. Systolic function is normal.    Aortic Valve: The aortic valve is a trileaflet valve. There is mild aortic valve sclerosis. There is annular calcification present. There is mild aortic regurgitation.    Mitral Valve: There is moderate mitral annular calcification present. There is normal leaflet mobility. There is systolic anterior motion of the mitral valve. There is moderate stenosis. The mean pressure gradient across the mitral valve is 8 mmHg at a heart rate of 96 bpm. There is no significant regurgitation.    Tricuspid Valve: There is mild regurgitation.    Pulmonary Artery: The estimated pulmonary artery systolic pressure is 36 mmHg.    IVC/SVC: Normal venous pressure at 3 mmHg.    Pericardium: Left pleural effusion.      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 [I61.4] 12/04/2023 Yes    Hemiparesis [G81.90] 12/13/2023 Yes    Dysarthria [R47.1] 12/13/2023 Yes    Vasogenic cerebral edema [G93.6] 12/13/2023 Yes    Hypophosphatemia [E83.39] 12/12/2023 Unknown    Bacteremia [R78.81] 12/09/2023 No    Delirium [R41.0] 12/08/2023 Yes    IVH (intraventricular hemorrhage) [I61.5] 12/04/2023 Yes    Brain compression [G93.5] 12/04/2023 Yes    Dementia [F03.90] 07/06/2023 Yes    Hypertension [I10] 11/28/2012 Yes      Problems Resolved During this Admission:    Diagnosis Date Noted Date Resolved POA    Hypokalemia [E87.6] 12/06/2023 12/15/2023 Yes     No new Assessment & Plan notes have been filed under this hospital service since the last note was generated.  Service: Vascular Neurology      Recommendations:     Post-discharge complication risks: Falls, Skin breakdown,  Urinary tract infections    Stroke Education given to: caregiver    Follow-up in Stroke Clinic in 4-6 weeks.     Discharge Plan:  Antithrombotics: None. Contraindicated due to  ICH  Statin: None. Contraindicated due to ICH  Aggresive risk factor modification:  Hypertension  Prostate cancer    Follow Up:   Follow-up Information       Rupal Garcia MD Follow up.    Specialty: Family Medicine  Why: Please follow up with your primary care provider after SNF  Contact information:  [address] [address]  [facility] [address]  [phone]               [facility] VASCULAR NEUROLOGY Follow up in 4 week(s).    Specialty: Vascular Neurology  Why: Someone will contact you to schedule your stroke follow up appointment  Contact information:  Jayden Morejon  [facility] [id number]  [phone]                           Patient Instructions:      Ambulatory referral/consult to Vascular Neurology   Standing Status: Future   Referral Priority: Routine Referral Type: Consultation   Referral Reason: Specialty Services Required   Requested Specialty: Vascular Neurology   Number of Visits Requested: 1     Diet Cardiac   Order Comments: See Stroke Patient Education Guide Booklet for details.     Call 911 for any of the following:   Order Comments: Call 911  right away if any of the following warning signs come on suddenly, even if the symptoms only last for a few minutes. With stroke, timing is very important.   - Warning Signs of Stroke:  - Weakness: You may feel a sudden weakness, tingling or loss of feeling on one side of your face or body.  - Vision Problems: You may have sudden double vision or trouble seeing in one or both eyes.  - Speech Problems: You may have sudden trouble talking, slured speech, or problems understanding others.  - Headache: You may have sudden, severe headache.  - Movement Problems: You may experience dizziness, a feeling of spinning, a loss of balance, a feeling of falling or blackouts.      Activity as tolerated     Call 911 for any of the following:   Order Comments: Call 911  right away if any of the following warning signs come on suddenly, even if the symptoms only last for a few minutes. With stroke, timing is very important.   - Warning Signs of Stroke:  - Weakness: You may feel a sudden weakness, tingling or loss of feeling on one side of your face or body.  - Vision Problems: You may have sudden double vision or trouble seeing in one or both eyes.  - Speech Problems: You may have sudden trouble talking, slured speech, or problems understanding others.  - Headache: You may have sudden, severe headache.  - Movement Problems: You may experience dizziness, a feeling of spinning, a loss of balance, a feeling of falling or blackouts.     Activity as tolerated       Medications:  Reconciled Home Medications:      Medication List        START taking these medications      acetaminophen 325 MG tablet  Commonly known as: TYLENOL  Take 2 tablets (650 mg total) by mouth every 6 (six) hours as needed for Pain.     * losartan 50 MG tablet  Commonly known as: COZAAR  Take 1 tablet (50 mg total) by mouth once daily.     * losartan 100 MG tablet  Commonly known as: COZAAR  Take 1 tablet (100 mg total) by mouth once daily.  Start taking on: December 16, 2023     melatonin 3 mg tablet  Commonly known as: MELATIN  Take 2 tablets (6 mg total) by mouth nightly.     * NIFEdipine 60 MG (OSM) 24 hr tablet  Commonly known as: PROCARDIA-XL  Take 1 tablet (60 mg total) by mouth once daily.     * NIFEdipine 90 MG (OSM) 24 hr tablet  Commonly known as: PROCARDIA-XL  Take 1 tablet (90 mg total) by mouth once daily.  Start taking on: December 16, 2023     * QUEtiapine 50 MG tablet  Commonly known as: SEROQUEL  Take 1 tablet (50 mg total) by mouth every evening.     * QUEtiapine 25 MG Tab  Commonly known as: SEROQUEL  Take 1 tablet (25 mg total) by mouth every morning. Give 12.5mg (1/2 tablet) every morning  Start taking  on: December 16, 2023     senna-docusate 8.6-50 mg 8.6-50 mg per tablet  Commonly known as: PERICOLACE  Take 1 tablet by mouth 2 (two) times daily as needed for Constipation.           * This list has 6 medication(s) that are the same as other medications prescribed for you. Read the directions carefully, and ask your doctor or other care provider to review them with you.                CONTINUE taking these medications      memantine 10 MG Tab  Commonly known as: NAMENDA  Take 5 mg by mouth 2 (two) times daily.            STOP taking these medications      donepeziL 5 MG tablet  Commonly known as: ARICEPT     sildenafiL 100 MG tablet  Commonly known as: VIAGRA     tadalafiL 20 MG Tab  Commonly known as: CIALIS     vardenafiL 20 MG tablet  Commonly known as: LEVITRA              Nakia Schuster PA-C  Comprehensive Stroke Center  Department of Vascular Neurology   [facility] Neurosurgery ([facility])

## 2023-12-15 NOTE — PT/OT/SLP PROGRESS
Physical Therapy Treatment    Patient Name: Sheng Virgen   MRN: [id number]    Therapy tech utilized for session to maximize physical performance and safety  Recommendations:     Discharge Recommendations: Moderate Intensity Therapy  Discharge Equipment Recommendations: lift device, hospital bed, wheelchair  Barriers to discharge: Increased level of assist and Decreased caregiver support    Assessment:     Sheng Virgen is a 83 y.o. 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 He presents with the following impairments/functional limitations: weakness, impaired endurance, impaired self care skills, impaired functional mobility, gait instability, impaired balance, impaired cognition, impaired coordination, decreased upper extremity function, decreased lower extremity function, decreased safety awareness, abnormal tone, decreased ROM, impaired fine motor. Pt with improved tolerance to therapy treatment as compared to previous PT sessions. Pt with improved arousal and participation and demonstrating fair understanding of goals of therapy. Pt continues to require increased assist for functional mobility 2/2 AMS, poor motor initiation, impaired motor planning, poor activity tolerance, and decreased insight to current functional deficits. Pt would continue to benefit from skilled acute PT in order to address current deficits and progress functional mobility.     Rehab Prognosis: Fair; patient continues to benefit from acute skilled PT services to address these deficits and reach maximum level of function.  Recent Surgery: * No surgery found *      Plan:     During this hospitalization, patient to be seen 3 x/week to address the identified rehab impairments via gait training, therapeutic activities, therapeutic exercises, neuromuscular re-education and progress toward the following goals:    Plan of Care Expires:  01/03/24    Subjective     Chief Complaint: None  "verbalized  Patient/Family Comments/Goals: "You either you do or you don't. Some people just have it."  Pain/Comfort:  Pain Rating 1: 0/10    Objective:     Communicated with RN prior to session. Patient found HOB elevated with bed alarm, telemetry, Condom Catheter, SCD, blood pressure cuff, pulse ox (continuous) upon PT entry to room.     General Precautions: Standard, aspiration, fall  Orthopedic Precautions: N/A  Braces: N/A    Functional Mobility:  Bed Mobility:  Verbal cues for sequencing and technique  Rolling Left: moderate assistance  Rolling Right: moderate assistance  Scooting: maximal assistance  Supine to Sit: maximal assistance for LE management and trunk management with HOB elevated  Sit to Supine: total assistance for LE management and trunk management with HOB flat  Transfers:    Sit to Stand: x2 reps from EOB; maximal assistance with rolling walker with cues for hand placement and foot placement  Verbal cues for use of momentum and glute activation  Gait: Patient ambulated 2 steps with rolling walker and moderate assistance of 2 persons.   Patient demonstrates unsteady gait, decreased step length, narrow base of support, decreased weight shift, decreased foot clearance, ambulates outside PATRICIA of RW, flexed posture, and decreased kayden.   Patient required cues for upright posture, orientation to vertical, gluteal activation, sequencing, rolling walker management, safe rolling walker usage, increased step size, and increased foot clearance.  All lines remained intact throughout ambulation trial, gait belt utilized.  Balance:   Static Sitting: Poor, able to maintain for 8 minute(s) with SBA-total  Verbal cues for upright posture, improved maintenance of midline orientation, improved use of BUE for support, and command following   Dynamic Sitting: Poor: Patient unable to accept challenge or move without loss of balance, min-total  Pt completed 1x10 BUE multidirectional reaching in order to challenge " anterior and lateral weight shifts, visual tracking, UE ROM/strength, command following, reaching outside PATRICIA, and core activation  1x15 PROM completed to BLE while seated at EOB  Pt completed 1x3 anterior trunk leans while seated at EOB with B HHA to increase LE weightbearing and joint proprioception  Static Standing: Fair, able to maintain for 2 minute(s) with moderate assistance of 2 persons  Verbal and tactile cues for upright posture and increased hip extension  Dynamic Standing: Fair: Patient accepts minimal challenge, moderate assistance of 2 persons    AM-PAC 6 CLICK MOBILITY  Turning over in bed (including adjusting bedclothes, sheets and blankets)?: 2  Sitting down on and standing up from a chair with arms (e.g., wheelchair, bedside commode, etc.): 2  Moving from lying on back to sitting on the side of the bed?: 2  Moving to and from a bed to a chair (including a wheelchair)?: 1  Need to walk in hospital room?: 1  Climbing 3-5 steps with a railing?: 1  Basic Mobility Total Score: 9     Therapeutic Activities and Exercises:  Patient educated on role of acute care PT and PT POC, safety while in hospital including calling nurse for mobility, and call light usage  Pt educated on importance of maximal participation in therapy session in order to reduce negative effects of prolonged sedentary positioning.   Answered all questions within PT scope of practice and addressed functional mobility concerns.  Condom cath off in bed upon PT entry to room. RN notified and chuckpad/gown/blankets changed.    Patient left HOB elevated with all lines intact, call button in reach, RN notified, and bed alarm on.    GOALS:   Multidisciplinary Problems       Physical Therapy Goals          Problem: Physical Therapy    Goal Priority Disciplines Outcome Goal Variances Interventions   Physical Therapy Goal     PT, PT/OT Ongoing, Progressing     Description: Goals to be met by: 1/4/2024     Patient will increase functional  independence with mobility by performin. Supine to sit with Moderate Assistance  2. Sit to supine with Moderate Assistance  3. Sit to stand transfer with Moderate Assistance  4. Bed to chair transfer with Moderate Assistance using LRAD  5. Gait  x 20 feet with Moderate Assistance using LRAD.   6. Sitting at edge of bed x10 minutes with Contact Guard Assistance  7. Lower extremity exercise program x15 reps per handout, with assistance as needed                         Time Tracking:     PT Received On: 12/15/23  PT Start Time: 1320     PT Stop Time: 1345  PT Total Time (min): 25 min     Billable Minutes: Therapeutic Activity 15 and Neuromuscular Re-education 10      Treatment Type: Treatment  PT/PTA: PT     Number of PTA visits since last PT visit: 0     12/15/2023

## 2023-12-15 NOTE — PLAN OF CARE
Pt had an uneventful day. He had a BM. Daughter was at bedside until the afternoon. No acute signs of distress noted. X3 side rails up, bed in low locked position, call light within reach.     Problem: Fall Injury Risk  Goal: Absence of Fall and Fall-Related Injury  Outcome: Ongoing, Progressing     Problem: Adult Inpatient Plan of Care  Goal: Plan of Care Review  Outcome: Ongoing, Progressing     Problem: Adult Inpatient Plan of Care  Goal: Patient-Specific Goal (Individualized)  Outcome: Ongoing, Progressing     Problem: Adult Inpatient Plan of Care  Goal: Absence of Hospital-Acquired Illness or Injury  Outcome: Ongoing, Progressing     Problem: Adult Inpatient Plan of Care  Goal: Optimal Comfort and Wellbeing  Outcome: Ongoing, Progressing     Problem: Adult Inpatient Plan of Care  Goal: Readiness for Transition of Care  Outcome: Ongoing, Progressing     Problem: Adjustment to Illness (Stroke, Hemorrhagic)  Goal: Optimal Coping  Outcome: Ongoing, Progressing

## 2023-12-15 NOTE — PLAN OF CARE
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       Allergies:  Review of patient's allergies indicates:   Allergen Reactions    Strawberries [strawberry] Hives, Itching and Rash     Strawberries make patient itch and brake  out in hives with a rash and makes lips swell up.        Diet: soft diet and fluid consistency thin    Activities: Activity as tolerated    Goals of Care Treatment Preferences:  Code Status: Full Code      Nursing: Per facility protocol     Labs: per facility protocol      CONSULTS:    Physical Therapy to evaluate and treat 5 times per week,. , Occupational Therapy to evaluate and treat 5 times per week, and Speech Therapy to evaluate and treat 5 times per week,for Language, Swallowing, and Cognition.    MISCELLANEOUS CARE:  Routine Skin for Bedridden Patients: Apply moisture barrier cream to all skin folds and wet areas in perineal area daily and after baths and all bowel movements.    WOUND CARE ORDERS  None    Medications: Review discharge medications with patient and family and provide education.        Current Discharge Medication List        START taking these  medications    Details   acetaminophen (TYLENOL) 325 MG tablet Take 2 tablets (650 mg total) by mouth every 6 (six) hours as needed for Pain.  Refills: 0      losartan (COZAAR) 100 MG tablet Take 1 tablet (100 mg total) by mouth once daily.  Qty: 90 tablet, Refills: 3    Comments: .      melatonin (MELATIN) 3 mg tablet Take 2 tablets (6 mg total) by mouth nightly.  Refills: 0      NIFEdipine (PROCARDIA-XL) 90 MG (OSM) 24 hr tablet Take 1 tablet (90 mg total) by mouth once daily.  Qty: 30 tablet, Refills: 11    Comments: .      !! QUEtiapine (SEROQUEL) 25 MG Tab Take 1/2 tablet (1.25mg) every morning  Qty: 30 tablet, Refills: 11      !! QUEtiapine (SEROQUEL) 50 MG tablet Take 1 tablet (50 mg total) by mouth every evening.  Qty: 30 tablet, Refills: 11      senna-docusate 8.6-50 mg (PERICOLACE) 8.6-50 mg per tablet Take 1 tablet by mouth 2 (two) times daily as needed for Constipation.       !! - Potential duplicate medications found. Please discuss with provider.        CONTINUE these medications which have NOT CHANGED    Details   memantine (NAMENDA) 10 MG Tab Take 5 mg by mouth 2 (two) times daily.           STOP taking these medications       donepezil (ARICEPT) 5 MG tablet Comments:   Reason for Stopping:         sildenafil (VIAGRA) 100 MG tablet Comments:   Reason for Stopping:         tadalafil (CIALIS) 20 MG Tab Comments:   Reason for Stopping:         vardenafil (LEVITRA) 20 MG tablet Comments:   Reason for Stopping:                  Immunizations Administered as of 12/15/2023       Name Date Dose VIS Date Route Exp Date    COVID-19, MRNA, LN-S, PF (Pfizer) (Purple Cap) 10/14/2021 0.3 mL 58000 Intramuscular --    Site: Left arm     : [employer]     Lot: [id number]     Comment: Adminis     COVID-19, MRNA, LN-S, PF (Pfizer) (Purple Cap) 8/18/2021 0.3 mL 24396 Intramuscular --    Site: Left arm     : [employer]     Lot: [id number]     Comment: Adminis               Some patients may experience side  effects after vaccination.  These may include fever, headache, muscle or joint aches.  Most symptoms resolve with 24-48 hours and do not require urgent medical evaluation unless they persist for more than 72 hours or symptoms are concerning for an unrelated medical condition.          _________________________________  Nakia Schuster PA-C  12/15/2023
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
## 2023-12-15 NOTE — PLAN OF CARE
[facility] - Neurosurgery (Hospital)  Discharge Final Note    Primary Care Provider: Rupal Garcia MD    Expected Discharge Date: 12/15/2023    Final Discharge Note (most recent)       Final Note - 12/15/23 1401          Final Note    Assessment Type Final Discharge Note (P)      Anticipated Discharge Disposition Planned Readmission - Skilled Nursing Facility  (P)         Post-Acute Status    Post-Acute Authorization Placement (P)      Post-Acute Placement Status Set-up Complete/Auth obtained (P)      Discharge Delays Post-Acute Set-up (P)                      Important Message from Medicare  Important Message from Medicare regarding Discharge Appeal Rights: Given to patient/caregiver, Explained to patient/caregiver, Signed/date by patient/caregiver     Date IMM was signed: 12/14/23  Time IMM was signed: 1023    Contact Info       Rupal Garcia MD   Specialty: Family Medicine   Relationship: PCP - General    [address] [address]  [facility] [address]   Phone: [phone]       Next Steps: Follow up    Instructions: Please follow up with your primary care provider after SNF    [facility]   Specialty: Vascular Neurology    [address] [facility] [address]   Phone: [phone]       Next Steps: Follow up in 4 week(s)    Instructions: Someone will contact you to schedule your stroke follow up appointment        No future appointments.    Patient discharging to [facility].  RN given number for report and SW set up ambo transport for 445.  Family aware.      Discharge Plan A and Plan B have been determined by review of patient's clinical status, future medical and therapeutic needs, and coverage/benefits for post-acute care in coordination with multidisciplinary team members.    Nallely Qureshi, ALISA  [facility]  [phone]

## 2023-12-15 NOTE — ASSESSMENT & PLAN NOTE
-Due to stroke and noted on imaging.  4th ventricle compression specifically noted  -Likely etiology is hypertension  -Remains stable on follow up CTHs

## 2023-12-15 NOTE — CARE UPDATE
RAPID RESPONSE NURSE ROUND       Rounding completed with charge RN, Emily for previous blood pressure fluctuations due to HTN and  medication treatment. Reports  No additional concerns verbalized at this time. Instructed to call [phone] for further concerns or assistance.

## 2023-12-15 NOTE — ASSESSMENT & PLAN NOTE
-seroquel 12.5mg in AM and seroquel 50mg qHS  -melatonin qhs  -atarax low dose qhs prn  -delirium precautions

## 2024-03-18 NOTE — SUBJECTIVE & OBJECTIVE
Past Medical History:   Diagnosis Date    AR (allergic rhinitis)     Hypertension     hereditary    Memory loss     Prostate cancer      History reviewed. No pertinent surgical history.   No current facility-administered medications on file prior to encounter.     Current Outpatient Medications on File Prior to Encounter   Medication Sig Dispense Refill    donepezil (ARICEPT) 5 MG tablet Take 1 tablet (5 mg total) by mouth once daily. 30 tablet 11    memantine (NAMENDA) 10 MG Tab Take 5 mg by mouth 2 (two) times daily.      sildenafil (VIAGRA) 100 MG tablet Take 1 tablet (100 mg total) by mouth daily as needed for Erectile Dysfunction. 4 tablet 6    tadalafil (CIALIS) 20 MG Tab Take 1 tablet (20 mg total) by mouth daily as needed. Take 1 hour before intercourse 10 tablet 11    vardenafil (LEVITRA) 20 MG tablet Take 1 tablet (20 mg total) by mouth daily as needed for Erectile Dysfunction. Take 1 hour before intercourse. 10 tablet 11      Allergies: Strawberries [strawberry]    Social History     Tobacco Use    Smoking status: Every Day     Current packs/day: 0.25     Types: Cigarettes    Smokeless tobacco: Never   Substance Use Topics    Alcohol use: No    Drug use: No     Review of Systems    Unable to perform due to LOC     Objective:     Vitals:    Temp: 96.4 °F (35.8 °C)  Pulse: 90  BP: (!) 159/102  MAP (mmHg): 126  Resp: 12  SpO2: 100 %    Temp  Min: 96.4 °F (35.8 °C)  Max: 97.4 °F (36.3 °C)  Pulse  Min: 80  Max: 90  BP  Min: 159/102  Max: 244/163  MAP (mmHg)  Min: 119  Max: 194  Resp  Min: 12  Max: 16  SpO2  Min: 99 %  Max: 100 %    No intake/output data recorded.            Physical Exam      Physical Exam:  GA: Lethargic , no acute distress.   HEENT: No scleral icterus or JVD.   Pulmonary: Clear to auscultation A//L.   Cardiac: RRR S1 & S2 w/o rubs/murmurs/gallops.   Abdominal: Bowel sounds present x 4.   Skin: No jaundice, rashes, or visible lesions.  Neuro:  --GCS: E2 V3 M5  --Mental Status:  lethargic  does not follow or attend   --CN II-XII grossly unable to assess fully due to LOC   --Pupils 3mm, PERRL.   --Corneal reflex, gag, cough intact.  -- Localizes upper withdrawal lowers     Unable to test orientation, language, memory, judgment, insight, fund of knowledge, gait due to level of consciousness.       Today I personally reviewed pertinent medications, lines/drains/airways, imaging, laboratory results, notably: CTH     No

## 2024-04-01 ENCOUNTER — DOCUMENTATION ONLY (OUTPATIENT)
Dept: NEUROLOGY | Facility: HOSPITAL | Age: 84
End: 2024-04-01
Payer: MEDICARE

## 2025-01-14 NOTE — ASSESSMENT & PLAN NOTE
-Due to stroke and noted on imaging.  4th ventricle compression specifically noted  -Likely etiology is hypertension  -Repeat CTH stable   uto